# Patient Record
Sex: FEMALE | Race: WHITE | NOT HISPANIC OR LATINO | Employment: OTHER | URBAN - NONMETROPOLITAN AREA
[De-identification: names, ages, dates, MRNs, and addresses within clinical notes are randomized per-mention and may not be internally consistent; named-entity substitution may affect disease eponyms.]

---

## 2023-01-22 PROBLEM — M54.16 LUMBAR RADICULITIS: Status: ACTIVE | Noted: 2023-01-22

## 2023-01-22 PROBLEM — R73.9 BORDERLINE HYPERGLYCEMIA: Status: ACTIVE | Noted: 2023-01-22

## 2023-01-22 PROBLEM — E78.5 DYSLIPIDEMIA: Status: ACTIVE | Noted: 2023-01-22

## 2023-01-22 PROBLEM — M16.11 OSTEOARTHRITIS OF RIGHT HIP: Status: ACTIVE | Noted: 2023-01-22

## 2023-01-22 PROBLEM — M25.551 RIGHT HIP PAIN: Status: ACTIVE | Noted: 2023-01-22

## 2023-01-22 PROBLEM — E55.9 MILD VITAMIN D DEFICIENCY: Status: ACTIVE | Noted: 2023-01-22

## 2023-01-22 PROBLEM — S76.019A: Status: ACTIVE | Noted: 2023-01-22

## 2023-01-22 PROBLEM — G62.9 PERIPHERAL NEUROPATHY: Status: ACTIVE | Noted: 2023-01-22

## 2023-01-22 PROBLEM — R03.0 BLOOD PRESSURE ELEVATED WITHOUT HISTORY OF HTN: Status: ACTIVE | Noted: 2023-01-22

## 2023-01-22 PROBLEM — I73.9 PERIPHERAL ARTERIAL DISEASE (CMS-HCC): Status: ACTIVE | Noted: 2023-01-22

## 2023-01-22 PROBLEM — K57.30 DIVERTICULOSIS OF LARGE INTESTINE WITHOUT HEMORRHAGE: Status: ACTIVE | Noted: 2023-01-22

## 2023-01-22 PROBLEM — K63.5 HYPERPLASTIC COLON POLYP: Status: ACTIVE | Noted: 2023-01-22

## 2023-01-22 PROBLEM — M53.3 SACROILIAC JOINT DYSFUNCTION OF RIGHT SIDE: Status: ACTIVE | Noted: 2023-01-22

## 2023-01-22 PROBLEM — M79.2 INGUINAL NEURALGIA: Status: ACTIVE | Noted: 2023-01-22

## 2023-01-22 RX ORDER — OXYCODONE AND ACETAMINOPHEN 5; 325 MG/1; MG/1
1 TABLET ORAL DAILY PRN
COMMUNITY
Start: 2022-09-13 | End: 2023-03-07 | Stop reason: ALTCHOICE

## 2023-01-22 RX ORDER — KETOROLAC TROMETHAMINE 30 MG/ML
30 INJECTION, SOLUTION INTRAMUSCULAR; INTRAVENOUS
COMMUNITY
Start: 2022-03-25 | End: 2023-03-05 | Stop reason: ALTCHOICE

## 2023-01-22 RX ORDER — NAPROXEN 500 MG/1
500 TABLET ORAL EVERY 12 HOURS PRN
COMMUNITY
Start: 2022-05-04 | End: 2023-03-07 | Stop reason: ALTCHOICE

## 2023-01-22 RX ORDER — GABAPENTIN 300 MG/1
1 CAPSULE ORAL 3 TIMES DAILY
COMMUNITY
Start: 2022-03-25 | End: 2023-03-07 | Stop reason: ALTCHOICE

## 2023-01-22 RX ORDER — CYCLOBENZAPRINE HCL 5 MG
5 TABLET ORAL NIGHTLY
COMMUNITY
Start: 2022-08-30 | End: 2023-03-07 | Stop reason: ALTCHOICE

## 2023-01-22 RX ORDER — TIZANIDINE 2 MG/1
2 TABLET ORAL
COMMUNITY
End: 2023-03-05 | Stop reason: ALTCHOICE

## 2023-03-07 ENCOUNTER — OFFICE VISIT (OUTPATIENT)
Dept: PRIMARY CARE | Facility: CLINIC | Age: 68
End: 2023-03-07
Payer: MEDICARE

## 2023-03-07 VITALS
DIASTOLIC BLOOD PRESSURE: 64 MMHG | HEART RATE: 98 BPM | OXYGEN SATURATION: 96 % | WEIGHT: 144 LBS | BODY MASS INDEX: 27.19 KG/M2 | TEMPERATURE: 97.3 F | HEIGHT: 61 IN | SYSTOLIC BLOOD PRESSURE: 122 MMHG

## 2023-03-07 DIAGNOSIS — E78.5 DYSLIPIDEMIA: ICD-10-CM

## 2023-03-07 DIAGNOSIS — Z78.0 ASYMPTOMATIC POSTMENOPAUSAL STATE: ICD-10-CM

## 2023-03-07 DIAGNOSIS — Z12.31 ENCOUNTER FOR SCREENING MAMMOGRAM FOR BREAST CANCER: ICD-10-CM

## 2023-03-07 DIAGNOSIS — Z00.00 MEDICARE ANNUAL WELLNESS VISIT, SUBSEQUENT: Primary | ICD-10-CM

## 2023-03-07 PROCEDURE — G0439 PPPS, SUBSEQ VISIT: HCPCS | Performed by: FAMILY MEDICINE

## 2023-03-07 PROCEDURE — 1036F TOBACCO NON-USER: CPT | Performed by: FAMILY MEDICINE

## 2023-03-07 PROCEDURE — 1170F FXNL STATUS ASSESSED: CPT | Performed by: FAMILY MEDICINE

## 2023-03-07 PROCEDURE — 1159F MED LIST DOCD IN RCRD: CPT | Performed by: FAMILY MEDICINE

## 2023-03-07 ASSESSMENT — ACTIVITIES OF DAILY LIVING (ADL)
PILL BOX USED: NO
JUDGMENT_ADEQUATE_SAFELY_COMPLETE_DAILY_ACTIVITIES: NO
JUDGMENT_ADEQUATE_SAFELY_COMPLETE_DAILY_ACTIVITIES: YES
PREPARING MEALS: INDEPENDENT
USING TELEPHONE: INDEPENDENT
GROCERY SHOPPING: INDEPENDENT
TAKING MEDICATION: INDEPENDENT
PATIENT'S MEMORY ADEQUATE TO SAFELY COMPLETE DAILY ACTIVITIES?: YES
TAKING_MEDICATION: INDEPENDENT
GROCERY_SHOPPING: INDEPENDENT
FEEDING YOURSELF: INDEPENDENT
FEEDING: INDEPENDENT
BATHING: INDEPENDENT
EATING: INDEPENDENT
BATHING: INDEPENDENT
HEARING - LEFT EAR: FUNCTIONAL
BATHING: INDEPENDENT
ADEQUATE_TO_COMPLETE_ADL: NO
TOILETING: INDEPENDENT
WALKS IN HOME: INDEPENDENT
DRESSING: INDEPENDENT
MANAGING_FINANCES: INDEPENDENT
NEEDS ASSISTANCE WITH FOOD: INDEPENDENT
MANAGING FINANCES: INDEPENDENT
TOILETING: INDEPENDENT
GROOMING: INDEPENDENT
DOING_HOUSEWORK: INDEPENDENT
DRESSING YOURSELF: INDEPENDENT
HEARING - RIGHT EAR: FUNCTIONAL
USING TRANSPORTATION: INDEPENDENT
STIL DRIVING: YES
DOING HOUSEWORK: INDEPENDENT
DRESSING: INDEPENDENT
ADEQUATE_TO_COMPLETE_ADL: YES

## 2023-03-07 ASSESSMENT — PATIENT HEALTH QUESTIONNAIRE - PHQ9
2. FEELING DOWN, DEPRESSED OR HOPELESS: NOT AT ALL
2. FEELING DOWN, DEPRESSED OR HOPELESS: NOT AT ALL
1. LITTLE INTEREST OR PLEASURE IN DOING THINGS: NOT AT ALL
SUM OF ALL RESPONSES TO PHQ9 QUESTIONS 1 AND 2: 0
SUM OF ALL RESPONSES TO PHQ9 QUESTIONS 1 AND 2: 0
1. LITTLE INTEREST OR PLEASURE IN DOING THINGS: NOT AT ALL

## 2023-03-07 NOTE — PROGRESS NOTES
"Subjective   Reason for Visit: Janelle Pitt is an 67 y.o. female here for a Medicare Wellness visit.          Reviewed all medications by prescribing practitioner or clinical pharmacist (such as prescriptions, OTCs, herbal therapies and supplements) and documented in the medical record.    HPI    Here for medicare annual visit  Doing much better since she had her right hip replacement, no longer having pain in her hip or her back, no numbness/tingling.     Patient Self Assessment of Health Status  Patient Self Assessment: Excellent    Nutrition and Exercise  Current Diet: Well Balanced Diet  Adequate Fluid Intake: Yes  Caffeine: Yes  Exercise Frequency: Infrequently    Functional Ability/Level of Safety  Cognitive Impairment Observed: No cognitive impairment observed  Cognitive Impairment Reported: No cognitive impairment reported by patient or family    Home Safety Risk Factors: None    Patient Care Team:  Kar Medina MD as PCP - General Lainey Moss MD as PCP - [employer] PCP     Review of Systems  General: no fever  Eyes: no blurry vision  ENT: no sore throat, no ear pain  Resp: no cough, sob or wheezing  Cardio: no chest pain, no palpitations  Abd: no nausea/vomiting  : no dysuria, no increased urinary frequency      Objective   Vitals:  /64   Pulse 98   Temp 36.3 °C (97.3 °F)   Ht 1.549 m (5' 1\")   Wt 65.3 kg (144 lb)   SpO2 96%   BMI 27.21 kg/m²       Physical Exam  Gen: NAD, alert  Head: normocephalic/atraumatic  Eyes: conjunctivae normal  Ears: canals clear bilaterally, TM normal   Nose: Deferred due to covid precautions, wearing mask  Oropharynx: Deferred due to covid precautions, wearing mask  Resp: Clear to auscultation  CVS: Regular rate and rhythm  Abdomen: soft, NT, ND  Ext: no edema, NT of lower extremities  Neuro: gait normal       Assessment/Plan   Problem List Items Addressed This Visit       Dyslipidemia    Relevant Orders    CBC    Comprehensive " Metabolic Panel    Lipid Panel     Other Visit Diagnoses       Medicare annual wellness visit, subsequent    -  Primary    Encounter for screening mammogram for breast cancer        Relevant Orders    BI mammo bilateral screening tomosynthesis    Asymptomatic postmenopausal state        Relevant Orders    XR DEXA bone density

## 2023-03-12 PROBLEM — K57.30 DIVERTICULOSIS OF LARGE INTESTINE WITHOUT HEMORRHAGE: Status: RESOLVED | Noted: 2023-01-22 | Resolved: 2023-03-12

## 2023-03-12 PROBLEM — I73.9 PERIPHERAL ARTERIAL DISEASE (CMS-HCC): Status: RESOLVED | Noted: 2023-01-22 | Resolved: 2023-03-12

## 2023-03-12 PROBLEM — M79.2 INGUINAL NEURALGIA: Status: RESOLVED | Noted: 2023-01-22 | Resolved: 2023-03-12

## 2023-03-12 PROBLEM — M53.3 SACROILIAC JOINT DYSFUNCTION OF RIGHT SIDE: Status: RESOLVED | Noted: 2023-01-22 | Resolved: 2023-03-12

## 2023-03-12 PROBLEM — M54.16 LUMBAR RADICULITIS: Status: RESOLVED | Noted: 2023-01-22 | Resolved: 2023-03-12

## 2023-03-12 PROBLEM — K63.5 HYPERPLASTIC COLON POLYP: Status: RESOLVED | Noted: 2023-01-22 | Resolved: 2023-03-12

## 2023-03-12 PROBLEM — S76.019A: Status: RESOLVED | Noted: 2023-01-22 | Resolved: 2023-03-12

## 2023-03-12 PROBLEM — M25.551 RIGHT HIP PAIN: Status: RESOLVED | Noted: 2023-01-22 | Resolved: 2023-03-12

## 2023-03-12 PROBLEM — R03.0 BLOOD PRESSURE ELEVATED WITHOUT HISTORY OF HTN: Status: RESOLVED | Noted: 2023-01-22 | Resolved: 2023-03-12

## 2023-03-12 PROBLEM — M16.11 OSTEOARTHRITIS OF RIGHT HIP: Status: RESOLVED | Noted: 2023-01-22 | Resolved: 2023-03-12

## 2023-03-12 PROBLEM — G62.9 PERIPHERAL NEUROPATHY: Status: RESOLVED | Noted: 2023-01-22 | Resolved: 2023-03-12

## 2023-07-10 ENCOUNTER — LAB (OUTPATIENT)
Dept: LAB | Facility: LAB | Age: 68
End: 2023-07-10
Payer: MEDICARE

## 2023-07-10 ENCOUNTER — TELEPHONE (OUTPATIENT)
Dept: PRIMARY CARE | Facility: CLINIC | Age: 68
End: 2023-07-10

## 2023-07-10 DIAGNOSIS — E78.5 DYSLIPIDEMIA: ICD-10-CM

## 2023-07-10 DIAGNOSIS — E78.5 DYSLIPIDEMIA: Primary | ICD-10-CM

## 2023-07-10 LAB
ALANINE AMINOTRANSFERASE (SGPT) (U/L) IN SER/PLAS: 13 U/L (ref 7–45)
ALBUMIN (G/DL) IN SER/PLAS: 4.1 G/DL (ref 3.4–5)
ALKALINE PHOSPHATASE (U/L) IN SER/PLAS: 84 U/L (ref 33–136)
ANION GAP IN SER/PLAS: 13 MMOL/L (ref 10–20)
ASPARTATE AMINOTRANSFERASE (SGOT) (U/L) IN SER/PLAS: 23 U/L (ref 9–39)
BILIRUBIN TOTAL (MG/DL) IN SER/PLAS: 0.7 MG/DL (ref 0–1.2)
CALCIUM (MG/DL) IN SER/PLAS: 9.3 MG/DL (ref 8.6–10.3)
CARBON DIOXIDE, TOTAL (MMOL/L) IN SER/PLAS: 30 MMOL/L (ref 21–32)
CHLORIDE (MMOL/L) IN SER/PLAS: 99 MMOL/L (ref 98–107)
CHOLESTEROL (MG/DL) IN SER/PLAS: 215 MG/DL (ref 0–199)
CHOLESTEROL IN HDL (MG/DL) IN SER/PLAS: 92.1 MG/DL
CHOLESTEROL/HDL RATIO: 2.3
CREATININE (MG/DL) IN SER/PLAS: 0.57 MG/DL (ref 0.5–1.05)
ERYTHROCYTE DISTRIBUTION WIDTH (RATIO) BY AUTOMATED COUNT: 13.2 % (ref 11.5–14.5)
ERYTHROCYTE MEAN CORPUSCULAR HEMOGLOBIN CONCENTRATION (G/DL) BY AUTOMATED: 33.4 G/DL (ref 32–36)
ERYTHROCYTE MEAN CORPUSCULAR VOLUME (FL) BY AUTOMATED COUNT: 96 FL (ref 80–100)
ERYTHROCYTES (10*6/UL) IN BLOOD BY AUTOMATED COUNT: 4.48 X10E12/L (ref 4–5.2)
GFR FEMALE: >90 ML/MIN/1.73M2
GLUCOSE (MG/DL) IN SER/PLAS: 95 MG/DL (ref 74–99)
HEMATOCRIT (%) IN BLOOD BY AUTOMATED COUNT: 42.8 % (ref 36–46)
HEMOGLOBIN (G/DL) IN BLOOD: 14.3 G/DL (ref 12–16)
LDL: 102 MG/DL (ref 0–99)
LEUKOCYTES (10*3/UL) IN BLOOD BY AUTOMATED COUNT: 7.1 X10E9/L (ref 4.4–11.3)
PLATELETS (10*3/UL) IN BLOOD AUTOMATED COUNT: 314 X10E9/L (ref 150–450)
POTASSIUM (MMOL/L) IN SER/PLAS: 3.5 MMOL/L (ref 3.5–5.3)
PROTEIN TOTAL: 7 G/DL (ref 6.4–8.2)
SODIUM (MMOL/L) IN SER/PLAS: 138 MMOL/L (ref 136–145)
TRIGLYCERIDE (MG/DL) IN SER/PLAS: 107 MG/DL (ref 0–149)
UREA NITROGEN (MG/DL) IN SER/PLAS: 8 MG/DL (ref 6–23)
VLDL: 21 MG/DL (ref 0–40)

## 2023-07-10 PROCEDURE — 80053 COMPREHEN METABOLIC PANEL: CPT

## 2023-07-10 PROCEDURE — 80061 LIPID PANEL: CPT

## 2023-07-10 PROCEDURE — 85027 COMPLETE CBC AUTOMATED: CPT

## 2023-07-10 PROCEDURE — 36415 COLL VENOUS BLD VENIPUNCTURE: CPT

## 2023-07-10 NOTE — TELEPHONE ENCOUNTER
Are you able to reinput bloodwork in system, CBC, CMP, Lipid?  Lynne at the lab is unable to pull current orders since they were entered prior to [facility].  Bloodwork has been drawn already.

## 2023-09-05 DIAGNOSIS — M79.2 NEURALGIA AND NEURITIS, UNSPECIFIED: ICD-10-CM

## 2023-09-05 RX ORDER — CYCLOBENZAPRINE HCL 5 MG
TABLET ORAL
Qty: 90 TABLET | Refills: 1 | Status: SHIPPED | OUTPATIENT
Start: 2023-09-05 | End: 2024-03-05 | Stop reason: SDUPTHER

## 2024-03-05 ENCOUNTER — OFFICE VISIT (OUTPATIENT)
Dept: PRIMARY CARE | Facility: CLINIC | Age: 69
End: 2024-03-05
Payer: MEDICARE

## 2024-03-05 VITALS
TEMPERATURE: 97 F | DIASTOLIC BLOOD PRESSURE: 80 MMHG | SYSTOLIC BLOOD PRESSURE: 149 MMHG | WEIGHT: 151.6 LBS | BODY MASS INDEX: 28.62 KG/M2 | HEIGHT: 61 IN

## 2024-03-05 DIAGNOSIS — M25.552 LEFT HIP PAIN: ICD-10-CM

## 2024-03-05 DIAGNOSIS — R03.0 ELEVATED BLOOD PRESSURE READING: ICD-10-CM

## 2024-03-05 DIAGNOSIS — Z00.00 MEDICARE ANNUAL WELLNESS VISIT, SUBSEQUENT: Primary | ICD-10-CM

## 2024-03-05 DIAGNOSIS — M54.32 SCIATICA OF LEFT SIDE: ICD-10-CM

## 2024-03-05 PROCEDURE — 1123F ACP DISCUSS/DSCN MKR DOCD: CPT | Performed by: FAMILY MEDICINE

## 2024-03-05 PROCEDURE — 1157F ADVNC CARE PLAN IN RCRD: CPT | Performed by: FAMILY MEDICINE

## 2024-03-05 PROCEDURE — 1036F TOBACCO NON-USER: CPT | Performed by: FAMILY MEDICINE

## 2024-03-05 PROCEDURE — 1158F ADVNC CARE PLAN TLK DOCD: CPT | Performed by: FAMILY MEDICINE

## 2024-03-05 PROCEDURE — 1159F MED LIST DOCD IN RCRD: CPT | Performed by: FAMILY MEDICINE

## 2024-03-05 PROCEDURE — 1170F FXNL STATUS ASSESSED: CPT | Performed by: FAMILY MEDICINE

## 2024-03-05 PROCEDURE — 3008F BODY MASS INDEX DOCD: CPT | Performed by: FAMILY MEDICINE

## 2024-03-05 PROCEDURE — 96372 THER/PROPH/DIAG INJ SC/IM: CPT | Performed by: FAMILY MEDICINE

## 2024-03-05 PROCEDURE — G0439 PPPS, SUBSEQ VISIT: HCPCS | Performed by: FAMILY MEDICINE

## 2024-03-05 PROCEDURE — 1160F RVW MEDS BY RX/DR IN RCRD: CPT | Performed by: FAMILY MEDICINE

## 2024-03-05 RX ORDER — CYCLOBENZAPRINE HCL 5 MG
TABLET ORAL
Qty: 90 TABLET | Refills: 1 | Status: SHIPPED | OUTPATIENT
Start: 2024-03-05

## 2024-03-05 RX ORDER — KETOROLAC TROMETHAMINE 30 MG/ML
30 INJECTION, SOLUTION INTRAMUSCULAR; INTRAVENOUS ONCE
Status: COMPLETED | OUTPATIENT
Start: 2024-03-05 | End: 2024-03-05

## 2024-03-05 RX ORDER — PREDNISONE 20 MG/1
TABLET ORAL
Qty: 18 TABLET | Refills: 0 | Status: SHIPPED | OUTPATIENT
Start: 2024-03-05 | End: 2024-06-11 | Stop reason: ALTCHOICE

## 2024-03-05 RX ADMIN — KETOROLAC TROMETHAMINE 30 MG: 30 INJECTION, SOLUTION INTRAMUSCULAR; INTRAVENOUS at 13:51

## 2024-03-05 ASSESSMENT — ENCOUNTER SYMPTOMS
LOSS OF SENSATION IN FEET: 0
OCCASIONAL FEELINGS OF UNSTEADINESS: 0
DEPRESSION: 0

## 2024-03-05 ASSESSMENT — PATIENT HEALTH QUESTIONNAIRE - PHQ9
SUM OF ALL RESPONSES TO PHQ9 QUESTIONS 1 AND 2: 0
2. FEELING DOWN, DEPRESSED OR HOPELESS: NOT AT ALL
1. LITTLE INTEREST OR PLEASURE IN DOING THINGS: NOT AT ALL

## 2024-03-05 ASSESSMENT — ACTIVITIES OF DAILY LIVING (ADL)
TAKING_MEDICATION: INDEPENDENT
BATHING: INDEPENDENT
DRESSING: INDEPENDENT
GROCERY_SHOPPING: INDEPENDENT
DOING_HOUSEWORK: INDEPENDENT
MANAGING_FINANCES: INDEPENDENT
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
## 2024-03-07 ENCOUNTER — APPOINTMENT (OUTPATIENT)
Dept: PRIMARY CARE | Facility: CLINIC | Age: 69
End: 2024-03-07
Payer: MEDICARE

## 2024-03-20 ENCOUNTER — HOSPITAL ENCOUNTER (OUTPATIENT)
Dept: RADIOLOGY | Facility: HOSPITAL | Age: 69
Discharge: HOME | End: 2024-03-20
Payer: MEDICARE

## 2024-03-20 ENCOUNTER — OFFICE VISIT (OUTPATIENT)
Dept: PRIMARY CARE | Facility: CLINIC | Age: 69
End: 2024-03-20
Payer: MEDICARE

## 2024-03-20 ENCOUNTER — APPOINTMENT (OUTPATIENT)
Dept: PRIMARY CARE | Facility: CLINIC | Age: 69
End: 2024-03-20
Payer: MEDICARE

## 2024-03-20 VITALS
DIASTOLIC BLOOD PRESSURE: 84 MMHG | BODY MASS INDEX: 28.74 KG/M2 | WEIGHT: 152.2 LBS | SYSTOLIC BLOOD PRESSURE: 134 MMHG | HEIGHT: 61 IN | OXYGEN SATURATION: 98 % | HEART RATE: 103 BPM

## 2024-03-20 DIAGNOSIS — M54.32 SCIATICA OF LEFT SIDE: ICD-10-CM

## 2024-03-20 DIAGNOSIS — M25.552 LEFT HIP PAIN: ICD-10-CM

## 2024-03-20 PROCEDURE — 1160F RVW MEDS BY RX/DR IN RCRD: CPT | Performed by: FAMILY MEDICINE

## 2024-03-20 PROCEDURE — 73502 X-RAY EXAM HIP UNI 2-3 VIEWS: CPT | Mod: LEFT SIDE | Performed by: RADIOLOGY

## 2024-03-20 PROCEDURE — 1036F TOBACCO NON-USER: CPT | Performed by: FAMILY MEDICINE

## 2024-03-20 PROCEDURE — 1159F MED LIST DOCD IN RCRD: CPT | Performed by: FAMILY MEDICINE

## 2024-03-20 PROCEDURE — 1157F ADVNC CARE PLAN IN RCRD: CPT | Performed by: FAMILY MEDICINE

## 2024-03-20 PROCEDURE — 73502 X-RAY EXAM HIP UNI 2-3 VIEWS: CPT | Mod: LT

## 2024-03-20 PROCEDURE — 3008F BODY MASS INDEX DOCD: CPT | Performed by: FAMILY MEDICINE

## 2024-03-20 PROCEDURE — 99213 OFFICE O/P EST LOW 20 MIN: CPT | Performed by: FAMILY MEDICINE

## 2024-03-20 RX ORDER — TRAMADOL HYDROCHLORIDE 50 MG/1
50 TABLET ORAL EVERY 12 HOURS PRN
Qty: 14 TABLET | Refills: 0 | Status: SHIPPED | OUTPATIENT
Start: 2024-03-20 | End: 2024-03-27

## 2024-03-20 RX ORDER — GABAPENTIN 100 MG/1
100 CAPSULE ORAL 3 TIMES DAILY
Qty: 90 CAPSULE | Refills: 5 | Status: SHIPPED | OUTPATIENT
Start: 2024-03-20 | End: 2024-09-16

## 2024-03-20 RX ORDER — CYCLOBENZAPRINE HCL 5 MG
TABLET ORAL
Qty: 90 TABLET | Refills: 1 | Status: CANCELLED | OUTPATIENT
Start: 2024-03-20

## 2024-03-20 ASSESSMENT — PATIENT HEALTH QUESTIONNAIRE - PHQ9
2. FEELING DOWN, DEPRESSED OR HOPELESS: NOT AT ALL
1. LITTLE INTEREST OR PLEASURE IN DOING THINGS: NOT AT ALL
SUM OF ALL RESPONSES TO PHQ9 QUESTIONS 1 AND 2: 0

## 2024-03-20 ASSESSMENT — ENCOUNTER SYMPTOMS
DEPRESSION: 0
LOSS OF SENSATION IN FEET: 0
OCCASIONAL FEELINGS OF UNSTEADINESS: 0

## 2024-03-20 NOTE — PROGRESS NOTES
"Subjective   Patient ID: Janelle Pitt is a 68 y.o. female who presents for Follow-up (Left leg, losing balance fell twice last week).    HPI  Still having pain in her left hip radiating down her leg, now having numbness in her lower leg and feels balance. Finished prednisone did not help.     Review of Systems  General: no fever  Eyes: no blurry vision  ENT: no sore throat, no ear pain  Resp: no cough, sob or wheezing  Cardio: no chest pain, no palpitations  Abd: no nausea/vomiting  : no dysuria, no increased urinary frequency      /84   Pulse 103   Ht 1.549 m (5' 1\")   Wt 69 kg (152 lb 3.2 oz)   SpO2 98%   BMI 28.76 kg/m²       Objective   Physical Exam  Gen: NAD, alert  Head: normocephalic/atraumatic  Eyes: conjunctivae normal  Ears: canals clear bilaterally, TM normal   Nose: external nose normal   Oropharynx: clear   Resp: Clear to auscultation  CVS: Regular rate and rhythm  Abdomen: soft, NT, ND  Ext: no edema, NT of lower extremities  Neuro: using cane to ambulate, limping gait    Assessment/Plan   Problem List Items Addressed This Visit    None  Visit Diagnoses       Left hip pain        Relevant Medications    traMADol (Ultram) 50 mg tablet    Other Relevant Orders    XR hip left with pelvis when performed 2 or 3 views    Referral to Physical Therapy    Sciatica of left side        Relevant Medications    gabapentin (Neurontin) 100 mg capsule    Other Relevant Orders    Referral to Physical Therapy               "

## 2024-04-15 ENCOUNTER — EVALUATION (OUTPATIENT)
Dept: PHYSICAL THERAPY | Facility: HOSPITAL | Age: 69
End: 2024-04-15
Payer: MEDICARE

## 2024-04-15 DIAGNOSIS — M25.552 LEFT HIP PAIN: ICD-10-CM

## 2024-04-15 DIAGNOSIS — M54.32 SCIATICA OF LEFT SIDE: Primary | ICD-10-CM

## 2024-04-15 PROCEDURE — 97162 PT EVAL MOD COMPLEX 30 MIN: CPT | Mod: GP | Performed by: PHYSICAL THERAPIST

## 2024-04-15 ASSESSMENT — PAIN - FUNCTIONAL ASSESSMENT: PAIN_FUNCTIONAL_ASSESSMENT: 0-10

## 2024-04-15 ASSESSMENT — ENCOUNTER SYMPTOMS
LOSS OF SENSATION IN FEET: 1
DEPRESSION: 0
OCCASIONAL FEELINGS OF UNSTEADINESS: 1

## 2024-04-15 ASSESSMENT — PAIN SCALES - GENERAL: PAINLEVEL_OUTOF10: 7
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
## 2024-04-18 ENCOUNTER — OFFICE VISIT (OUTPATIENT)
Dept: PRIMARY CARE | Facility: CLINIC | Age: 69
End: 2024-04-18
Payer: MEDICARE

## 2024-04-18 ENCOUNTER — TREATMENT (OUTPATIENT)
Dept: PHYSICAL THERAPY | Facility: HOSPITAL | Age: 69
End: 2024-04-18
Payer: MEDICARE

## 2024-04-18 VITALS
WEIGHT: 149.2 LBS | TEMPERATURE: 97 F | HEART RATE: 98 BPM | SYSTOLIC BLOOD PRESSURE: 127 MMHG | DIASTOLIC BLOOD PRESSURE: 83 MMHG | HEIGHT: 61 IN | OXYGEN SATURATION: 99 % | BODY MASS INDEX: 28.17 KG/M2

## 2024-04-18 DIAGNOSIS — M21.372 LEFT FOOT DROP: ICD-10-CM

## 2024-04-18 DIAGNOSIS — M25.552 LEFT HIP PAIN: ICD-10-CM

## 2024-04-18 DIAGNOSIS — M54.42 LEFT-SIDED LOW BACK PAIN WITH LEFT-SIDED SCIATICA, UNSPECIFIED CHRONICITY: Primary | ICD-10-CM

## 2024-04-18 DIAGNOSIS — M54.32 SCIATICA OF LEFT SIDE: ICD-10-CM

## 2024-04-18 PROCEDURE — 1159F MED LIST DOCD IN RCRD: CPT | Performed by: FAMILY MEDICINE

## 2024-04-18 PROCEDURE — 1157F ADVNC CARE PLAN IN RCRD: CPT | Performed by: FAMILY MEDICINE

## 2024-04-18 PROCEDURE — 1160F RVW MEDS BY RX/DR IN RCRD: CPT | Performed by: FAMILY MEDICINE

## 2024-04-18 PROCEDURE — 1036F TOBACCO NON-USER: CPT | Performed by: FAMILY MEDICINE

## 2024-04-18 PROCEDURE — 99213 OFFICE O/P EST LOW 20 MIN: CPT | Performed by: FAMILY MEDICINE

## 2024-04-18 PROCEDURE — 3008F BODY MASS INDEX DOCD: CPT | Performed by: FAMILY MEDICINE

## 2024-04-18 PROCEDURE — 97110 THERAPEUTIC EXERCISES: CPT | Mod: GP,CQ

## 2024-04-18 RX ORDER — OXYCODONE AND ACETAMINOPHEN 5; 325 MG/1; MG/1
1 TABLET ORAL EVERY 12 HOURS PRN
Qty: 14 TABLET | Refills: 0 | Status: SHIPPED | OUTPATIENT
Start: 2024-04-18 | End: 2024-04-25

## 2024-04-18 ASSESSMENT — PAIN - FUNCTIONAL ASSESSMENT: PAIN_FUNCTIONAL_ASSESSMENT: 0-10

## 2024-04-18 ASSESSMENT — PAIN SCALES - GENERAL: PAINLEVEL_OUTOF10: 7

## 2024-04-18 NOTE — PROGRESS NOTES
Physical Therapy Treatment    Patient Name: Janelle Pitt  MRN: [id number]  Today's Date: 4/18/2024  Time Calculation  Start Time: 1300  Stop Time: 1340  Time Calculation (min): 40 min     Current Problem  1. Left hip pain  Follow Up In Physical Therapy      2. Sciatica of left side  Follow Up In Physical Therapy          General  Reason for Referral: L LE radicular symptoms  Referred By: Dr. Gurrola  Past Medical History Relevant to Rehab: h/o R JIE 2022  Preferred Learning Style: verbal, visual, written    Subjective   Current Condition:   Same  Patient reports she was compliant with HEP, and had experienced some minimal relief; However, her pain level is the same.    Performing HEP?: Yes    Precautions  Precautions  STEADI Fall Risk Score (The score of 4 or more indicates an increased risk of falling): 8  Medical Precautions: Fall precautions    Pain  Pain Assessment: 0-10  Pain Score: 7  Pain Type: Neuropathic pain  Pain Location: Back  Pain Orientation: Left  Pain Radiating Towards: L foot  Pain Descriptors: Numbness, Radiating, Tightness, Aching  Pain Onset: Awakened from sleep    Objective   Lumbar Spine  Observation   Pt. Continues shifted to the L, and reports pain and tightness in L post LE and top of foot.  Lumbar Palpation/Joint Mobility Assessment   Pain with palpation L post HS, gastroc radiating from LB  Hip AROM   L shift    Treatments:    Therapeutic Exercise  Therapeutic Exercise Performed: Yes  Therapeutic Exercise Activity 1: Lateral shift correction at wall x 10  Therapeutic Exercise Activity 2: Prone lay hips L x 10  Therapeutic Exercise Activity 3: Prone on elbows hips L x 10  Therapeutic Exercise Activity 4: Side L on R x 5 min.  Therapeutic Exercise Activity 5: gastroc stretch on wedge 30 sec. x 2 ea.  Therapeutic Exercise Activity 6: HS stretch on steps  30 sec. x 2 on ea.    EDUCATION:   Outpatient Education  Individual(s) Educated: Patient  Education Provided: Home Exercise  Program  Patient/Caregiver Demonstrated Understanding: yes  Patient Response to Education: Patient/Caregiver Verbalized Understanding of Information    Assessment:Pt. Able to tolerate all exercises and added side lying on R to correct weight shift, and manual shift correct. Pt. Also tolerated stretching of tight Les. We will continue to work on shift correction and strengthening, as well as stretching and gait activities.  PT Assessment  PT Assessment Results: Decreased strength, Decreased range of motion, Decreased endurance, Impaired balance, Decreased mobility  Rehab Prognosis: Good    Plan:  OP PT Plan  Treatment/Interventions: Cryotherapy, Education/ Instruction, Gait training, Manual therapy, Neuromuscular re-education, Therapeutic activities, Therapeutic exercises  PT Plan: Skilled PT  PT Frequency: 2 times per week  Duration: 5 weeks  Onset Date: 02/01/24  Certification Period Start Date: 04/15/24  Certification Period End Date: 05/27/24  Number of Treatments Authorized: 2  Rehab Potential: Good  Plan of Care Agreement: Patient    Goals:  Active       PT Problem       Patient will be independent with HEP.        Start:  04/15/24    Expected End:  04/29/24            Patient will demonstrate upright posture without a L lateral shift in order to improve her pain.       Start:  04/15/24    Expected End:  05/13/24            Patient will demonstrate >/=4/5 strength with L ankle DF to improve her drop foot.       Start:  04/15/24    Expected End:  05/20/24            Patient will score >/= 45/80 on LEFS to improve her ability to perform ADL's and FA's.       Start:  04/15/24    Expected End:  05/20/24            Patient will report no >/= 2/10 pain with ambulating up to 15 minutes in the community with LRAD.       Start:  04/15/24    Expected End:  05/20/24                 Argelia Borrego, PTA

## 2024-04-18 NOTE — PROGRESS NOTES
"Subjective   Patient ID: Janelle Pitt is a 68 y.o. female who presents for Follow-up (Needs better pain medication).  HPI  Here for follow up   Still having left hip pain, now also having lower back pain with numbness from her left ankle to foot with foot drop. Following with PT.   Xray hip showed OA.   On flexeril and gabapentin for pain control, gabapentin making her drowsy. Tramadol did not help    Review of Systems  General: no fever  Eyes: no blurry vision  ENT: no sore throat, no ear pain  Resp: no cough, sob or wheezing  Cardio: no chest pain, no palpitations  Abd: no nausea/vomiting  : no dysuria, no increased urinary frequency      /83   Pulse 98   Temp 36.1 °C (97 °F)   Ht 1.549 m (5' 1\")   Wt 67.7 kg (149 lb 3.2 oz)   SpO2 99%   BMI 28.19 kg/m²       Objective   Physical Exam  Gen: NAD, alert  Head: normocephalic/atraumatic  Eyes: conjunctivae normal  Ears: canals clear bilaterally, TM normal   Nose: external nose normal   Oropharynx: clear   Resp: Clear to auscultation  CVS: Regular rate and rhythm  Abdomen: soft, NT, ND  Ext: no edema, NT of lower extremities  Neuro: limping gait, foot drop present     Assessment/Plan   Problem List Items Addressed This Visit    None  Visit Diagnoses       Left-sided low back pain with left-sided sciatica, unspecified chronicity    -  Primary    Relevant Medications    oxyCODONE-acetaminophen (Percocet) 5-325 mg tablet    Other Relevant Orders    XR lumbar spine 2-3 views    Left foot drop                   "

## 2024-04-23 ENCOUNTER — TREATMENT (OUTPATIENT)
Dept: PHYSICAL THERAPY | Facility: HOSPITAL | Age: 69
End: 2024-04-23
Payer: MEDICARE

## 2024-04-23 DIAGNOSIS — M25.552 LEFT HIP PAIN: ICD-10-CM

## 2024-04-23 DIAGNOSIS — M54.32 SCIATICA OF LEFT SIDE: ICD-10-CM

## 2024-04-23 PROCEDURE — 97110 THERAPEUTIC EXERCISES: CPT | Mod: GP | Performed by: PHYSICAL THERAPIST

## 2024-04-23 PROCEDURE — 97140 MANUAL THERAPY 1/> REGIONS: CPT | Mod: GP | Performed by: PHYSICAL THERAPIST

## 2024-04-23 ASSESSMENT — PAIN - FUNCTIONAL ASSESSMENT: PAIN_FUNCTIONAL_ASSESSMENT: 0-10

## 2024-04-23 ASSESSMENT — PAIN SCALES - GENERAL: PAINLEVEL_OUTOF10: 7

## 2024-04-23 NOTE — PROGRESS NOTES
"  Physical Therapy Treatment    Patient Name: Janelle Pitt  MRN: [id number]  Today's Date: 4/23/2024  Time Calculation  Start Time: 1347  Stop Time: 1428  Time Calculation (min): 41 min     PT Therapeutic Procedures Time Entry  Manual Therapy Time Entry: 12  Therapeutic Exercise Time Entry: 29      Current Problem  Problem List Items Addressed This Visit             ICD-10-CM    Left hip pain M25.552    Sciatica of left side M54.32     1. Left hip pain  Follow Up In Physical Therapy      2. Sciatica of left side  Follow Up In Physical Therapy          General  Reason for Referral: L LE radicular symptoms  Referred By: Dr. Gurrola  Past Medical History Relevant to Rehab: h/o R JIE 2022  Preferred Learning Style: verbal, visual, written    Subjective   Current Condition:   Same  Patient reports no significant change in symptoms yet. Her symptoms are not getting worse with her shift correction exercises but no significant change in pain and L foot numbness yet.    Performing HEP?: Yes    Precautions  Precautions  STEADI Fall Risk Score (The score of 4 or more indicates an increased risk of falling): 8  Medical Precautions: Fall precautions  Pain  Pain Assessment: 0-10  Pain Score: 7  Pain Type: Neuropathic pain  Pain Location: Back  Pain Orientation: Left  Pain Radiating Towards: L foot  Pain Descriptors: Numbness, Radiating, Tightness, Aching  Pain Onset: Ongoing    Objective   Patient ambulating with std cane, (+) L drop foot    Treatments:    Therapeutic Exercise  Therapeutic Exercise Performed: Yes  Therapeutic Exercise Activity 1: Lateral shift correction at wall x 10  Therapeutic Exercise Activity 2: Prone lay hips L x 10  Therapeutic Exercise Activity 3: Prone on elbows hips L x 60\"  Therapeutic Exercise Activity 4: Prone press up with shift correction x 10  Therapeutic Exercise Activity 5: Prone press up with shift correction x 10 with OP  Therapeutic Exercise Activity 6: Standing heel/toe raises x 20      Manual " Therapy  Manual Therapy Performed: Yes  Manual Therapy Activity 1: TPR/DTM L lumbar paraspinals  Manual Therapy Activity 2: Grade III L/S PA henry    EDUCATION:   Individual(s) Educated: Patient  Education Provided:   Handout(s) Provided: Scanned into chart  Home Program: Prone press up with shift correction x 10, 5x per day  Risk and Benefits Discussed with Patient/Caregiver/Other: Yes   Patient/Caregiver Demonstrated Understanding: Yes   Patient Response to Education: Patient/Caregiver verbalized understanding of information, Patient/Caregiver performed return demonstration of exercises/activities, and Patient/Caregiver asked appropriate questions    Assessment:   The patient continues to ambulate with a L sided shift. She is able to correct it but no change in L foot numbness, even with press ups with OP. Patient to trial adding in shift correct with press up to home program. PT to continue to assess response and if no change in symptoms with continued L drop foot, patient to be referred on further evaluation. She has no change in bowel/bladder symptoms.  PT Assessment  PT Assessment Results: Decreased strength, Decreased range of motion, Decreased endurance, Impaired balance, Decreased mobility  Rehab Prognosis: Good    Plan: Continue with POC.     OP PT Plan  Treatment/Interventions: Cryotherapy, Education/ Instruction, Gait training, Manual therapy, Neuromuscular re-education, Therapeutic activities, Therapeutic exercises  PT Plan: Skilled PT  PT Frequency: 2 times per week  Duration: 5 weeks  Onset Date: 02/01/24  Certification Period Start Date: 04/15/24  Certification Period End Date: 06/14/24  Number of Treatments Authorized: 3 of 6  Rehab Potential: Good  Plan of Care Agreement: Patient  Payor: [employer]ICARE / Plan: [employer] MEDICARE ADVANTAGE / Product Type: *No Product type* /     Goals:  Active       PT Problem       Patient will be independent with HEP.        Start:  04/15/24    Expected End:   04/29/24            Patient will demonstrate upright posture without a L lateral shift in order to improve her pain.       Start:  04/15/24    Expected End:  05/13/24            Patient will demonstrate >/=4/5 strength with L ankle DF to improve her drop foot.       Start:  04/15/24    Expected End:  05/20/24            Patient will score >/= 45/80 on LEFS to improve her ability to perform ADL's and FA's.       Start:  04/15/24    Expected End:  05/20/24            Patient will report no >/= 2/10 pain with ambulating up to 15 minutes in the community with LRAD.       Start:  04/15/24    Expected End:  05/20/24                 Kae Desir, PT

## 2024-04-25 ENCOUNTER — APPOINTMENT (OUTPATIENT)
Dept: PHYSICAL THERAPY | Facility: HOSPITAL | Age: 69
End: 2024-04-25
Payer: MEDICARE

## 2024-04-29 ENCOUNTER — HOSPITAL ENCOUNTER (OUTPATIENT)
Dept: RADIOLOGY | Facility: HOSPITAL | Age: 69
Discharge: HOME | End: 2024-04-29
Payer: MEDICARE

## 2024-04-29 DIAGNOSIS — M54.42 LEFT-SIDED LOW BACK PAIN WITH LEFT-SIDED SCIATICA, UNSPECIFIED CHRONICITY: ICD-10-CM

## 2024-04-29 PROCEDURE — 72110 X-RAY EXAM L-2 SPINE 4/>VWS: CPT | Performed by: RADIOLOGY

## 2024-04-29 PROCEDURE — 72110 X-RAY EXAM L-2 SPINE 4/>VWS: CPT

## 2024-05-02 ENCOUNTER — TELEPHONE (OUTPATIENT)
Dept: PRIMARY CARE | Facility: CLINIC | Age: 69
End: 2024-05-02
Payer: MEDICARE

## 2024-05-02 DIAGNOSIS — M54.42 CHRONIC LOW BACK PAIN WITH LEFT-SIDED SCIATICA, UNSPECIFIED BACK PAIN LATERALITY: Primary | ICD-10-CM

## 2024-05-02 DIAGNOSIS — M21.372 LEFT FOOT DROP: ICD-10-CM

## 2024-05-02 DIAGNOSIS — G89.29 CHRONIC LOW BACK PAIN WITH LEFT-SIDED SCIATICA, UNSPECIFIED BACK PAIN LATERALITY: Primary | ICD-10-CM

## 2024-05-02 NOTE — TELEPHONE ENCOUNTER
Discussed today, please see annotation of xray for discussion  
She is able to see her xray results online, but doesn't really understand what they mean, will you interpret?  
spouse at bedside/unable to assess

## 2024-05-03 ENCOUNTER — TREATMENT (OUTPATIENT)
Dept: PHYSICAL THERAPY | Facility: HOSPITAL | Age: 69
End: 2024-05-03
Payer: MEDICARE

## 2024-05-03 DIAGNOSIS — M25.552 LEFT HIP PAIN: ICD-10-CM

## 2024-05-03 DIAGNOSIS — M54.32 SCIATICA OF LEFT SIDE: ICD-10-CM

## 2024-05-03 PROCEDURE — 97140 MANUAL THERAPY 1/> REGIONS: CPT | Mod: GP | Performed by: PHYSICAL THERAPIST

## 2024-05-03 PROCEDURE — 97110 THERAPEUTIC EXERCISES: CPT | Mod: GP | Performed by: PHYSICAL THERAPIST

## 2024-05-03 ASSESSMENT — PAIN - FUNCTIONAL ASSESSMENT: PAIN_FUNCTIONAL_ASSESSMENT: 0-10

## 2024-05-03 ASSESSMENT — PAIN SCALES - GENERAL: PAINLEVEL_OUTOF10: 7

## 2024-05-03 NOTE — PROGRESS NOTES
"  Physical Therapy Treatment    Patient Name: Janelle Pitt  MRN: [id number]  Today's Date: 5/3/2024  Time Calculation  Start Time: 1031  Stop Time: 1111  Time Calculation (min): 40 min    PT Therapeutic Procedures Time Entry  Manual Therapy Time Entry: 15  Therapeutic Exercise Time Entry: 25    Current Problem  Problem List Items Addressed This Visit             ICD-10-CM    Left hip pain M25.552    Sciatica of left side M54.32     1. Left hip pain  Follow Up In Physical Therapy      2. Sciatica of left side  Follow Up In Physical Therapy          General  Reason for Referral: L LE radicular symptoms  Referred By: Dr. Gurrola  Past Medical History Relevant to Rehab: h/o R JIE 2022  Preferred Learning Style: verbal, visual, written    Subjective   Current Condition:   Same  Patient reports that she is no better or worse. She has an MRI set up for her lumbar region next Friday.     Performing HEP?: Yes    Precautions  Precautions  STEADI Fall Risk Score (The score of 4 or more indicates an increased risk of falling): 8  Medical Precautions: Fall precautions  Pain  Pain Assessment: 0-10  Pain Score: 7  Pain Type: Neuropathic pain  Pain Location: Back  Pain Orientation: Left  Pain Radiating Towards: L foot  Pain Descriptors: Numbness, Radiating, Tightness, Aching  Pain Onset: Ongoing    Objective   Patient ambulating with std cane, unsteady gait, (+)L foot drop, L lateral shift    Treatments:    Therapeutic Exercise  Therapeutic Exercise Performed: Yes  Therapeutic Exercise Activity 1: Lateral shift correction with trunk extension x 10- PT providing end range OP  Therapeutic Exercise Activity 2: Prone lay hips L x 10  Therapeutic Exercise Activity 3: Prone on elbows hips L x 60\"  Therapeutic Exercise Activity 4: Prone press up with shift correction x 10  Therapeutic Exercise Activity 5: Prone press up with shift correction x 10 with OP      Manual Therapy  Manual Therapy Performed: Yes  Manual Therapy Activity 1: TPR/DTM L " lumbar paraspinals  Manual Therapy Activity 2: Grade III L/S DARIUS figueroa      EDUCATION:   Individual(s) Educated: Patient  Education Provided: Continue to focus on correcting lateral shift  Handout(s) Provided: Scanned into chart  Home Program: Continue shift correction exercises  Risk and Benefits Discussed with Patient/Caregiver/Other: Yes   Patient/Caregiver Demonstrated Understanding: Yes   Patient Response to Education: Patient/Caregiver verbalized understanding of information, Patient/Caregiver performed return demonstration of exercises/activities, and Patient/Caregiver asked appropriate questions    Assessment:   The patient continues to have L foot drop. The patient does feel looser and has decreased pain after TE's and manual. The patient is getting an MRI of the lumbar spine next week. The patient would like to continue with PT until she gets her MRI in order to help with management of her pain.  PT Assessment  PT Assessment Results: Decreased strength, Decreased range of motion, Decreased endurance, Impaired balance, Decreased mobility  Rehab Prognosis: Good    Plan: Continue with POC.   Continue to assess L lateral shift. Focus on shift correction.  OP PT Plan  Treatment/Interventions: Cryotherapy, Education/ Instruction, Gait training, Manual therapy, Neuromuscular re-education, Therapeutic activities, Therapeutic exercises  PT Plan: Skilled PT  PT Frequency: 2 times per week  Duration: 5 weeks  Onset Date: 02/01/24  Certification Period Start Date: 04/15/24  Certification Period End Date: 06/14/24  Number of Treatments Authorized: 4 of 6  Rehab Potential: Good  Plan of Care Agreement: Patient  Payor: [employer] / Plan: [employer] / Product Type: *No Product type* /     Goals:  Active       PT Problem       Patient will be independent with HEP.        Start:  04/15/24    Expected End:  04/29/24            Patient will demonstrate upright posture without a L lateral shift in order to  improve her pain.       Start:  04/15/24    Expected End:  05/13/24            Patient will demonstrate >/=4/5 strength with L ankle DF to improve her drop foot.       Start:  04/15/24    Expected End:  05/20/24            Patient will score >/= 45/80 on LEFS to improve her ability to perform ADL's and FA's.       Start:  04/15/24    Expected End:  05/20/24            Patient will report no >/= 2/10 pain with ambulating up to 15 minutes in the community with LRAD.       Start:  04/15/24    Expected End:  05/20/24                 Kae Desir, PT

## 2024-05-06 ENCOUNTER — APPOINTMENT (OUTPATIENT)
Dept: PHYSICAL THERAPY | Facility: HOSPITAL | Age: 69
End: 2024-05-06
Payer: MEDICARE

## 2024-05-09 ENCOUNTER — APPOINTMENT (OUTPATIENT)
Dept: PHYSICAL THERAPY | Facility: HOSPITAL | Age: 69
End: 2024-05-09
Payer: MEDICARE

## 2024-05-10 ENCOUNTER — HOSPITAL ENCOUNTER (OUTPATIENT)
Dept: RADIOLOGY | Facility: HOSPITAL | Age: 69
Discharge: HOME | End: 2024-05-10
Payer: MEDICARE

## 2024-05-10 DIAGNOSIS — M54.42 CHRONIC LOW BACK PAIN WITH LEFT-SIDED SCIATICA, UNSPECIFIED BACK PAIN LATERALITY: ICD-10-CM

## 2024-05-10 DIAGNOSIS — M21.372 LEFT FOOT DROP: ICD-10-CM

## 2024-05-10 DIAGNOSIS — G89.29 CHRONIC LOW BACK PAIN WITH LEFT-SIDED SCIATICA, UNSPECIFIED BACK PAIN LATERALITY: ICD-10-CM

## 2024-05-10 PROCEDURE — A9575 INJ GADOTERATE MEGLUMI 0.1ML: HCPCS | Performed by: FAMILY MEDICINE

## 2024-05-10 PROCEDURE — 2550000001 HC RX 255 CONTRASTS: Performed by: FAMILY MEDICINE

## 2024-05-10 PROCEDURE — 72158 MRI LUMBAR SPINE W/O & W/DYE: CPT

## 2024-05-10 RX ORDER — GADOTERATE MEGLUMINE 376.9 MG/ML
12 INJECTION INTRAVENOUS
Status: COMPLETED | OUTPATIENT
Start: 2024-05-10 | End: 2024-05-10

## 2024-05-10 RX ADMIN — GADOTERATE MEGLUMINE 12 ML: 376.9 INJECTION INTRAVENOUS at 15:38

## 2024-05-13 DIAGNOSIS — T14.8XXA NERVE COMPRESSION: Primary | ICD-10-CM

## 2024-05-13 DIAGNOSIS — M48.062 SPINAL STENOSIS OF LUMBAR REGION WITH NEUROGENIC CLAUDICATION: ICD-10-CM

## 2024-05-13 DIAGNOSIS — M21.379 FOOT-DROP, UNSPECIFIED LATERALITY: ICD-10-CM

## 2024-05-16 ENCOUNTER — TELEPHONE (OUTPATIENT)
Dept: PRIMARY CARE | Facility: CLINIC | Age: 69
End: 2024-05-16

## 2024-05-16 ENCOUNTER — APPOINTMENT (OUTPATIENT)
Dept: NEUROSURGERY | Facility: CLINIC | Age: 69
End: 2024-05-16
Payer: MEDICARE

## 2024-05-16 DIAGNOSIS — M54.32 SCIATICA OF LEFT SIDE: Primary | ICD-10-CM

## 2024-05-16 DIAGNOSIS — M25.552 LEFT HIP PAIN: ICD-10-CM

## 2024-05-16 RX ORDER — OXYCODONE AND ACETAMINOPHEN 5; 325 MG/1; MG/1
1 TABLET ORAL EVERY 12 HOURS PRN
Qty: 14 TABLET | Refills: 0 | Status: SHIPPED | OUTPATIENT
Start: 2024-05-16 | End: 2024-05-23

## 2024-05-16 NOTE — TELEPHONE ENCOUNTER
Will you call in oxyCODONE-acetaminophen (Percocet) 5-325 mg tablet, walking funny because of back pain and now hip is hurting.  She is going to see the neurosurgeon at [facility] on Tuesday, could she have something to help til then?

## 2024-05-20 NOTE — PROGRESS NOTES
It was a pleasure to see Ms. Pitt at the [facility] at [facility].     She is a really nice 68 y.o. female  who presents to us with complaints LOW BACK PAIN radiation to LEFT LEG  that started about  3 months ago, and have been gradually worsening since that time.  The symptoms started after no known injury    The pain is 7 /10. The pain is described as aching, burning, soreness, and stiffness and occurs all day.  Symptoms are exacerbated by  any body movement . Factors which relieve the pain include heat and ice      Numbness and/or tingling - YES left ankle, foot, and toes    Weakness : YES left leg balance is off.  She mentioned that she has weakness involving left leg and has difficulty bending her ankle upper with foot drop    Bladder/Bowel symptoms - NO    The patient has tried medications (eg: gabapentin, NSAIDS and narcotics ) : Yes Gabapentin  PT : No  Pain Management with ESIs/selective nerve blocks  - NO    she is a NON-SMOKER and NON-DIABETIC    History of Depression : NO    PRIOR SPINE SURGERY: NO    Denies use of Aspirin, Coumadin, or Plavix or any other anticoagulants     NARCOTICS for pain : NO    Part of this patient’s history is from personal review of the patient’s previous charts.      Past Medical History:   Diagnosis Date    Diverticulosis of large intestine without hemorrhage 01/22/2023    Hyperplastic colon polyp 01/22/2023    No pertinent past medical history     Osteoarthritis of right hip 01/22/2023    Sacroiliac joint dysfunction of right side 01/22/2023           Current Outpatient Medications:     cyclobenzaprine (Flexeril) 5 mg tablet, TAKE 1 TABLETS AT BEDTIME AS NEEDED FOR MUSCLE PAIN. DO NOT DRIVE OR OPERATE HEAVY MACHINERY WHILE TAKING, Disp: 90 tablet, Rfl: 1    gabapentin (Neurontin) 100 mg capsule, Take 1 capsule (100 mg) by mouth 3 times a day., Disp: 90 capsule, Rfl: 5    oxyCODONE-acetaminophen (Percocet) 5-325 mg tablet, Take 1 tablet by mouth every  12 hours if needed for severe pain (7 - 10) for up to 7 days., Disp: 14 tablet, Rfl: 0    predniSONE (Deltasone) 20 mg tablet, Take 2 tablets for 5 days, then take 1 tablet for 5 days, then take 1/2 tablet for 5 days (Patient not taking: Reported on 4/18/2024), Disp: 18 tablet, Rfl: 0      Review of Systems :   Constitutional: No fever or chills  Respiratory: No shortness of breath or wheezing  Musculoskeletal: see HPI above   Neurologic: See HPI above        EXAM:   There were no vitals filed for this visit.  NEURO: Neurologically patient is awake alert and oriented X 3    No obvious cranial nerve deficit.  Strength is almost 5/5 throughout all motor groups tested but she has significant and dense weakness involving left ankle dorsiflexion with motor strength of barely 1-2 out of 5 suggestive of almost complete foot drop.   Deep tendon reflexes are symmetric throughout but there is absence of knee jerk on the left side.  Gait : She has difficulty walking secondary to the foot drop and has been using a cane to walk.  Sensory examination is intact to touch and painful stimuli.    IMAGING:   Lateral long cassette scoliosis film done today shows presence of about 6 cm of left-sided coronal imbalance with no evidence of any sagittal plane deformity.  There is evidence of disc collapse and degeneration causing foraminal stenosis at multiple levels from L1 down to L5-S1.  MRI of the lumbar spine done on 5/10/2024 was reviewed personally and showed presence of severe degenerative changes involving the lumbar spine with complete disc disease and collapse multiple levels resulting in up-and-down stenosis of the foramina multiple levels from L1-2 down to L5-S1.  Foraminal stenosis is moderate at L1-L2 3 and L3-4 and severe at L4-5 and L5-S1 level.      ASSESSMENT AND PLAN:  Ms. Pitt is a really nice 68 y.o. female who presents to us with weakness involving the left lower extremity in the form of a foot drop along with severe  pain and bandlike sensation involving the ankle that has been going on for about 3 to 4 months or so.  She has been having difficulty walking secondary to the weakness and the severe pain that she has been experiencing in the left lower extremity.      I have reviewed imaging and diagnosis with the patient, discussed the natural history of the disease and both non-operative and operative treatments available and rationale vs risks for both.     The patient's clinical symptoms correlates well with the radiological findings.    She has been having significant functional impairment with decreased ability to perform her ADL secondary to pain and/or weakness    The pain has been debilitating on a daily basis with a score of more than 5 on scale of 0 - 10.    She has tried multiple  sessions of physical therapy but I have given her a prescription for another physical therapy focus on her weakness     The patient's mFI-5 score is Robust - 0     Considering the degree of pain and disability secondary to nerve root compression from multilevel foraminal stenosis and resultant foot drop surgery, surgery at this point is indicated and is medically necessary to improve the quality of life and day to day functioning and prevent deficit from becoming permanent.  Given the fact that her foot drop has been present for multiple weeks now indication for considering any urgent surgery and we will schedule her in the next few weeks or so and in the meantime she has agreed to work with physical therapy and undergo pain management to see if that would help her with her weakness and pain.    I offered her the option of surgery that would consist of L1-L2 3 L3-4 L4-5 laminectomy and facetectomy for decompression L5-S1 laminectomy and facetectomy for decompression with L5-S1 transforaminal lumbar interbody fusion with cage placement and T12-S1 instrumentation with fusion using autograft and BMP and pelvic fixation.      While there is no  evidence of instability or spondylolisthesis; because of the presence of FORAMINAL and extraforaminal severe stenosis as visualized on MRI and also per the radiology read confirming foraminal stenosis, extensive decompression with removal of almost the entire facet in the form of complete facetectomy unilaterally or more than 50 % of facet bilaterally will have to be performed at the operative levels that will result in destabilization of the spine/intraoperative spinal instability and hence would require concomitant stabilization and fusion via placement of instrumentation. A decompression alone here would result in significant post operative iatrogenic deformity that may mandate a major surgical and would be sub optimal treatment if performed.     I have explained the surgical procedure in detail with expected duration and extent of recovery along risks of surgery that include, but is not limited to bleeding, infection, blood vessel injury or damage,loss of sensation, loss of bladder, bowel or sexual function, nerve injury/damage resulting in weakness/paralysis, malunion, nonunion, CSF leak, brachial plexus injury, peripheral vision blindness, injury to the abdominal contents, failure of implants/fusion, failure to relieve symptoms, recurrent disease, adjacent segment disease, need to reoperate for any reason and general anesthesia reaction such as stroke, coma, heart attack, delirium, confusion, death as well as worsening of preexisted medical conditions and any other unforeseen complication related to unrelated to the spinal procedure per se.      A Shared decision was made to proceed with surgery after involving the patient and her family in the treatment decision-making process.  They clearly expressed understanding of possible risks and benefits of surgery and the realistic expectations of surgery along with the fact that the goal of the surgery is to improve the  overall functioning and quality of life by  improvement of the current level of function,  possible improvement and/or prevent progression of preexisting neurological deficits and not necessarily 100 % pain relief. There is no guarantee that the prexisiting deficits or symptoms will improve definitely after surgery. Also discussed with the patient a small but possible chances of worsening of her her symptoms or development of new symptoms despite a successful surgery that may be worse than what she has now. The option of continued non-operative management was very clearly discussed as well with its associated risks and benefits and the patient was clearly provided that option.     It was a pleasure to participate in Ms. Pitt clinical care. All questions were answered to her satisfaction and she explained understanding of the further treatment plan.       Jeff Barajas MD, [facility]   of Neurological Surgery  [employer] [facility] [employer]  Attending Surgeon  Director - Minimally Invasive Spine Surgery  [place], [place]      Some of this note was completed using Dragon voice recognition technology and sometimes the software misinterprets words. This may include unintended errors with respect to translation of words, typographical errors or grammar errors which may not have been identified prior to finalization of the chart note. Please take this into account when reading this note

## 2024-05-21 ENCOUNTER — HOSPITAL ENCOUNTER (OUTPATIENT)
Dept: RADIOLOGY | Facility: CLINIC | Age: 69
Discharge: HOME | End: 2024-05-21
Payer: MEDICARE

## 2024-05-21 ENCOUNTER — OFFICE VISIT (OUTPATIENT)
Dept: NEUROSURGERY | Facility: CLINIC | Age: 69
End: 2024-05-21
Payer: MEDICARE

## 2024-05-21 VITALS
DIASTOLIC BLOOD PRESSURE: 85 MMHG | TEMPERATURE: 97.9 F | HEART RATE: 90 BPM | SYSTOLIC BLOOD PRESSURE: 129 MMHG | WEIGHT: 148 LBS | HEIGHT: 61 IN | BODY MASS INDEX: 27.94 KG/M2

## 2024-05-21 DIAGNOSIS — M54.16 LUMBAR RADICULOPATHY, CHRONIC: ICD-10-CM

## 2024-05-21 DIAGNOSIS — M48.061 LUMBAR FORAMINAL STENOSIS: ICD-10-CM

## 2024-05-21 DIAGNOSIS — M48.061 SPINAL STENOSIS OF LUMBAR REGION, UNSPECIFIED WHETHER NEUROGENIC CLAUDICATION PRESENT: ICD-10-CM

## 2024-05-21 DIAGNOSIS — M41.55 SCOLIOSIS OF THORACOLUMBAR REGION DUE TO DEGENERATIVE DISEASE OF SPINE IN ADULT: ICD-10-CM

## 2024-05-21 DIAGNOSIS — M48.062 SPINAL STENOSIS OF LUMBAR REGION WITH NEUROGENIC CLAUDICATION: ICD-10-CM

## 2024-05-21 DIAGNOSIS — M21.372 LEFT FOOT DROP: ICD-10-CM

## 2024-05-21 DIAGNOSIS — M48.061 SPINAL STENOSIS OF LUMBAR REGION, UNSPECIFIED WHETHER NEUROGENIC CLAUDICATION PRESENT: Primary | ICD-10-CM

## 2024-05-21 PROCEDURE — 1159F MED LIST DOCD IN RCRD: CPT | Performed by: NEUROLOGICAL SURGERY

## 2024-05-21 PROCEDURE — 1125F AMNT PAIN NOTED PAIN PRSNT: CPT | Performed by: NEUROLOGICAL SURGERY

## 2024-05-21 PROCEDURE — 72082 X-RAY EXAM ENTIRE SPI 2/3 VW: CPT

## 2024-05-21 PROCEDURE — 3008F BODY MASS INDEX DOCD: CPT | Performed by: NEUROLOGICAL SURGERY

## 2024-05-21 PROCEDURE — 99215 OFFICE O/P EST HI 40 MIN: CPT | Performed by: NEUROLOGICAL SURGERY

## 2024-05-21 PROCEDURE — 1157F ADVNC CARE PLAN IN RCRD: CPT | Performed by: NEUROLOGICAL SURGERY

## 2024-05-21 PROCEDURE — 99205 OFFICE O/P NEW HI 60 MIN: CPT | Performed by: NEUROLOGICAL SURGERY

## 2024-05-21 PROCEDURE — 1160F RVW MEDS BY RX/DR IN RCRD: CPT | Performed by: NEUROLOGICAL SURGERY

## 2024-05-21 PROCEDURE — 72082 X-RAY EXAM ENTIRE SPI 2/3 VW: CPT | Performed by: RADIOLOGY

## 2024-05-21 PROCEDURE — 1036F TOBACCO NON-USER: CPT | Performed by: NEUROLOGICAL SURGERY

## 2024-05-21 ASSESSMENT — PAIN SCALES - GENERAL: PAINLEVEL: 6

## 2024-05-23 ENCOUNTER — TREATMENT (OUTPATIENT)
Dept: PHYSICAL THERAPY | Facility: HOSPITAL | Age: 69
End: 2024-05-23
Payer: MEDICARE

## 2024-05-23 DIAGNOSIS — M54.32 SCIATICA OF LEFT SIDE: ICD-10-CM

## 2024-05-23 DIAGNOSIS — M25.552 LEFT HIP PAIN: ICD-10-CM

## 2024-05-23 PROCEDURE — 97140 MANUAL THERAPY 1/> REGIONS: CPT | Mod: GP | Performed by: PHYSICAL THERAPIST

## 2024-05-23 PROCEDURE — 97110 THERAPEUTIC EXERCISES: CPT | Mod: GP | Performed by: PHYSICAL THERAPIST

## 2024-05-23 ASSESSMENT — PAIN SCALES - GENERAL: PAINLEVEL_OUTOF10: 6

## 2024-05-23 ASSESSMENT — PAIN - FUNCTIONAL ASSESSMENT: PAIN_FUNCTIONAL_ASSESSMENT: 0-10

## 2024-05-23 NOTE — PROGRESS NOTES
Physical Therapy Re-evaluation and Treatment    Patient Name: Janelle Pitt  MRN: [id number]  Today's Date: 5/23/2024  Time Calculation  Start Time: 1428  Stop Time: 1512  Time Calculation (min): 44 min    PT Therapeutic Procedures Time Entry  Manual Therapy Time Entry: 15  Therapeutic Exercise Time Entry: 29    Current Problem  Problem List Items Addressed This Visit             ICD-10-CM    Left hip pain M25.552    Sciatica of left side M54.32     1. Left hip pain  Follow Up In Physical Therapy      2. Sciatica of left side  Follow Up In Physical Therapy          General  Reason for Referral: L LE radicular symptoms  Referred By: Dr. Gurrola/Dr. Barajas  Past Medical History Relevant to Rehab: h/o R JIE 2022  Preferred Learning Style: verbal, visual, written    Subjective   Current Condition:   Same  Patient reports that she saw Dr. Barajas. The patient has scheduled surgery for later in June. She is to do a trial of pain management and continue with PT until her potential surgery. The patient is still having significant difficulty walking due to her drop foot and has mostly been staying at home due to severity of her impairments.    Performing HEP?: Yes    Precautions  Precautions  Medical Precautions: Fall precautions  Pain  Pain Assessment: 0-10  Pain Score: 6  Pain Type: Neuropathic pain  Pain Location: Back  Pain Orientation: Left  Pain Radiating Towards: L foot  Pain Descriptors: Numbness, Radiating, Tightness, Aching  Pain Onset: Ongoing    Objective       ROM     Lumbar AROM (%)     Flexion: 50%  Extension: 25%  (L) Side Bend: 50%  (R) Side Bend: 50%  (L) Rotation: 50%  (R) Rotation: 50%                            Strength Testing     Core/Abdominals: Fair     Hip                             (R)                    (L)  Flexion:            5/5                   4+/5                               Abduction:       4+/5                 4/5                                      Knee                          (R)     "                (L)  Flexion:            5/5                   4/5                                           Extension:        5/5                   4+/5          Ankle                          (R)                    (L)  Plantarflexion:  5/5                   4+/5                                           Dorsiflexion:     5/5                   3/5                                 Inversion:         5/5                   4-/5                                             Eversion:          5/5                   4-/5                                                          Functional Screening     Lower Extremity Functional Movements  Transfers: Modified Independent  Gait: antalgic (+) L foot drop, L out toe  Assistive Device: cane        Palpation: (+) TTP along lumbar paraspinals; L glutes/piriformis   a  Joint Mobility: L/S hypomobility     Observation: Patient has a L lateral trunk shift (shoulders to the L, hips to the R)     Posture: shoulder rounded forward and head forward    Outcome Measures:  Other Measures  Lower Extremity Funtional Score (LEFS): 29/80    Treatments:    Therapeutic Exercise  Therapeutic Exercise Performed: Yes  Therapeutic Exercise Activity 1: Lateral shift correction with trunk extension x 10- PT providing end range OP  Therapeutic Exercise Activity 2: Prone lay hips L x 10  Therapeutic Exercise Activity 3: Prone on elbows hips L x 60\"  Therapeutic Exercise Activity 4: Prone press up with shift correction x 10  Therapeutic Exercise Activity 5: Prone press up with shift correction x 10 with OP    Manual Therapy  Manual Therapy Performed: Yes  Manual Therapy Activity 1: TPR/DTM L lumbar paraspinals  Manual Therapy Activity 2: Grade III L/S PA mobs      EDUCATION:   Individual(s) Educated: Patient  Education Provided: anatomy, pain management  Handout(s) Provided: Scanned into chart  Home Program: Continue with core stabilizations and to focus on correcting lateral shift  Risk and Benefits " Discussed with Patient/Caregiver/Other: Yes   Patient/Caregiver Demonstrated Understanding: Yes   Patient Response to Education: Patient/Caregiver verbalized understanding of information, Patient/Caregiver performed return demonstration of exercises/activities, and Patient/Caregiver asked appropriate questions    Assessment:   The patient has made minimal progress in PT since starting. She continues to have L drop foot with a L lateral trunk shift. The patient may undergo a L1-L2 3 L3-4 L4-5 laminectomy and facetectomy for decompression L5-S1 laminectomy and facetectomy for decompression with L5-S1 transforaminal lumbar interbody fusion with cage placement and T12-S1 instrumentation with fusion using autograft and BMP and pelvic fixation. PT discussed obtaining an AFO OTC foot drop brace to help with her drop foot. If her drop foot doesn't resolve, patient would benefit from a custom AFO for the L foot. The patient demonstrates decreased core stability, flexibility, posture, strength, balance and gait. Additional skilled PT warranted to address the above stated impairments, so the patient can perform FA's without increased pain or difficulty.    PT Assessment  PT Assessment Results: Decreased strength, Decreased range of motion, Decreased endurance, Impaired balance, Decreased mobility  Rehab Prognosis: Good    Plan: Continue with POC.     OP PT Plan  Treatment/Interventions: Cryotherapy, Education/ Instruction, Gait training, Manual therapy, Neuromuscular re-education, Therapeutic activities, Therapeutic exercises  PT Plan: Skilled PT  PT Frequency: 1 time per week  Duration: 4 weeks  Onset Date: 02/01/24  Certification Period Start Date: 04/15/24  Certification Period End Date: 06/20/24  Number of Treatments Authorized: 5 of 9  Rehab Potential: Good  Plan of Care Agreement: Patient  Payor: [employer] / Plan: [employer]AGE / Product Type: *No Product type* /     Goals:  Active       PT Problem        Patient will be independent with HEP.  (Met)       Start:  04/15/24    Expected End:  04/29/24    Resolved:  05/23/24         Patient will demonstrate upright posture without a L lateral shift in order to improve her pain. (Progressing)       Start:  04/15/24    Expected End:  06/20/24            Patient will demonstrate >/=4/5 strength with L ankle DF to improve her drop foot. (Progressing)       Start:  04/15/24    Expected End:  06/20/24            Patient will score >/= 45/80 on LEFS to improve her ability to perform ADL's and FA's. (Progressing)       Start:  04/15/24    Expected End:  06/20/24            Patient will report no >/= 2/10 pain with ambulating up to 15 minutes in the community with LRAD. (Progressing)       Start:  04/15/24    Expected End:  06/20/24                 Kae Desir, PT

## 2024-05-24 DIAGNOSIS — M54.16 LUMBAR RADICULOPATHY, CHRONIC: ICD-10-CM

## 2024-05-24 DIAGNOSIS — M48.061 LUMBAR FORAMINAL STENOSIS: ICD-10-CM

## 2024-05-24 DIAGNOSIS — M48.061 SPINAL STENOSIS OF LUMBAR REGION, UNSPECIFIED WHETHER NEUROGENIC CLAUDICATION PRESENT: ICD-10-CM

## 2024-05-24 DIAGNOSIS — M41.55 SCOLIOSIS OF THORACOLUMBAR REGION DUE TO DEGENERATIVE DISEASE OF SPINE IN ADULT: ICD-10-CM

## 2024-05-24 DIAGNOSIS — M48.062 SPINAL STENOSIS OF LUMBAR REGION WITH NEUROGENIC CLAUDICATION: ICD-10-CM

## 2024-05-24 PROBLEM — M21.372 LEFT FOOT DROP: Status: ACTIVE | Noted: 2024-05-21

## 2024-05-30 ENCOUNTER — TREATMENT (OUTPATIENT)
Dept: PHYSICAL THERAPY | Facility: HOSPITAL | Age: 69
End: 2024-05-30
Payer: MEDICARE

## 2024-05-30 DIAGNOSIS — M25.552 LEFT HIP PAIN: ICD-10-CM

## 2024-05-30 DIAGNOSIS — M54.32 SCIATICA OF LEFT SIDE: ICD-10-CM

## 2024-05-30 PROCEDURE — 97140 MANUAL THERAPY 1/> REGIONS: CPT | Mod: GP | Performed by: PHYSICAL THERAPIST

## 2024-05-30 PROCEDURE — 97110 THERAPEUTIC EXERCISES: CPT | Mod: GP | Performed by: PHYSICAL THERAPIST

## 2024-05-30 ASSESSMENT — PAIN - FUNCTIONAL ASSESSMENT: PAIN_FUNCTIONAL_ASSESSMENT: 0-10

## 2024-05-30 ASSESSMENT — PAIN SCALES - GENERAL: PAINLEVEL_OUTOF10: 6

## 2024-05-30 NOTE — PROGRESS NOTES
"  Physical Therapy Treatment    Patient Name: Janelle Pitt  MRN: [id number]  Today's Date: 5/30/2024  Time Calculation  Start Time: 1446  Stop Time: 1515  Time Calculation (min): 29 min        PT Therapeutic Procedures Time Entry  Manual Therapy Time Entry: 10  Therapeutic Exercise Time Entry: 19    Current Problem  Problem List Items Addressed This Visit             ICD-10-CM    Left hip pain M25.552    Sciatica of left side M54.32     1. Left hip pain  Follow Up In Physical Therapy      2. Sciatica of left side  Follow Up In Physical Therapy          General  Reason for Referral: L LE radicular symptoms  Referred By: Dr. Gurrola/Dr. Barajas  Past Medical History Relevant to Rehab: h/o R JIE 2022  Preferred Learning Style: verbal, visual, written    Subjective   Current Condition:   Same  Patient reports that she has no change in symptoms yet. She is really hoping pain management will help her symptoms. Otherwise, she is going ahead with her surgery.    Performing HEP?: Yes    Precautions  Precautions  Medical Precautions: Fall precautions  Pain  Pain Assessment: 0-10  Pain Score: 6  Pain Type: Neuropathic pain  Pain Location: Back  Pain Orientation: Left  Pain Radiating Towards: L foot  Pain Descriptors: Numbness, Radiating, Tightness, Aching  Pain Onset: Ongoing    Objective   Patient ambulating with walking stick, L lateral shift, antalgic gait, L drop foot.    Treatments:    Therapeutic Exercise  Therapeutic Exercise Performed: Yes  Therapeutic Exercise Activity 1: Lateral shift correction with trunk extension x 10- PT providing end range OP  Therapeutic Exercise Activity 2: Prone lay hips L x 10  Therapeutic Exercise Activity 3: Prone on elbows hips L x 60\"  Therapeutic Exercise Activity 4: Prone press up with shift correction x 10  Therapeutic Exercise Activity 5: Prone press up with shift correction x 10 with OP  Therapeutic Exercise Activity 6: H/L TA hip add. small ball x 20  Therapeutic Exercise Activity 7: " H/L TA hip abd. blue band x 20    Manual Therapy  Manual Therapy Performed: Yes  Manual Therapy Activity 1: TPR/DTM L lumbar paraspinals  Manual Therapy Activity 2: Grade III L/S DARIUS figueroa      EDUCATION:   Individual(s) Educated: Patient  Education Provided: Reviewed lumbar anatomy  Handout(s) Provided: Scanned into chart  Home Program: Continue to focus on shift correction  Risk and Benefits Discussed with Patient/Caregiver/Other: Yes   Patient/Caregiver Demonstrated Understanding: Yes   Patient Response to Education: Patient/Caregiver verbalized understanding of information, Patient/Caregiver performed return demonstration of exercises/activities, and Patient/Caregiver asked appropriate questions    Assessment:   Patient is scheduled to undergo L1-L2 3 L3-4 L4-5 laminectomy and facetectomy for decompression L5-S1 laminectomy and facetectomy for decompression with L5-S1 transforaminal lumbar interbody fusion with cage placement and T12-S1 instrumentation with fusion using autograft and BMP and pelvic fixation on 07/19/2024. The patient continues to have L drop foot and difficulty ambulating. Patient has a L lateral shift that can be corrected but then quickly reverts back to shifted position.  PT Assessment  PT Assessment Results: Decreased strength, Decreased range of motion, Decreased endurance, Impaired balance, Decreased mobility  Rehab Prognosis: Good    Plan: Continue with POC.   F/U regarding insurance auth  OP PT Plan  Treatment/Interventions: Cryotherapy, Education/ Instruction, Gait training, Manual therapy, Neuromuscular re-education, Therapeutic activities, Therapeutic exercises  PT Plan: Skilled PT  PT Frequency: 1 time per week  Duration: 4 weeks  Onset Date: 02/01/24  Certification Period Start Date: 04/15/24  Certification Period End Date: 06/14/24  Number of Treatments Authorized: 6 of 6 Needs auth for additional visits  Rehab Potential: Good  Plan of Care Agreement: Patient  Payor: [employer]  / Plan: [employer]AGE / Product Type: *No Product type* /     Goals:  Active       PT Problem       Patient will be independent with HEP.  (Met)       Start:  04/15/24    Expected End:  04/29/24    Resolved:  05/23/24         Patient will demonstrate upright posture without a L lateral shift in order to improve her pain. (Progressing)       Start:  04/15/24    Expected End:  06/20/24            Patient will demonstrate >/=4/5 strength with L ankle DF to improve her drop foot. (Progressing)       Start:  04/15/24    Expected End:  06/20/24            Patient will score >/= 45/80 on LEFS to improve her ability to perform ADL's and FA's. (Progressing)       Start:  04/15/24    Expected End:  06/20/24            Patient will report no >/= 2/10 pain with ambulating up to 15 minutes in the community with LRAD. (Progressing)       Start:  04/15/24    Expected End:  06/20/24                 Kae Desir, PT

## 2024-06-04 ENCOUNTER — DOCUMENTATION (OUTPATIENT)
Dept: PHYSICAL THERAPY | Facility: HOSPITAL | Age: 69
End: 2024-06-04
Payer: MEDICARE

## 2024-06-04 ENCOUNTER — APPOINTMENT (OUTPATIENT)
Dept: PHYSICAL THERAPY | Facility: HOSPITAL | Age: 69
End: 2024-06-04
Payer: MEDICARE

## 2024-06-04 NOTE — PROGRESS NOTES
Physical Therapy                 Therapy Communication Note    Patient Name: Janelle Pitt  MRN: [id number]  Today's Date: 6/4/2024     Discipline: Physical Therapy    Missed Visit Reason:  Cancel    Missed Time: 4:45 PM 06/04/2024    Comment: No reason

## 2024-06-11 ENCOUNTER — OFFICE VISIT (OUTPATIENT)
Dept: PAIN MEDICINE | Facility: HOSPITAL | Age: 69
End: 2024-06-11
Payer: MEDICARE

## 2024-06-11 VITALS
RESPIRATION RATE: 14 BRPM | BODY MASS INDEX: 27.75 KG/M2 | HEART RATE: 95 BPM | SYSTOLIC BLOOD PRESSURE: 133 MMHG | HEIGHT: 61 IN | DIASTOLIC BLOOD PRESSURE: 88 MMHG | TEMPERATURE: 98.1 F | WEIGHT: 147 LBS

## 2024-06-11 DIAGNOSIS — M21.372 LEFT FOOT DROP: ICD-10-CM

## 2024-06-11 DIAGNOSIS — M48.062 LUMBAR STENOSIS WITH NEUROGENIC CLAUDICATION: ICD-10-CM

## 2024-06-11 DIAGNOSIS — M47.817 FACET ARTHROPATHY, LUMBOSACRAL: ICD-10-CM

## 2024-06-11 DIAGNOSIS — M41.9 SCOLIOSIS OF LUMBAR SPINE, UNSPECIFIED SCOLIOSIS TYPE: ICD-10-CM

## 2024-06-11 DIAGNOSIS — Z79.899 MEDICATION MANAGEMENT: ICD-10-CM

## 2024-06-11 DIAGNOSIS — M54.16 ACUTE LUMBAR RADICULOPATHY: Primary | ICD-10-CM

## 2024-06-11 LAB
AMPHETAMINES UR QL SCN: NORMAL
BARBITURATES UR QL SCN: NORMAL
BZE UR QL SCN: NORMAL
CANNABINOIDS UR QL SCN: NORMAL
CREAT UR-MCNC: 100.1 MG/DL (ref 20–320)
PCP UR QL SCN: NORMAL

## 2024-06-11 PROCEDURE — 82570 ASSAY OF URINE CREATININE: CPT | Performed by: NURSE PRACTITIONER

## 2024-06-11 PROCEDURE — 80365 DRUG SCREENING OXYCODONE: CPT | Performed by: NURSE PRACTITIONER

## 2024-06-11 PROCEDURE — 99214 OFFICE O/P EST MOD 30 MIN: CPT | Performed by: NURSE PRACTITIONER

## 2024-06-11 PROCEDURE — 80354 DRUG SCREENING FENTANYL: CPT | Performed by: NURSE PRACTITIONER

## 2024-06-11 RX ORDER — OXYCODONE AND ACETAMINOPHEN 5; 325 MG/1; MG/1
1 TABLET ORAL AS NEEDED
COMMUNITY
End: 2024-06-11 | Stop reason: ALTCHOICE

## 2024-06-11 ASSESSMENT — ENCOUNTER SYMPTOMS
PSYCHIATRIC NEGATIVE: 1
BACK PAIN: 1
JOINT SWELLING: 0
RESPIRATORY NEGATIVE: 1
ARTHRALGIAS: 1
ALLERGIC/IMMUNOLOGIC NEGATIVE: 1
NECK PAIN: 0
CARDIOVASCULAR NEGATIVE: 1
NECK STIFFNESS: 0
NEUROLOGICAL NEGATIVE: 1
ENDOCRINE NEGATIVE: 1
EYES NEGATIVE: 1
CONSTITUTIONAL NEGATIVE: 1
GASTROINTESTINAL NEGATIVE: 1
MYALGIAS: 1

## 2024-06-11 ASSESSMENT — PAIN SCALES - GENERAL: PAINLEVEL: 5

## 2024-06-11 NOTE — PATIENT INSTRUCTIONS
Continue taking gabapentin 100 mg 3 times a day.    Take ibuprofen or Tylenol for pain relief.    ---------------    Your next appointment is with Dr. Schrader in:    [facility]    For pain block/injection on: 6/25/2024 left L4-L5 and L5-S1 transforaminal epidural steroid injection,     SEDATION: You must NOT eat or drink 6 hours before the procedure and you must have a  with you to take you home if planning to have a sedation with your block.    No ibuprofen on this day     °You will receive a phone call the weekday before your appointment/procedure.   °Please KEEP your scheduled appointments.     °BRING your photo ID, insurance information, and a correct list of your home medications to EVERY visit.    °Please call our office at [phone] if you have any questions.     You will then be seen for a postprocedure follow-up in 4 to 6 weeks    Antibiotic: You will receive Cipro before the procedure due to your history of right total hip    ---------------

## 2024-06-11 NOTE — PROGRESS NOTES
Last urine drug screening date/ordered today: 06/11/24    Opioid Risk Screening:   THE OPIOID RISK TOOL (ORT)                                                                      Female                     Male     1. Family History of Substance Abuse:                              Alcohol                                                   [1]= 0                          [3]  =     Illicit Drugs                                             [2] =0                          [3]   =    Prescription Drugs                                [4]=  0                         [4]   =    2. Personal History of Substance Abuse:     Alcohol                                                    [3] = 0                         [3] =     Illegal Drugs                                           [4] = 0                          [4]  =     Prescription Drugs                                [5]= 0                           [5]   =    3. Age (If between 16 to 45):               [1]=  0                         [1]   =    4. History of Preadolescent Sexual Abuse                                                                  [3]= 0                           [0]   =    5. Psychological Disease:    ADD, OCD, Bipolar, Schizophrenia    [2]=  0                          [2]   =    Depression                                          [1]=0                             [1]   =      TOTAL Score =  0     Last opioid risk screening date/ordered today: 6/11/2024  Patient's total score is 0    Reference :  Low Score = 0 to 3  Moderate Score = 4 to 7  High Score = =8       Pain Scale Screening:   Pain Assessment and Documentation Tool (PADT)   Date of Assessment: 6/11/2024  Analgesia:   Patient reports her pain level on average during the past week is 5on a 0 - 10 scale.   Patient reports that her pain level at its worst during the past week was 7 on a 0 -10 scale.   -----------------------

## 2024-06-11 NOTE — PROGRESS NOTES
Subjective   Patient ID: Janelle Pitt is a 68 y.o. female who presents for Pain (Left foot drop ).    Janelle is a pleasant 68-year-old  female who is here to establish care with pain management.  Patient is ambulatory with the use of wooden staff.  Gait is steady but is dragging her left foot.  She arrives with her spouse.      Patient reports that Dr. Barajas informed her to have physical therapy and to see pain management.  She reports that she was told to see pain management to see if it would help.  She has a scheduled surgical procedure with him on 7/19/2024.  Patient is undecided if she is going to proceed with the surgery.    Patient has pain to her left ankle and foot.  She reports the whole left foot is numb.  Pain and numbness is constant.  She rates her pain as 5-6 out of 10.  She describes her pain as sharp and tightness.  Left foot numbness started in February of this year and is worsening.  Left foot is dragging as the day goes.  She denies increasing leg weakness or change in balance.  She denies bowel or bladder incontinence.  She denies recent falls, injuries, or inciting events.  She denies back injury or surgery.    Patient had seen pain management in the past.  She saw Dr. John where she had the hip injection.  She reports it did not help.  She was advised to have surgery and she did have the right total hip on December 2022 with Dr. Grider.    Patient was prescribed Percocet and was done with this medication.  Based on [facility] report she received 14 tabs on 5/16/2024.  She is taking Tylenol or ibuprofen.  She was prescribed gabapentin 100 mg 3 times a day however she is only taking this as needed.  I encouraged her to take this routinely.  She takes Flexeril for muscle pain relief.  She denies side effects to her medications.    I have reviewed previous notes.  Patient has MRI of her lumbar spine that was on 5/10/2024 and I reviewed the results with them using the spine model.  I  discussed the plan of care including pharmacologic and joint interventional procedure.  I discussed transforaminal VICKEY and she is in agreement to proceed to this procedure.  This is done under fluoroscopy.  She would like it with sedation due to the pain.  If procedure is unsuccessful then she will proceed with the surgery.  Questions were answered during this encounter.    CAPRICE was done today where she scored 36.  This form was scanned and included in this note.    During exam, patient noted to have scoliosis.  I spent a lot of time discussing Schroth method of exercise and also demonstrating with proper posture.          [facility]:  Stephanie Leyva, APRN-CNP, DNP on 6/11/2024 10:39 AM  I have personally reviewed the [facility] report for Janelle Pitt. 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    Surgical History  Past Surgical History:   Procedure Laterality Date    TOTAL HIP ARTHROPLASTY Right 12/12/2022        Social History  She reports that she has never smoked. She has never used smokeless tobacco. She reports current alcohol use. She reports that she does not use drugs.    Family History  Family History   Problem Relation Name Age of Onset    Alzheimer's disease Mother          Allergies  Patient has no known allergies.    Medications    Current Outpatient Medications:     cyclobenzaprine (Flexeril) 5 mg tablet, TAKE 1 TABLETS AT BEDTIME AS NEEDED FOR MUSCLE PAIN. DO NOT DRIVE OR OPERATE HEAVY MACHINERY WHILE TAKING, Disp: 90 tablet, Rfl: 1    gabapentin (Neurontin) 100 mg capsule, Take 1 capsule (100 mg) by mouth 3 times a day., Disp: 90 capsule, Rfl: 5     Review of Systems    Constitutional: Negative.    HENT: Negative.     Eyes: Negative.    Respiratory: Negative.     Cardiovascular: Negative.    Gastrointestinal: Negative.    Endocrine: Negative.    Genitourinary: Negative.    Musculoskeletal:  Positive for arthralgias, back pain and myalgias. Negative for gait problem, joint swelling, neck pain and neck stiffness.   Skin: Negative.    Allergic/Immunologic: Negative.    Neurological: Negative.    Psychiatric/Behavioral: Negative.          Physical Exam  Vitals and nursing note reviewed.   HENT:      Head: Normocephalic.      Nose: Nose normal.   Eyes:      Extraocular Movements: Extraocular movements intact.      Conjunctiva/sclera: Conjunctivae normal.      Pupils: Pupils are equal, round, and reactive to light.   Cardiovascular:      Rate and Rhythm: Normal rate and regular rhythm.   Pulmonary:      Effort: Pulmonary effort is normal.      Breath sounds: Normal breath sounds.   Musculoskeletal:         General: Tenderness present. No swelling, deformity or signs of injury.      Cervical back: No rigidity or tenderness.      Lumbar back: Tenderness present.      Right lower leg: No edema.      Left lower leg: No edema.      Comments: No neck rigidity.  Full range of motion.  Negative for Spurling test bilaterally.  Negative for paraspinal tenderness at the cervical region.  Positive for minimal paraspinal tenderness at the lumbar region bilaterally at L4-L5, L5-S1 with rotation.  With left-sided radicular symptoms that follows L4, L5 and S1 distribution.  Positive left leg raise eliciting back pain.  Unable to raise left leg more than 30 degree  BUE 4-5/5, RLE 4-5/5, LLE 3/5.  Scoliosis noted   Skin:     General: Skin is warm and dry.   Neurological:      General: No focal deficit present.      Mental Status: She is alert and oriented to person, place, and time.   Psychiatric:         Mood and Affect: Mood normal.         Behavior: Behavior normal.          Last Recorded Vitals  BP  133/88   Pulse 95   Temp 36.7 °C (98.1 °F) (Temporal)   Resp 14   Wt 66.7 kg (147 lb)     Relevant Results      Pain Management Panel          Latest Ref Rng & Units 8/10/2022   Pain Management Panel   Amphetamine Screen, Urine NEGATIVE PRESUMPTIVE NEGATIVE    Barbiturate Screen, Urine NEGATIVE PRESUMPTIVE NEGATIVE    Codeine IgE Cutoff <50 ng/mL <50    Hydromorphone Urine Cutoff <25 ng/mL <25    Morphine  Cutoff <50 ng/mL <50         -------------  Narrative & Impression   Interpreted By:  Eliecer Cui,  and Daly Fine   STUDY:  MR LUMBAR SPINE W AND WO IV CONTRAST;  5/10/2024 3:23 pm      INDICATION:  Signs/Symptoms:pain in low back pain, getting worse, and having  significant difficulty walking and having foot drop which is  worsening.      COMPARISON:  None.      ACCESSION NUMBER(S):  [id number]      ORDERING CLINICIAN:  AZALEA WARD      TECHNIQUE:  Sagittal T1, T2, STIR, axial T1 and T2 weighted images of the lumbar  spine were acquired.      FINDINGS:  Alignment:  images demonstrate mild dextroscoliosis. There is  straightening of the normal lumbar lordosis which may be secondary to  patient's positioning versus muscle spasm.      Vertebrae/Intervertebral Discs: The vertebral bodies demonstrate  expected height. Heterogeneous marrow signal is within normal limits.  There is multilevel endplate degenerative changes with disc  desiccation and loss disc height with superimposed areas of endplate  marrow reactive changes most pronounced at T11-T12, L2-L3, L4-L5 and  L5-S1. Mild increased STIR hyperintense signal along the anterior  aspect of the L5 vertebral body, likely represents reactive changes.      Conus medullaris: The lower thoracic cord appears unremarkable. The  conus medullaris terminates at L1-L2.      T10-T11: Mild posterior disc bulge resulting in effacement of the  ventral thecal sac. There is mild central canal stenosis. Moderate  bilateral neural foraminal stenosis.       T11-T12: Mild posterior disc bulge effacing the ventral thecal sac  with mild central canal stenosis. Moderate right and mild left neural  foraminal stenosis.      T12-L1: Diffuse posterior disc osteophytic complex and mild  ligamentum flavum hypertrophy resulting in mild central canal  stenosis. Mild bilateral neural foraminal stenosis.      L1-2: Diffuse posterior disc osteophytic complex and mild facet  osteoarthrosis resulting in mild central canal stenosis. There is  narrowing of the left subarticular recess. Moderate left and mild  right neural foraminal stenosis.      L2-3: Diffuse posterior disc osteophytic complex, epidural fatty  hypertrophy and mild facet osteoarthrosis results in moderate central  canal stenosis. There is narrowing of the bilateral subarticular  recess. There is moderate bilateral neural foraminal stenosis.      L3-4: Diffuse posterior disc osteophytic complex, epidural fatty  hypertrophy and facet osteoarthrosis results in severe central canal  stenosis. Moderate bilateral neural foraminal stenosis.      L4-5: Diffuse posterior disc osteophytic complex, facet  osteoarthrosis and epidural fatty hypertrophy results in severe  central canal stenosis. There is moderate to severe bilateral neural  foraminal stenosis.      L5-S1: Diffuse posterior disc osteophytic complex, facet  osteoarthrosis and ligamentum flavum hypertrophy without significant  central canal stenosis. Moderate to severe bilateral neural foraminal  stenosis.      The prevertebral and posterior paraspinous soft tissues are  unremarkable.      IMPRESSION:  1. Multilevel degenerative changes of the lumbar spine with  high-grade central canal and neural foraminal stenosis most  pronounced at L3-L4 and L4-L5 with likely compression of traversing  nerve roots in these regions. Please correlate clinically.   -------------     Media Information            Assessment/Plan   Problem List Items Addressed This Visit              ICD-10-CM    Left foot drop M21.372    Relevant Orders    Referral to Podiatry    Acute lumbar radiculopathy - Primary M54.16    Relevant Orders    Transforaminal    Lumbar stenosis with neurogenic claudication M48.062    Relevant Orders    Transforaminal    Facet arthropathy, lumbosacral M47.817    Scoliosis of lumbar spine M41.9     Other Visit Diagnoses         Codes    Medication management     Z79.899    Relevant Orders    Opiate/Opioid/Benzo Prescription Compliance    OOB Internal Tracking                   1. Acute lumbar radiculopathy  - Transforaminal; Future    2. Lumbar stenosis with neurogenic claudication  - Transforaminal; Future    3. Facet arthropathy, lumbosacral  - Continue with gabapentin and Tylenol    4. Left foot drop  - Referral to Podiatry; Future    5. Scoliosis of lumbar spine, unspecified scoliosis type  -Schroth method of exercise    6. Medication management  - Opiate/Opioid/Benzo Prescription Compliance; Future  - Opiate/Opioid/Benzo Prescription Compliance  - OOB Internal Tracking       Plan/Follow-up Instructions:   Continue taking gabapentin 100 mg 3 times a day.    Take ibuprofen or Tylenol for pain relief.    ---------------    Your next appointment is with Dr. Schrader in:    [facility]    For pain block/injection on: 6/25/2024 left L4-L5 and L5-S1 transforaminal epidural steroid injection,     SEDATION: You must NOT eat or drink 6 hours before the procedure and you must have a  with you to take you home if planning to have a sedation with your block.    No ibuprofen on this day     °You will receive a phone call the weekday before your appointment/procedure.   °Please KEEP your scheduled appointments.     °BRING your photo ID, insurance information, and a correct list of your home medications to EVERY visit.    °Please call our office at [phone] if you have any questions.     You will then be seen for a postprocedure follow-up in 4 to 6  weeks    Antibiotic: You will receive Cipro before the procedure due to your history of right total hip    ---------------        ---------------------  Disclaimer: This note was created using voice recognition software. It was not corrected for typographical or grammatical errors, inadvertent word insertion, or any unintended errors. Please feel free to contact me for clarification.  -----------------    Time     Prep time on date of the patient encounter: 5  Time spent directly with patient/family/caregiver: 45   Documentation time: 5  Total time on date of patient encounter: 55        Stephanie Leyva DNP, APRN, FNP-C    [facility]  Office #: [phone]  Fax # [phone]

## 2024-06-11 NOTE — H&P (VIEW-ONLY)
Subjective   Patient ID: Janelle Pitt is a 68 y.o. female who presents for Pain (Left foot drop ).    Janelle is a pleasant 68-year-old  female who is here to establish care with pain management.  Patient is ambulatory with the use of wooden staff.  Gait is steady but is dragging her left foot.  She arrives with her spouse.      Patient reports that Dr. Barajas informed her to have physical therapy and to see pain management.  She reports that she was told to see pain management to see if it would help.  She has a scheduled surgical procedure with him on 7/19/2024.  Patient is undecided if she is going to proceed with the surgery.    Patient has pain to her left ankle and foot.  She reports the whole left foot is numb.  Pain and numbness is constant.  She rates her pain as 5-6 out of 10.  She describes her pain as sharp and tightness.  Left foot numbness started in February of this year and is worsening.  Left foot is dragging as the day goes.  She denies increasing leg weakness or change in balance.  She denies bowel or bladder incontinence.  She denies recent falls, injuries, or inciting events.  She denies back injury or surgery.    Patient had seen pain management in the past.  She saw Dr. John where she had the hip injection.  She reports it did not help.  She was advised to have surgery and she did have the right total hip on December 2022 with Dr. Grider.    Patient was prescribed Percocet and was done with this medication.  Based on [facility] report she received 14 tabs on 5/16/2024.  She is taking Tylenol or ibuprofen.  She was prescribed gabapentin 100 mg 3 times a day however she is only taking this as needed.  I encouraged her to take this routinely.  She takes Flexeril for muscle pain relief.  She denies side effects to her medications.    I have reviewed previous notes.  Patient has MRI of her lumbar spine that was on 5/10/2024 and I reviewed the results with them using the spine model.  I  discussed the plan of care including pharmacologic and joint interventional procedure.  I discussed transforaminal VICKEY and she is in agreement to proceed to this procedure.  This is done under fluoroscopy.  She would like it with sedation due to the pain.  If procedure is unsuccessful then she will proceed with the surgery.  Questions were answered during this encounter.    CAPRICE was done today where she scored 36.  This form was scanned and included in this note.    During exam, patient noted to have scoliosis.  I spent a lot of time discussing Schroth method of exercise and also demonstrating with proper posture.          [facility]:  Stepahnie Leyva, APRN-CNP, DNP on 6/11/2024 10:39 AM  I have personally reviewed the [facility] report for Janelle Pitt. 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    Surgical History  Past Surgical History:   Procedure Laterality Date    TOTAL HIP ARTHROPLASTY Right 12/12/2022        Social History  She reports that she has never smoked. She has never used smokeless tobacco. She reports current alcohol use. She reports that she does not use drugs.    Family History  Family History   Problem Relation Name Age of Onset    Alzheimer's disease Mother          Allergies  Patient has no known allergies.    Medications    Current Outpatient Medications:     cyclobenzaprine (Flexeril) 5 mg tablet, TAKE 1 TABLETS AT BEDTIME AS NEEDED FOR MUSCLE PAIN. DO NOT DRIVE OR OPERATE HEAVY MACHINERY WHILE TAKING, Disp: 90 tablet, Rfl: 1    gabapentin (Neurontin) 100 mg capsule, Take 1 capsule (100 mg) by mouth 3 times a day., Disp: 90 capsule, Rfl: 5     Review of Systems    Constitutional: Negative.    HENT: Negative.     Eyes: Negative.    Respiratory: Negative.     Cardiovascular: Negative.    Gastrointestinal: Negative.    Endocrine: Negative.    Genitourinary: Negative.    Musculoskeletal:  Positive for arthralgias, back pain and myalgias. Negative for gait problem, joint swelling, neck pain and neck stiffness.   Skin: Negative.    Allergic/Immunologic: Negative.    Neurological: Negative.    Psychiatric/Behavioral: Negative.          Physical Exam  Vitals and nursing note reviewed.   HENT:      Head: Normocephalic.      Nose: Nose normal.   Eyes:      Extraocular Movements: Extraocular movements intact.      Conjunctiva/sclera: Conjunctivae normal.      Pupils: Pupils are equal, round, and reactive to light.   Cardiovascular:      Rate and Rhythm: Normal rate and regular rhythm.   Pulmonary:      Effort: Pulmonary effort is normal.      Breath sounds: Normal breath sounds.   Musculoskeletal:         General: Tenderness present. No swelling, deformity or signs of injury.      Cervical back: No rigidity or tenderness.      Lumbar back: Tenderness present.      Right lower leg: No edema.      Left lower leg: No edema.      Comments: No neck rigidity.  Full range of motion.  Negative for Spurling test bilaterally.  Negative for paraspinal tenderness at the cervical region.  Positive for minimal paraspinal tenderness at the lumbar region bilaterally at L4-L5, L5-S1 with rotation.  With left-sided radicular symptoms that follows L4, L5 and S1 distribution.  Positive left leg raise eliciting back pain.  Unable to raise left leg more than 30 degree  BUE 4-5/5, RLE 4-5/5, LLE 3/5.  Scoliosis noted   Skin:     General: Skin is warm and dry.   Neurological:      General: No focal deficit present.      Mental Status: She is alert and oriented to person, place, and time.   Psychiatric:         Mood and Affect: Mood normal.         Behavior: Behavior normal.          Last Recorded Vitals  BP  133/88   Pulse 95   Temp 36.7 °C (98.1 °F) (Temporal)   Resp 14   Wt 66.7 kg (147 lb)     Relevant Results      Pain Management Panel          Latest Ref Rng & Units 8/10/2022   Pain Management Panel   Amphetamine Screen, Urine NEGATIVE PRESUMPTIVE NEGATIVE    Barbiturate Screen, Urine NEGATIVE PRESUMPTIVE NEGATIVE    Codeine IgE Cutoff <50 ng/mL <50    Hydromorphone Urine Cutoff <25 ng/mL <25    Morphine  Cutoff <50 ng/mL <50         -------------  Narrative & Impression   Interpreted By:  Eliecer Cui,  and Daly Fine   STUDY:  MR LUMBAR SPINE W AND WO IV CONTRAST;  5/10/2024 3:23 pm      INDICATION:  Signs/Symptoms:pain in low back pain, getting worse, and having  significant difficulty walking and having foot drop which is  worsening.      COMPARISON:  None.      ACCESSION NUMBER(S):  [id number]      ORDERING CLINICIAN:  AZALEA WARD      TECHNIQUE:  Sagittal T1, T2, STIR, axial T1 and T2 weighted images of the lumbar  spine were acquired.      FINDINGS:  Alignment:  images demonstrate mild dextroscoliosis. There is  straightening of the normal lumbar lordosis which may be secondary to  patient's positioning versus muscle spasm.      Vertebrae/Intervertebral Discs: The vertebral bodies demonstrate  expected height. Heterogeneous marrow signal is within normal limits.  There is multilevel endplate degenerative changes with disc  desiccation and loss disc height with superimposed areas of endplate  marrow reactive changes most pronounced at T11-T12, L2-L3, L4-L5 and  L5-S1. Mild increased STIR hyperintense signal along the anterior  aspect of the L5 vertebral body, likely represents reactive changes.      Conus medullaris: The lower thoracic cord appears unremarkable. The  conus medullaris terminates at L1-L2.      T10-T11: Mild posterior disc bulge resulting in effacement of the  ventral thecal sac. There is mild central canal stenosis. Moderate  bilateral neural foraminal stenosis.       T11-T12: Mild posterior disc bulge effacing the ventral thecal sac  with mild central canal stenosis. Moderate right and mild left neural  foraminal stenosis.      T12-L1: Diffuse posterior disc osteophytic complex and mild  ligamentum flavum hypertrophy resulting in mild central canal  stenosis. Mild bilateral neural foraminal stenosis.      L1-2: Diffuse posterior disc osteophytic complex and mild facet  osteoarthrosis resulting in mild central canal stenosis. There is  narrowing of the left subarticular recess. Moderate left and mild  right neural foraminal stenosis.      L2-3: Diffuse posterior disc osteophytic complex, epidural fatty  hypertrophy and mild facet osteoarthrosis results in moderate central  canal stenosis. There is narrowing of the bilateral subarticular  recess. There is moderate bilateral neural foraminal stenosis.      L3-4: Diffuse posterior disc osteophytic complex, epidural fatty  hypertrophy and facet osteoarthrosis results in severe central canal  stenosis. Moderate bilateral neural foraminal stenosis.      L4-5: Diffuse posterior disc osteophytic complex, facet  osteoarthrosis and epidural fatty hypertrophy results in severe  central canal stenosis. There is moderate to severe bilateral neural  foraminal stenosis.      L5-S1: Diffuse posterior disc osteophytic complex, facet  osteoarthrosis and ligamentum flavum hypertrophy without significant  central canal stenosis. Moderate to severe bilateral neural foraminal  stenosis.      The prevertebral and posterior paraspinous soft tissues are  unremarkable.      IMPRESSION:  1. Multilevel degenerative changes of the lumbar spine with  high-grade central canal and neural foraminal stenosis most  pronounced at L3-L4 and L4-L5 with likely compression of traversing  nerve roots in these regions. Please correlate clinically.   -------------     Media Information            Assessment/Plan   Problem List Items Addressed This Visit              ICD-10-CM    Left foot drop M21.372    Relevant Orders    Referral to Podiatry    Acute lumbar radiculopathy - Primary M54.16    Relevant Orders    Transforaminal    Lumbar stenosis with neurogenic claudication M48.062    Relevant Orders    Transforaminal    Facet arthropathy, lumbosacral M47.817    Scoliosis of lumbar spine M41.9     Other Visit Diagnoses         Codes    Medication management     Z79.899    Relevant Orders    Opiate/Opioid/Benzo Prescription Compliance    OOB Internal Tracking                   1. Acute lumbar radiculopathy  - Transforaminal; Future    2. Lumbar stenosis with neurogenic claudication  - Transforaminal; Future    3. Facet arthropathy, lumbosacral  - Continue with gabapentin and Tylenol    4. Left foot drop  - Referral to Podiatry; Future    5. Scoliosis of lumbar spine, unspecified scoliosis type  -Schroth method of exercise    6. Medication management  - Opiate/Opioid/Benzo Prescription Compliance; Future  - Opiate/Opioid/Benzo Prescription Compliance  - OOB Internal Tracking       Plan/Follow-up Instructions:   Continue taking gabapentin 100 mg 3 times a day.    Take ibuprofen or Tylenol for pain relief.    ---------------    Your next appointment is with Dr. Schrader in:    [facility]    For pain block/injection on: 6/25/2024 left L4-L5 and L5-S1 transforaminal epidural steroid injection,     SEDATION: You must NOT eat or drink 6 hours before the procedure and you must have a  with you to take you home if planning to have a sedation with your block.    No ibuprofen on this day     °You will receive a phone call the weekday before your appointment/procedure.   °Please KEEP your scheduled appointments.     °BRING your photo ID, insurance information, and a correct list of your home medications to EVERY visit.    °Please call our office at [phone] if you have any questions.     You will then be seen for a postprocedure follow-up in 4 to 6  weeks    Antibiotic: You will receive Cipro before the procedure due to your history of right total hip    ---------------        ---------------------  Disclaimer: This note was created using voice recognition software. It was not corrected for typographical or grammatical errors, inadvertent word insertion, or any unintended errors. Please feel free to contact me for clarification.  -----------------    Time     Prep time on date of the patient encounter: 5  Time spent directly with patient/family/caregiver: 45   Documentation time: 5  Total time on date of patient encounter: 55        Stephanie Leyva DNP, APRN, FNP-C    [facility]  Office #: [phone]  Fax # [phone]

## 2024-06-13 ENCOUNTER — APPOINTMENT (OUTPATIENT)
Dept: PHYSICAL THERAPY | Facility: HOSPITAL | Age: 69
End: 2024-06-13
Payer: MEDICARE

## 2024-06-13 LAB
1OH-MIDAZOLAM UR CFM-MCNC: <25 NG/ML
6MAM UR CFM-MCNC: <25 NG/ML
7AMINOCLONAZEPAM UR CFM-MCNC: <25 NG/ML
A-OH ALPRAZ UR CFM-MCNC: <25 NG/ML
ALPRAZ UR CFM-MCNC: <25 NG/ML
CHLORDIAZEP UR CFM-MCNC: <25 NG/ML
CLONAZEPAM UR CFM-MCNC: <25 NG/ML
CODEINE UR CFM-MCNC: <50 NG/ML
DIAZEPAM UR CFM-MCNC: <25 NG/ML
EDDP UR CFM-MCNC: <25 NG/ML
FENTANYL UR CFM-MCNC: <2.5 NG/ML
HYDROCODONE CTO UR CFM-MCNC: <25 NG/ML
HYDROMORPHONE UR CFM-MCNC: <25 NG/ML
LORAZEPAM UR CFM-MCNC: <25 NG/ML
METHADONE UR CFM-MCNC: <25 NG/ML
MIDAZOLAM UR CFM-MCNC: <25 NG/ML
MORPHINE UR CFM-MCNC: <50 NG/ML
NORDIAZEPAM UR CFM-MCNC: <25 NG/ML
NORFENTANYL UR CFM-MCNC: <2.5 NG/ML
NORHYDROCODONE UR CFM-MCNC: <25 NG/ML
NOROXYCODONE UR CFM-MCNC: 132 NG/ML
NORTRAMADOL UR-MCNC: <50 NG/ML
OXAZEPAM UR CFM-MCNC: <25 NG/ML
OXYCODONE UR CFM-MCNC: <25 NG/ML
OXYMORPHONE UR CFM-MCNC: <25 NG/ML
TEMAZEPAM UR CFM-MCNC: <25 NG/ML
TRAMADOL UR CFM-MCNC: <50 NG/ML
ZOLPIDEM UR CFM-MCNC: <25 NG/ML
ZOLPIDEM UR-MCNC: <25 NG/ML

## 2024-06-20 DIAGNOSIS — M54.16 CHRONIC LUMBAR RADICULOPATHY: Primary | ICD-10-CM

## 2024-06-25 ENCOUNTER — HOSPITAL ENCOUNTER (OUTPATIENT)
Dept: RADIOLOGY | Facility: HOSPITAL | Age: 69
Discharge: HOME | End: 2024-06-25
Payer: MEDICARE

## 2024-06-25 ENCOUNTER — HOSPITAL ENCOUNTER (OUTPATIENT)
Dept: PAIN MEDICINE | Facility: HOSPITAL | Age: 69
Discharge: HOME | End: 2024-06-25
Payer: MEDICARE

## 2024-06-25 VITALS
BODY MASS INDEX: 27.75 KG/M2 | DIASTOLIC BLOOD PRESSURE: 78 MMHG | OXYGEN SATURATION: 94 % | SYSTOLIC BLOOD PRESSURE: 133 MMHG | WEIGHT: 147 LBS | RESPIRATION RATE: 16 BRPM | HEART RATE: 87 BPM | HEIGHT: 61 IN | TEMPERATURE: 97.7 F

## 2024-06-25 DIAGNOSIS — M48.062 LUMBAR STENOSIS WITH NEUROGENIC CLAUDICATION: ICD-10-CM

## 2024-06-25 DIAGNOSIS — M54.16 CHRONIC LUMBAR RADICULOPATHY: ICD-10-CM

## 2024-06-25 DIAGNOSIS — R52 PAIN: ICD-10-CM

## 2024-06-25 DIAGNOSIS — M54.16 ACUTE LUMBAR RADICULOPATHY: ICD-10-CM

## 2024-06-25 PROCEDURE — 2500000004 HC RX 250 GENERAL PHARMACY W/ HCPCS (ALT 636 FOR OP/ED): Performed by: ANESTHESIOLOGY

## 2024-06-25 PROCEDURE — 64484 NJX AA&/STRD TFRM EPI L/S EA: CPT | Performed by: ANESTHESIOLOGY

## 2024-06-25 PROCEDURE — 2500000001 HC RX 250 WO HCPCS SELF ADMINISTERED DRUGS (ALT 637 FOR MEDICARE OP)

## 2024-06-25 PROCEDURE — 2550000001 HC RX 255 CONTRASTS: Performed by: ANESTHESIOLOGY

## 2024-06-25 PROCEDURE — 64483 NJX AA&/STRD TFRM EPI L/S 1: CPT | Performed by: ANESTHESIOLOGY

## 2024-06-25 PROCEDURE — 2500000004 HC RX 250 GENERAL PHARMACY W/ HCPCS (ALT 636 FOR OP/ED): Performed by: NURSE PRACTITIONER

## 2024-06-25 PROCEDURE — 99152 MOD SED SAME PHYS/QHP 5/>YRS: CPT | Performed by: ANESTHESIOLOGY

## 2024-06-25 RX ORDER — MIDAZOLAM HYDROCHLORIDE 1 MG/ML
INJECTION, SOLUTION INTRAMUSCULAR; INTRAVENOUS AS NEEDED
Status: COMPLETED | OUTPATIENT
Start: 2024-06-25 | End: 2024-06-25

## 2024-06-25 RX ORDER — CIPROFLOXACIN 500 MG/1
500 TABLET ORAL ONCE
Status: DISCONTINUED | OUTPATIENT
Start: 2024-06-25 | End: 2024-06-26 | Stop reason: HOSPADM

## 2024-06-25 RX ORDER — BUPIVACAINE HYDROCHLORIDE 2.5 MG/ML
INJECTION, SOLUTION EPIDURAL; INFILTRATION; INTRACAUDAL AS NEEDED
Status: COMPLETED | OUTPATIENT
Start: 2024-06-25 | End: 2024-06-25

## 2024-06-25 RX ORDER — SODIUM CHLORIDE, SODIUM LACTATE, POTASSIUM CHLORIDE, CALCIUM CHLORIDE 600; 310; 30; 20 MG/100ML; MG/100ML; MG/100ML; MG/100ML
100 INJECTION, SOLUTION INTRAVENOUS CONTINUOUS
Status: DISCONTINUED | OUTPATIENT
Start: 2024-06-25 | End: 2024-06-26 | Stop reason: HOSPADM

## 2024-06-25 RX ORDER — FENTANYL CITRATE 50 UG/ML
INJECTION, SOLUTION INTRAMUSCULAR; INTRAVENOUS AS NEEDED
Status: COMPLETED | OUTPATIENT
Start: 2024-06-25 | End: 2024-06-25

## 2024-06-25 RX ORDER — BETAMETHASONE SODIUM PHOSPHATE AND BETAMETHASONE ACETATE 3; 3 MG/ML; MG/ML
INJECTION, SUSPENSION INTRA-ARTICULAR; INTRALESIONAL; INTRAMUSCULAR; SOFT TISSUE AS NEEDED
Status: COMPLETED | OUTPATIENT
Start: 2024-06-25 | End: 2024-06-25

## 2024-06-25 RX ORDER — CIPROFLOXACIN 500 MG/1
TABLET ORAL
Status: COMPLETED
Start: 2024-06-25 | End: 2024-06-25

## 2024-06-25 ASSESSMENT — PAIN - FUNCTIONAL ASSESSMENT
PAIN_FUNCTIONAL_ASSESSMENT: 0-10

## 2024-06-25 ASSESSMENT — PAIN SCALES - GENERAL
PAINLEVEL_OUTOF10: 0 - NO PAIN

## 2024-06-25 ASSESSMENT — COLUMBIA-SUICIDE SEVERITY RATING SCALE - C-SSRS
6. HAVE YOU EVER DONE ANYTHING, STARTED TO DO ANYTHING, OR PREPARED TO DO ANYTHING TO END YOUR LIFE?: NO
2. HAVE YOU ACTUALLY HAD ANY THOUGHTS OF KILLING YOURSELF?: NO
1. IN THE PAST MONTH, HAVE YOU WISHED YOU WERE DEAD OR WISHED YOU COULD GO TO SLEEP AND NOT WAKE UP?: NO

## 2024-06-25 NOTE — DISCHARGE INSTRUCTIONS
No heavy lifting or strenuous activity today.  Do not sign important documents.  Do not drive for 24 hours.  Keep bandage on for 24 hours.  Keep injection site clean and dry, it may be sore for up to 48 hours.  Do not smoke or drink alcohol for 24 hours.   For relief of pain and swelling, you may apply ice to the injection site area.  If pain persist you may apply moist heat.  Common side effects of steroids include insomnia, facial flushing, increased appetite, headaches, sweating, fluid retention. If you have diabetes your blood sugar may increase. Please monitor your diet and your blood sugar should return to normal in a few days. If your sugar becomes dangerously high, please contact your physician that manages your diabetes for further guidance.  Rare side effects include infection, bleeding, nerve damage. If you have fever, redness or swelling near site, severe pain, please go to the nearest emergency room. For other questions please call office at [phone]. Local anesthetics wear off in several hours and duration of relief vary from person to person.      Follow up with Stephanie Leyva in [place] on 8/9 @ 9:00 am

## 2024-06-25 NOTE — NURSING NOTE
Patient able to stand and take a few steps with no issues, no complaints of numbness or tingling to lower extremities at this time.

## 2024-06-28 ENCOUNTER — APPOINTMENT (OUTPATIENT)
Dept: PREADMISSION TESTING | Facility: HOSPITAL | Age: 69
End: 2024-06-28
Payer: MEDICARE

## 2024-07-05 ENCOUNTER — APPOINTMENT (OUTPATIENT)
Dept: PREADMISSION TESTING | Facility: HOSPITAL | Age: 69
End: 2024-07-05
Payer: MEDICARE

## 2024-07-23 ENCOUNTER — APPOINTMENT (OUTPATIENT)
Dept: PRIMARY CARE | Facility: CLINIC | Age: 69
End: 2024-07-23
Payer: MEDICARE

## 2024-07-23 VITALS
TEMPERATURE: 97 F | WEIGHT: 146.8 LBS | HEIGHT: 61 IN | OXYGEN SATURATION: 98 % | BODY MASS INDEX: 27.72 KG/M2 | HEART RATE: 95 BPM | DIASTOLIC BLOOD PRESSURE: 84 MMHG | SYSTOLIC BLOOD PRESSURE: 124 MMHG

## 2024-07-23 DIAGNOSIS — Z12.31 ENCOUNTER FOR SCREENING MAMMOGRAM FOR MALIGNANT NEOPLASM OF BREAST: ICD-10-CM

## 2024-07-23 DIAGNOSIS — M48.061 LUMBAR FORAMINAL STENOSIS: ICD-10-CM

## 2024-07-23 DIAGNOSIS — E78.5 DYSLIPIDEMIA: Primary | ICD-10-CM

## 2024-07-23 PROCEDURE — 3008F BODY MASS INDEX DOCD: CPT | Performed by: FAMILY MEDICINE

## 2024-07-23 PROCEDURE — 1159F MED LIST DOCD IN RCRD: CPT | Performed by: FAMILY MEDICINE

## 2024-07-23 PROCEDURE — 1157F ADVNC CARE PLAN IN RCRD: CPT | Performed by: FAMILY MEDICINE

## 2024-07-23 PROCEDURE — 99214 OFFICE O/P EST MOD 30 MIN: CPT | Performed by: FAMILY MEDICINE

## 2024-07-23 RX ORDER — GABAPENTIN 300 MG/1
300 CAPSULE ORAL NIGHTLY
Qty: 90 CAPSULE | Refills: 1 | Status: SHIPPED | OUTPATIENT
Start: 2024-07-23

## 2024-07-23 ASSESSMENT — PATIENT HEALTH QUESTIONNAIRE - PHQ9
1. LITTLE INTEREST OR PLEASURE IN DOING THINGS: NOT AT ALL
SUM OF ALL RESPONSES TO PHQ9 QUESTIONS 1 AND 2: 0
2. FEELING DOWN, DEPRESSED OR HOPELESS: NOT AT ALL

## 2024-07-23 ASSESSMENT — ENCOUNTER SYMPTOMS
OCCASIONAL FEELINGS OF UNSTEADINESS: 0
LOSS OF SENSATION IN FEET: 0
DEPRESSION: 0

## 2024-07-23 NOTE — PROGRESS NOTES
"Subjective   Patient ID: Janelle Pitt is a 68 y.o. female who presents for Follow-up (Pt states no concerns today, still having trouble with drop foot).    HPI  Here for follow up   Still having drop foot, following with neurosurgeon, going to go for lumbar laminectomy and decompression surgery September. Is also following with pain management. On gabapentin 100mg TID, but can't tolerate during the day so has been taking all 3 capsules at night, will change to 300mg capsule to take at bedtime.     Review of Systems  General: no fever  Eyes: no blurry vision  ENT: no sore throat, no ear pain  Resp: no cough, sob or wheezing  Cardio: no chest pain, no palpitations  Abd: no nausea/vomiting  : no dysuria, no increased urinary frequency      /84   Pulse 95   Temp 36.1 °C (97 °F)   Ht 1.549 m (5' 1\")   Wt 66.6 kg (146 lb 12.8 oz)   SpO2 98%   BMI 27.74 kg/m²       Objective   Physical Exam  Gen: NAD, alert  Head: normocephalic/atraumatic  Eyes: conjunctivae normal  Ears: canals clear bilaterally, TM normal   Nose: external nose normal   Oropharynx: clear   Resp: Clear to auscultation  CVS: Regular rate and rhythm  Abdomen: soft, NT, ND  Ext: no edema, NT of lower extremities       Assessment/Plan   Problem List Items Addressed This Visit       Dyslipidemia - Primary    Relevant Orders    Lipid Panel    CBC    Comprehensive Metabolic Panel    TSH with reflex to Free T4 if abnormal    Lumbar foraminal stenosis    Relevant Medications    gabapentin (Neurontin) 300 mg capsule     Other Visit Diagnoses       Encounter for screening mammogram for malignant neoplasm of breast        Relevant Orders    BI mammo bilateral screening tomosynthesis               "

## 2024-07-31 ENCOUNTER — APPOINTMENT (OUTPATIENT)
Dept: NEUROSURGERY | Facility: HOSPITAL | Age: 69
End: 2024-07-31
Payer: MEDICARE

## 2024-08-09 ENCOUNTER — LAB (OUTPATIENT)
Dept: LAB | Facility: LAB | Age: 69
End: 2024-08-09
Payer: MEDICARE

## 2024-08-09 ENCOUNTER — OFFICE VISIT (OUTPATIENT)
Dept: PAIN MEDICINE | Facility: HOSPITAL | Age: 69
End: 2024-08-09
Payer: MEDICARE

## 2024-08-09 VITALS
RESPIRATION RATE: 16 BRPM | DIASTOLIC BLOOD PRESSURE: 90 MMHG | WEIGHT: 146 LBS | BODY MASS INDEX: 27.56 KG/M2 | HEART RATE: 89 BPM | SYSTOLIC BLOOD PRESSURE: 134 MMHG | OXYGEN SATURATION: 96 % | HEIGHT: 61 IN | TEMPERATURE: 97.7 F

## 2024-08-09 DIAGNOSIS — M54.16 CHRONIC LUMBAR RADICULOPATHY: Primary | ICD-10-CM

## 2024-08-09 DIAGNOSIS — E78.5 DYSLIPIDEMIA: ICD-10-CM

## 2024-08-09 DIAGNOSIS — M48.062 LUMBAR STENOSIS WITH NEUROGENIC CLAUDICATION: ICD-10-CM

## 2024-08-09 LAB
ALBUMIN SERPL BCP-MCNC: 3.9 G/DL (ref 3.4–5)
ALP SERPL-CCNC: 70 U/L (ref 33–136)
ALT SERPL W P-5'-P-CCNC: 12 U/L (ref 7–45)
ANION GAP SERPL CALC-SCNC: 15 MMOL/L (ref 10–20)
AST SERPL W P-5'-P-CCNC: 19 U/L (ref 9–39)
BILIRUB SERPL-MCNC: 0.7 MG/DL (ref 0–1.2)
BUN SERPL-MCNC: 10 MG/DL (ref 6–23)
CALCIUM SERPL-MCNC: 9.5 MG/DL (ref 8.6–10.3)
CHLORIDE SERPL-SCNC: 104 MMOL/L (ref 98–107)
CHOLEST SERPL-MCNC: 209 MG/DL (ref 0–199)
CHOLESTEROL/HDL RATIO: 2.6
CO2 SERPL-SCNC: 26 MMOL/L (ref 21–32)
CREAT SERPL-MCNC: 0.77 MG/DL (ref 0.5–1.05)
EGFRCR SERPLBLD CKD-EPI 2021: 84 ML/MIN/1.73M*2
ERYTHROCYTE [DISTWIDTH] IN BLOOD BY AUTOMATED COUNT: 14.6 % (ref 11.5–14.5)
GLUCOSE SERPL-MCNC: 104 MG/DL (ref 74–99)
HCT VFR BLD AUTO: 43.5 % (ref 36–46)
HDLC SERPL-MCNC: 80.4 MG/DL
HGB BLD-MCNC: 14.4 G/DL (ref 12–16)
LDLC SERPL CALC-MCNC: 106 MG/DL
MCH RBC QN AUTO: 31.2 PG (ref 26–34)
MCHC RBC AUTO-ENTMCNC: 33.1 G/DL (ref 32–36)
MCV RBC AUTO: 94 FL (ref 80–100)
NON HDL CHOLESTEROL: 129 MG/DL (ref 0–149)
NRBC BLD-RTO: 0 /100 WBCS (ref 0–0)
PLATELET # BLD AUTO: 305 X10*3/UL (ref 150–450)
POTASSIUM SERPL-SCNC: 3.6 MMOL/L (ref 3.5–5.3)
PROT SERPL-MCNC: 6.8 G/DL (ref 6.4–8.2)
RBC # BLD AUTO: 4.61 X10*6/UL (ref 4–5.2)
SODIUM SERPL-SCNC: 141 MMOL/L (ref 136–145)
TRIGL SERPL-MCNC: 113 MG/DL (ref 0–149)
TSH SERPL-ACNC: 1.65 MIU/L (ref 0.44–3.98)
VLDL: 23 MG/DL (ref 0–40)
WBC # BLD AUTO: 5.1 X10*3/UL (ref 4.4–11.3)

## 2024-08-09 PROCEDURE — 99213 OFFICE O/P EST LOW 20 MIN: CPT | Performed by: NURSE PRACTITIONER

## 2024-08-09 PROCEDURE — 36415 COLL VENOUS BLD VENIPUNCTURE: CPT

## 2024-08-09 PROCEDURE — 84443 ASSAY THYROID STIM HORMONE: CPT

## 2024-08-09 PROCEDURE — 80053 COMPREHEN METABOLIC PANEL: CPT

## 2024-08-09 PROCEDURE — 85027 COMPLETE CBC AUTOMATED: CPT

## 2024-08-09 PROCEDURE — 80061 LIPID PANEL: CPT

## 2024-08-09 RX ORDER — GABAPENTIN 300 MG/1
600 CAPSULE ORAL NIGHTLY
Qty: 60 CAPSULE | Refills: 2 | Status: SHIPPED | OUTPATIENT
Start: 2024-08-09

## 2024-08-09 ASSESSMENT — ENCOUNTER SYMPTOMS
RESPIRATORY NEGATIVE: 1
ENDOCRINE NEGATIVE: 1
BACK PAIN: 1
GASTROINTESTINAL NEGATIVE: 1
NECK PAIN: 0
CARDIOVASCULAR NEGATIVE: 1
MYALGIAS: 1
ARTHRALGIAS: 1
NEUROLOGICAL NEGATIVE: 1
CONSTITUTIONAL NEGATIVE: 1
NECK STIFFNESS: 0
PSYCHIATRIC NEGATIVE: 1
ALLERGIC/IMMUNOLOGIC NEGATIVE: 1
EYES NEGATIVE: 1
JOINT SWELLING: 0

## 2024-08-09 ASSESSMENT — PAIN SCALES - GENERAL: PAINLEVEL: 3

## 2024-08-09 NOTE — PROGRESS NOTES
History Of Present Illness  Janelle Pitt is a pleasant 68 y.o. female who is here for for postprocedure follow-up.  Patient is ambulatory with the use of a wooden staff.  Gait is steady.  She arrives with her spouse.    Patient had left L4-L5 and L5-S1 TFESI on 6/25/2024.  The injection has improved her leg pain.  It provided between 70 to 80% relief that lasted for more than 3 weeks.  She reports it is slowly wearing off and is now down to 20 to 30%.  The injection has improved her pain, ability to participate in her daily activities and ability to enjoy social activities.  Patient also reports that she has more mobility.    Patient continues to have chronic pain and is now concentrated at the left foot.  She rates her pain as 2-3 out of 10 with rest and 6-7 out of 10 with prolonged standing and walking.  Pain is constant.  She describes it as sharp.  She has pain, numbness and tingling sensation more so at the left foot.  She reports it is the whole foot.  There has been a significant improvement since the last injection.  She mentioned that she is able to move her big toe but the rest of her toes are the same.  Patient denies pain, numbness or tingling sensation to the right leg or her foot.  From last exam, patient was not able to raise her left leg more than 30 degrees without aggravating pain.  Patient denies leg weakness or change in balance.  She denies bowel or bladder incontinence.  She denies recent falls, injuries, or ER visits.  There has been no changes since she was last seen.    Patient continues to take medications prescribed by her PCP.  She takes Flexeril at night.  She takes gabapentin 300 mg at night.  She denies side effects to her medications.  I discussed increasing her gabapentin and she is in agreement.  Patient also has an appointment with spine surgeon in September.  She is hoping not to have surgery if the injections worked.    I reviewed previous notes and imaging.  MRI of the lumbar  spine that was done 5/10/2024 shows moderate to severe bilateral neuroforaminal stenosis at L4-L5 and L5-S1.  I discussed the plan of care including pharmacologic and joint interventional procedure.  Patient would like the same procedure repeated to get relief to her foot.  This is done under fluoroscopy.  She would like it with sedation due to the pain.  Questions were answered during this encounter.    -------------  PROCEDURES:    6/25/2024: Left L4-L5 and L5-S1 TFESI  ----------         OARRS:  Stephanie Leyva, APRN-CNP, DNP on 8/9/2024  9:47 AM  I have personally reviewed the OARRS report for Janelle Pitt. 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    Surgical History  Past Surgical History:   Procedure Laterality Date    TOTAL HIP ARTHROPLASTY Right 12/12/2022        Social History  She reports that she has never smoked. She has never used smokeless tobacco. She reports current alcohol use. She reports that she does not use drugs.    Family History  Family History   Problem Relation Name Age of Onset    Alzheimer's disease Mother          Allergies  Patient has no known allergies.    Medications    Current Outpatient Medications:     cyclobenzaprine (Flexeril) 5 mg tablet, TAKE 1 TABLETS AT BEDTIME AS NEEDED FOR MUSCLE PAIN. DO NOT DRIVE OR OPERATE HEAVY MACHINERY WHILE TAKING, Disp: 90 tablet, Rfl: 1    gabapentin (Neurontin) 300 mg capsule, Take 2 capsules (600 mg) by mouth once daily at bedtime., Disp: 60 capsule, Rfl: 2     Review of Systems   Constitutional: Negative.    HENT: Negative.     Eyes: Negative.    Respiratory: Negative.     Cardiovascular: Negative.     Gastrointestinal: Negative.    Endocrine: Negative.    Genitourinary: Negative.    Musculoskeletal:  Positive for arthralgias, back pain and myalgias. Negative for gait problem, joint swelling, neck pain and neck stiffness.   Skin: Negative.    Allergic/Immunologic: Negative.    Neurological: Negative.    Psychiatric/Behavioral: Negative.          Physical Exam  Vitals and nursing note reviewed.   HENT:      Head: Normocephalic.      Nose: Nose normal.   Eyes:      Extraocular Movements: Extraocular movements intact.      Conjunctiva/sclera: Conjunctivae normal.      Pupils: Pupils are equal, round, and reactive to light.   Cardiovascular:      Rate and Rhythm: Normal rate and regular rhythm.   Pulmonary:      Effort: Pulmonary effort is normal.      Breath sounds: Normal breath sounds.   Musculoskeletal:         General: Tenderness present. No swelling, deformity or signs of injury.      Cervical back: No rigidity or tenderness.      Lumbar back: Tenderness present.      Right lower leg: No edema.      Left lower leg: No edema.      Comments: Negative leg raise.  Able to raise both legs.  Previously, she was unable to raise her left leg more than 30 degree.  Negative for paraspinal tenderness at the lumbar region.  With nonspecific left-sided radicular symptoms affecting her whole left foot.  BUE 4-5/5, RLE 4-5/5, LLE 4/5.  There is also improvement with her left leg strength.   Skin:     General: Skin is warm and dry.   Neurological:      General: No focal deficit present.      Mental Status: She is alert and oriented to person, place, and time.   Psychiatric:         Mood and Affect: Mood normal.         Behavior: Behavior normal.          Last Recorded Vitals  /90 (BP Location: Right arm, Patient Position: Sitting, BP Cuff Size: Adult)   Pulse 89   Temp 36.5 °C (97.7 °F) (Temporal)   Resp 16   Wt 66.2 kg (146 lb)   SpO2 96%       Pain Management Panel  More data may exist         Latest Ref Rng &  Units 6/11/2024 8/10/2022   Pain Management Panel   Amphetamine Screen, Urine Presumptive Negative Presumptive Negative  PRESUMPTIVE NEGATIVE    Barbiturate Screen, Urine Presumptive Negative Presumptive Negative  PRESUMPTIVE NEGATIVE    Codeine IgE <50 ng/mL <50  <50    Hydromorphone Urine <25 ng/mL <25  <25    Morphine  <50 ng/mL <50  <50       Details                      Relevant Results    Narrative & Impression   Interpreted By:  Eliecer Cui,  and Daly Fine   STUDY:  MR LUMBAR SPINE W AND WO IV CONTRAST;  5/10/2024 3:23 pm      INDICATION:  Signs/Symptoms:pain in low back pain, getting worse, and having  significant difficulty walking and having foot drop which is  worsening.      COMPARISON:  None.      ACCESSION NUMBER(S):  [id number]      ORDERING CLINICIAN:  AZALEA WARD      TECHNIQUE:  Sagittal T1, T2, STIR, axial T1 and T2 weighted images of the lumbar  spine were acquired.      FINDINGS:  Alignment:  images demonstrate mild dextroscoliosis. There is  straightening of the normal lumbar lordosis which may be secondary to  patient's positioning versus muscle spasm.      Vertebrae/Intervertebral Discs: The vertebral bodies demonstrate  expected height. Heterogeneous marrow signal is within normal limits.  There is multilevel endplate degenerative changes with disc  desiccation and loss disc height with superimposed areas of endplate  marrow reactive changes most pronounced at T11-T12, L2-L3, L4-L5 and  L5-S1. Mild increased STIR hyperintense signal along the anterior  aspect of the L5 vertebral body, likely represents reactive changes.      Conus medullaris: The lower thoracic cord appears unremarkable. The  conus medullaris terminates at L1-L2.      T10-T11: Mild posterior disc bulge resulting in effacement of the  ventral thecal sac. There is mild central canal stenosis. Moderate  bilateral neural foraminal stenosis.      T11-T12: Mild posterior disc bulge effacing the ventral thecal  sac  with mild central canal stenosis. Moderate right and mild left neural  foraminal stenosis.      T12-L1: Diffuse posterior disc osteophytic complex and mild  ligamentum flavum hypertrophy resulting in mild central canal  stenosis. Mild bilateral neural foraminal stenosis.      L1-2: Diffuse posterior disc osteophytic complex and mild facet  osteoarthrosis resulting in mild central canal stenosis. There is  narrowing of the left subarticular recess. Moderate left and mild  right neural foraminal stenosis.      L2-3: Diffuse posterior disc osteophytic complex, epidural fatty  hypertrophy and mild facet osteoarthrosis results in moderate central  canal stenosis. There is narrowing of the bilateral subarticular  recess. There is moderate bilateral neural foraminal stenosis.      L3-4: Diffuse posterior disc osteophytic complex, epidural fatty  hypertrophy and facet osteoarthrosis results in severe central canal  stenosis. Moderate bilateral neural foraminal stenosis.      L4-5: Diffuse posterior disc osteophytic complex, facet  osteoarthrosis and epidural fatty hypertrophy results in severe  central canal stenosis. There is moderate to severe bilateral neural  foraminal stenosis.      L5-S1: Diffuse posterior disc osteophytic complex, facet  osteoarthrosis and ligamentum flavum hypertrophy without significant  central canal stenosis. Moderate to severe bilateral neural foraminal  stenosis.      The prevertebral and posterior paraspinous soft tissues are  unremarkable.      IMPRESSION:  1. Multilevel degenerative changes of the lumbar spine with  high-grade central canal and neural foraminal stenosis most  pronounced at L3-L4 and L4-L5 with likely compression of traversing  nerve roots in these regions. Please correlate clinically.               Media Information          Assessment/Plan   Problem List Items Addressed This Visit             ICD-10-CM    Chronic lumbar radiculopathy - Primary M54.16    Relevant  Medications    gabapentin (Neurontin) 300 mg capsule    Other Relevant Orders    FL pain management    Transforaminal    Lumbar stenosis with neurogenic claudication M48.062    Relevant Medications    gabapentin (Neurontin) 300 mg capsule    Other Relevant Orders    FL pain management    Transforaminal              1. Chronic lumbar radiculopathy  - gabapentin (Neurontin) 300 mg capsule; Take 2 capsules (600 mg) by mouth once daily at bedtime.  Dispense: 60 capsule; Refill: 2  - [place] pain management; Future  - Transforaminal; Future    2. Lumbar stenosis with neurogenic claudication  - gabapentin (Neurontin) 300 mg capsule; Take 2 capsules (600 mg) by mouth once daily at bedtime.  Dispense: 60 capsule; Refill: 2  - [place] pain management; Future  - Transforaminal; Future       Plan/Follow-up Instructions:   Continue taking Flexeril prescribed by your PCP as needed for muscle pain relief.  Do not take sedating medications together.    I increase your gabapentin and you may take 600 mg at night.  Do not take sedating medications together.    ---------------    Your next appointment is with Dr. Schrader in:    [facility]    For pain block/injection on: 8/26/2024, left L4-L5 and L5-S1 transforaminal epidural steroid injection #2    SEDATION: You must NOT eat or drink 6 hours before the procedure and you must have a  with you to take you home if planning to have a sedation with your block.    °You will receive a phone call the weekday before your appointment/procedure.   °Please KEEP your scheduled appointments.     °BRING your photo ID, insurance information, and a correct list of your home medications to EVERY visit.    °Please call our office at [phone] if you have any questions.     You will then be seen for a postprocedure follow-up in 6 to 8 weeks.    Antibiotic: You will receive Cipro before the procedure due to your history of right total hip replacement    ---------------        Time      Prep time on date of the patient encounter: 2  Time spent directly with patient/family/caregiver: 25  Documentation time: 2  Total time on date of patient encounter: 29      JEREMY Styles-CNP, LANNY Leyva DNP, JEREMY, LOGAN    [facility]  Office #: [phone]  Fax # [phone]    ---------------------  Disclaimer: This note was created using voice recognition software. It was not corrected for typographical or grammatical errors, inadvertent word insertion, or any unintended errors. Please feel free to contact me for clarification.  -----------------    Addendum 8/22/2024:  Patient's procedure for left L4-L5 and L5-S1 TFESI on 8/26/2024 was denied.  Peer to peer was scheduled for today.  I spoke to Dr. Marin from [facility].  I was informed that patient would need 50% relief for 3 months duration for her procedure to be approved.  Her last procedure of left L4-L5 and L5-S1 TFESI was on 6/25/2024.  Patient's procedure was denied.  I canceled her procedure for 8/26/2024.    Patient will be made aware.    Stephanie Leyva DNP, JEREMY, LOGAN    [facility]  Office #: [phone]  Fax # [phone]

## 2024-08-09 NOTE — PATIENT INSTRUCTIONS
Continue taking Flexeril prescribed by your PCP as needed for muscle pain relief.  Do not take sedating medications together.    I increase your gabapentin and you may take 600 mg at night.  Do not take sedating medications together.    ---------------    Your next appointment is with Dr. Schrader in:    [facility]    For pain block/injection on: 8/26/2024, left L4-L5 and L5-S1 transforaminal epidural steroid injection #2    SEDATION: You must NOT eat or drink 6 hours before the procedure and you must have a  with you to take you home if planning to have a sedation with your block.    °You will receive a phone call the weekday before your appointment/procedure.   °Please KEEP your scheduled appointments.     °BRING your photo ID, insurance information, and a correct list of your home medications to EVERY visit.    °Please call our office at [phone] if you have any questions.     You will then be seen for a postprocedure follow-up in 6 to 8 weeks.    Antibiotic: You will receive Cipro before the procedure due to your history of right total hip replacement    ---------------

## 2024-08-09 NOTE — PROGRESS NOTES
Last urine drug screening date/ordered today: 06/11/2024     Results of last screen: as expected      Last opioid risk screening date/ordered today: 06/11/2024      Pain Scale Screening:   Pain Assessment and Documentation Tool (PADT)   Date of Assessment: 08/09/2024  Analgesia:   Patient reports her pain level on average during the past week is 1on a 0 - 10 scale.   Patient reports that her pain level at its worst during the past week was 3 on a 0 -10 scale.   25% of pain has been relieved during the past week per patient   Patient states that the amount of pain relief she is now obtaining from her current pain reliever(s) is enough to make a real difference in her life.   Query to clinician: Is the patient's pain relief clinically significant? yes  Activities of Daily Living:   Physical functioning: unchanged  Family relationships: unchanged  Social relationships: unchanged  Mood: unchanged  Sleep patterns: unchanged  Overall functioning: unchanged  Adverse Events: No, Janelle Pitt is not experiencing side effects from current pain reliever.  Patients overall severity of side effect:none

## 2024-08-12 ENCOUNTER — DOCUMENTATION (OUTPATIENT)
Dept: PHYSICAL THERAPY | Facility: HOSPITAL | Age: 69
End: 2024-08-12
Payer: MEDICARE
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
## 2024-08-19 ENCOUNTER — TELEPHONE (OUTPATIENT)
Dept: PAIN MEDICINE | Facility: HOSPITAL | Age: 69
End: 2024-08-19
Payer: MEDICARE

## 2024-08-20 ENCOUNTER — APPOINTMENT (OUTPATIENT)
Dept: NEUROSURGERY | Facility: CLINIC | Age: 69
End: 2024-08-20
Payer: MEDICARE

## 2024-08-22 RX ORDER — SODIUM CHLORIDE, SODIUM LACTATE, POTASSIUM CHLORIDE, CALCIUM CHLORIDE 600; 310; 30; 20 MG/100ML; MG/100ML; MG/100ML; MG/100ML
100 INJECTION, SOLUTION INTRAVENOUS CONTINUOUS
OUTPATIENT
Start: 2024-08-26

## 2024-08-22 RX ORDER — CIPROFLOXACIN 500 MG/1
500 TABLET ORAL ONCE
OUTPATIENT
Start: 2024-08-26

## 2024-08-26 ENCOUNTER — TELEPHONE (OUTPATIENT)
Dept: PAIN MEDICINE | Facility: HOSPITAL | Age: 69
End: 2024-08-26
Payer: MEDICARE

## 2024-08-26 ENCOUNTER — APPOINTMENT (OUTPATIENT)
Dept: PAIN MEDICINE | Facility: HOSPITAL | Age: 69
End: 2024-08-26
Payer: MEDICARE

## 2024-08-26 NOTE — TELEPHONE ENCOUNTER
----- Message from Stephanie Leyva sent at 8/22/2024  4:18 PM EDT -----  Please create an encounter.    Please call patient let her know that her insurance denied her procedure.  They require 50% relief for 3 months from her last injection which was on 6/25/2024.    I canceled her procedure on 8/26/2024.  She will need to be seen for an office visit if she wants.

## 2024-08-26 NOTE — TELEPHONE ENCOUNTER
A message was left for the pt letting her know info about procedure and guidelines per Stephanie.     Chana Jha MA

## 2024-08-29 ENCOUNTER — HOSPITAL ENCOUNTER (OUTPATIENT)
Dept: RADIOLOGY | Facility: HOSPITAL | Age: 69
Discharge: HOME | End: 2024-08-29
Payer: MEDICARE

## 2024-08-29 VITALS — WEIGHT: 145 LBS | HEIGHT: 61 IN | BODY MASS INDEX: 27.38 KG/M2

## 2024-08-29 DIAGNOSIS — Z12.31 ENCOUNTER FOR SCREENING MAMMOGRAM FOR MALIGNANT NEOPLASM OF BREAST: ICD-10-CM

## 2024-08-29 PROCEDURE — 77067 SCR MAMMO BI INCL CAD: CPT | Performed by: RADIOLOGY

## 2024-08-29 PROCEDURE — 77063 BREAST TOMOSYNTHESIS BI: CPT | Performed by: RADIOLOGY

## 2024-08-29 PROCEDURE — 77067 SCR MAMMO BI INCL CAD: CPT

## 2024-08-30 ENCOUNTER — CLINICAL SUPPORT (OUTPATIENT)
Dept: PREADMISSION TESTING | Facility: HOSPITAL | Age: 69
End: 2024-08-30
Payer: MEDICARE

## 2024-08-30 NOTE — CPM/PAT NURSE NOTE
CPM/PAT Nurse Note      Name: Janelle Pitt (Janelle Pitt)  /Age: 1955/68 y.o.       Past Medical History:   Diagnosis Date    Arthritis     Diverticulosis of large intestine without hemorrhage 2023    Dyslipidemia     Hyperplastic colon polyp 2023    Left foot drop     Low back pain     Osteoarthritis of right hip 2023    Osteoporosis     Peripheral arterial disease (CMS-HCC)     Peripheral neuropathy     Sacroiliac joint dysfunction of right side 2023    Spinal stenosis        Past Surgical History:   Procedure Laterality Date    COLONOSCOPY      TOTAL HIP ARTHROPLASTY Right 2022       Patient  has no history on file for sexual activity.    Family History   Problem Relation Name Age of Onset    Alzheimer's disease Mother         No Known Allergies    Prior to Admission medications    Medication Sig Start Date End Date Taking? Authorizing Provider   cyclobenzaprine (Flexeril) 5 mg tablet TAKE 1 TABLETS AT BEDTIME AS NEEDED FOR MUSCLE PAIN. DO NOT DRIVE OR OPERATE HEAVY MACHINERY WHILE TAKING 3/5/24   Kar Medina MD   gabapentin (Neurontin) 300 mg capsule Take 2 capsules (600 mg) by mouth once daily at bedtime. 24   Stephanie Leyva, APRN-CNP, DNP        PAT ROS     DASI Risk Score    No data to display       Caprini DVT Assessment    No data to display       Modified Frailty Index    No data to display       CHADS2 Stroke Risk  Current as of 11 minutes ago        N/A 3 to 100%: High Risk   2 to < 3%: Medium Risk   0 to < 2%: Low Risk     Last Change: N/A          This score determines the patient's risk of having a stroke if the patient has atrial fibrillation.        This score is not applicable to this patient. Components are not calculated.          Revised Cardiac Risk Index    No data to display       Apfel Simplified Score    No data to display       Risk Analysis Index Results This Encounter    No data found in the last 1 encounters.     Scheduled for L1-L2  3 L3-4 L4-5 laminectomy and facetectomy for decompression L5-S1 laminectomy and facetectomy for decompression with L5-S1 transforaminal lumbar interbody fusion with cage placement and T12-S1 instrumentation with fusion using autograft and BMP and pelvic fixation. - Bilateral   With Dr. Barajas on 9/20/24.     PCP: Kar Medina MD [facility] 7/23/24  Pain Management: JEREMY Saleh- CNP [facility] 8/9/24  Neurosurgery: Jeff Barajas MD [facility] 5/21/24 seen for low back pain radiating to left leg.   Cardiology: Sachi Edouard MD [facility] 11/28/22 pre-op visit for hip replacement. No cardiac workup was indicated.  -ECG; 2/28/23  Sinus rhythm with occasional Premature ventricular complexes  Low voltage QRS  Borderline ECG    - XR SCOLIOSIS 2 VIEw; 5/21/24  IMPRESSION:  S shaped thoracolumbar scoliosis as described above.  Straightening of normal thoracic and normal lumbar curvature.  Advanced lumbar spine degenerative changes from L2-3 to L5-S1.    - MR LUMBAR SPINE W AND WO IV CONTRAST; 5/10/2024   IMPRESSION:  1. Multilevel degenerative changes of the lumbar spine with  high-grade central canal and neural foraminal stenosis most  pronounced at L3-L4 and L4-L5 with likely compression of traversing  nerve roots in these regions. Please correlate clinically.    Nurse Plan of Action:    ONLY    Constance Rogers RN  Pre-Admission Testing

## 2024-09-06 ENCOUNTER — PRE-ADMISSION TESTING (OUTPATIENT)
Dept: PREADMISSION TESTING | Facility: HOSPITAL | Age: 69
End: 2024-09-06
Payer: MEDICARE

## 2024-09-06 VITALS
BODY MASS INDEX: 28.14 KG/M2 | TEMPERATURE: 98.1 F | OXYGEN SATURATION: 96 % | SYSTOLIC BLOOD PRESSURE: 134 MMHG | HEIGHT: 61 IN | DIASTOLIC BLOOD PRESSURE: 86 MMHG | HEART RATE: 98 BPM | WEIGHT: 149.03 LBS

## 2024-09-06 DIAGNOSIS — R79.1 ABNORMAL COAGULATION PROFILE: ICD-10-CM

## 2024-09-06 DIAGNOSIS — M48.061 LUMBAR FORAMINAL STENOSIS: ICD-10-CM

## 2024-09-06 DIAGNOSIS — M41.55 SCOLIOSIS OF THORACOLUMBAR REGION DUE TO DEGENERATIVE DISEASE OF SPINE IN ADULT: ICD-10-CM

## 2024-09-06 DIAGNOSIS — M48.062 SPINAL STENOSIS OF LUMBAR REGION WITH NEUROGENIC CLAUDICATION: ICD-10-CM

## 2024-09-06 DIAGNOSIS — M48.061 SPINAL STENOSIS OF LUMBAR REGION, UNSPECIFIED WHETHER NEUROGENIC CLAUDICATION PRESENT: ICD-10-CM

## 2024-09-06 DIAGNOSIS — M54.16 LUMBAR RADICULOPATHY, CHRONIC: ICD-10-CM

## 2024-09-06 LAB
ABO GROUP (TYPE) IN BLOOD: NORMAL
ANTIBODY SCREEN: NORMAL
APTT PPP: 28 SECONDS (ref 27–38)
INR PPP: 0.9 (ref 0.9–1.1)
PROTHROMBIN TIME: 10.3 SECONDS (ref 9.8–12.8)
RH FACTOR (ANTIGEN D): NORMAL

## 2024-09-06 PROCEDURE — 85610 PROTHROMBIN TIME: CPT

## 2024-09-06 PROCEDURE — 86901 BLOOD TYPING SEROLOGIC RH(D): CPT

## 2024-09-06 PROCEDURE — 87081 CULTURE SCREEN ONLY: CPT

## 2024-09-06 PROCEDURE — 86920 COMPATIBILITY TEST SPIN: CPT

## 2024-09-06 PROCEDURE — 36415 COLL VENOUS BLD VENIPUNCTURE: CPT

## 2024-09-06 PROCEDURE — 99204 OFFICE O/P NEW MOD 45 MIN: CPT | Performed by: NURSE PRACTITIONER

## 2024-09-06 RX ORDER — CHLORHEXIDINE GLUCONATE ORAL RINSE 1.2 MG/ML
SOLUTION DENTAL
Qty: 15 ML | Refills: 0 | Status: SHIPPED | OUTPATIENT
Start: 2024-09-06

## 2024-09-06 RX ORDER — CHLORHEXIDINE GLUCONATE 40 MG/ML
SOLUTION TOPICAL
Qty: 473 ML | Refills: 0 | Status: SHIPPED | OUTPATIENT
Start: 2024-09-06

## 2024-09-06 ASSESSMENT — DUKE ACTIVITY SCORE INDEX (DASI)
CAN YOU PARTICIPATE IN MODERATE RECREATIONAL ACTIVITIES LIKE GOLF, BOWLING, DANCING, DOUBLES TENNIS OR THROWING A BASEBALL OR FOOTBALL: NO
CAN YOU PARTICIPATE IN STRENOUS SPORTS LIKE SWIMMING, SINGLES TENNIS, FOOTBALL, BASKETBALL, OR SKIING: NO
CAN YOU CLIMB A FLIGHT OF STAIRS OR WALK UP A HILL: YES
CAN YOU RUN A SHORT DISTANCE: NO
CAN YOU DO HEAVY WORK AROUND THE HOUSE LIKE SCRUBBING FLOORS OR LIFTING AND MOVING HEAVY FURNITURE: YES
CAN YOU TAKE CARE OF YOURSELF (EAT, DRESS, BATHE, OR USE TOILET): YES
CAN YOU DO LIGHT WORK AROUND THE HOUSE LIKE DUSTING OR WASHING DISHES: YES
CAN YOU WALK INDOORS, SUCH AS AROUND YOUR HOUSE: YES
CAN YOU DO MODERATE WORK AROUND THE HOUSE LIKE VACUUMING, SWEEPING FLOORS OR CARRYING GROCERIES: YES
CAN YOU WALK A BLOCK OR TWO ON LEVEL GROUND: YES
CAN YOU DO YARD WORK LIKE RAKING LEAVES, WEEDING OR PUSHING A MOWER: YES

## 2024-09-06 ASSESSMENT — ENCOUNTER SYMPTOMS
MYALGIAS: 1
EYES NEGATIVE: 1
RESPIRATORY NEGATIVE: 1
ARTHRALGIAS: 1
NECK NEGATIVE: 1
CONSTITUTIONAL NEGATIVE: 1
NUMBNESS: 1
ENDOCRINE NEGATIVE: 1
GASTROINTESTINAL NEGATIVE: 1
CARDIOVASCULAR NEGATIVE: 1

## 2024-09-06 ASSESSMENT — LIFESTYLE VARIABLES: SMOKING_STATUS: NONSMOKER

## 2024-09-06 NOTE — PREPROCEDURE INSTRUCTIONS
Fasting Guidelines    NPO Instructions:    Do not eat any food after midnight the night before your surgery/procedure.  You may have up to 13.5 ounces of clear liquids until TWO hours before your instructed arrival time to the hospital. This includes water, black tea/coffee, (no milk or cream), apple juice, and/or electrolyte drinks (Gatorade).  You may chew gum up to TWO hours before your surgery/procedure.    Additional Instructions:     We have sent a prescription for Hibiclens soap and Peridex mouth wash to your preferred pharmacy.  If you have not already, Please  your prescription and start using as directed before surgery.  Follow the instruction sheet provided to you at your CPM/PAT appointment.    Avoid herbal supplements, multivitamins and NSAIDS (non-steroidal anti-inflammatory drugs) such as Advil, Aleve, Ibuprofen, Naproxen, Excedrin, Meloxicam or Celebrex for at least 7 days prior to surgery. May take Tylenol as needed.    Avoid tobacco and alcohol products for 24 hours prior to surgery.    CONTACT SURGEON'S OFFICE IF YOU DEVELOP:  * Fever = 100.4 F   * New respiratory symptoms (e.g. cough, shortness of breath, respiratory distress, sore throat)  * Recent loss of taste or smell  *Flu like symptoms such as headache, fatigue or gastrointestinal symptoms  * You develop any open sores, shingles, burning or painful urination   AND/OR:  * You no longer wish to have the surgery.  * Any other personal circumstances change that may lead to the need to cancel or defer this surgery.  *You were admitted to any hospital within one week of your planned procedure.    Seven/Six Days before Surgery:  Review your medication instructions, stop indicated medications    Day of Surgery:  Review your medication instructions, take indicated medications  Wear comfortable loose fitting clothing  Do not use moisturizers, creams, lotions or perfume  All jewelry and valuables should be left at home    Karlos Vidal  [facility]  Xzobk-[phone]  [phone]  Email-[email or web address]      Patient Information: Pre-Operative Infection Prevention Measures     Why did I have my nose, under my arms, and groin swabbed?  The purpose of the swab is to identify Staphylococcus aureus inside your nose or on your skin.  The swab was sent to the laboratory for culture.  A positive swab/culture for Staphylococcus aureus is called colonization or carriage.      What is Staphylococcus aureus?  Staphylococcus aureus, also known as “staph”, is a germ found on the skin or in the nose of healthy people.  Sometimes Staphylococcus aureus can get into the body and cause an infection.  This can be minor (such as pimples, boils, or other skin problems).  It might also be serious (such as a blood infection, pneumonia, or a surgical site infection).    What is Staphylococcus aureus colonization or carriage?  Colonization or carriage means that a person has the germ but is not sick from it.  These bacteria can be spread on the hands or when breathing or sneezing.    How is Staphylococcus aureus spread?  It is most often spread by close contact with a person or item that carries it.    What happens if my culture is positive for Staphylococcus aureus?  Your doctor/medical team will use this information to guide any antibiotic treatment which may be necessary.  Regardless of the culture results, we will clean the inside of your nose with a betadine swab just before you have your surgery.      Will I get an infection if I have Staphylococcus aureus in my nose or on my skin?  Anyone can get an infection with Staphylococcus aureus.  However, the best way to reduce your risk of infection is to follow the instructions provided to you for the use of your CHG soap and dental rinse.        Patient Information: Oral/Dental Rinse    What is oral/dental rinse?   It is a mouthwash. It is a way of cleaning the mouth with a  germ-killing solution before your surgery.  The solution contains chlorhexidine, commonly known as CHG.   It is used inside the mouth to kill a bacteria known as Staphylococcus aureus.  Let your doctor know if you are allergic to Chlorhexidine.    Why do I need to use CHG oral/dental rinse?  The CHG oral/dental rinse helps to kill a bacteria in your mouth known as Staphylococcus aureus.     This reduces the risk of infection at the surgical site.      Using your CHG oral/dental rinse  STEPS:  Use your CHG oral/dental rinse after you brush your teeth the night before (at bedtime) and the morning of your surgery.  Follow all directions on your prescription label.    Use the cap on the container to measure 15ml   Swish (gargle if you can) the mouthwash in your mouth for at least 30 seconds, (do not swallow) and spit out  After you use your CHG rinse, do not rinse your mouth with water, drink or eat.  Please refer to the prescription label for the appropriate time to resume oral intake      What side effects might I have using the CHG oral/dental rinse?  CHG rinse will stick to plaque on the teeth.  Brush and floss just before use.  Teeth brushing will help avoid staining of plaque during use.      Patient Information: Home Preoperative Antibacterial Shower      What is a home preoperative antibacterial shower?  This shower is a way of cleaning the skin with a germ-killing solution before surgery.  The solution contains chlorhexidine, commonly known as CHG.  CHG is a skin cleanser with germ-killing ability.  Let your doctor know if you are allergic to chlorhexidine.    Why do I need to take a preoperative antibacterial shower?  Skin is not sterile.  It is best to try to make your skin as free of germs as possible before surgery.  Proper cleansing with a germ-killing soap before surgery can lower the number of germs on your skin.  This helps to reduce the risk of infection at the surgical site.  Following the  instructions listed below will help you prepare your skin for surgery.      How do I use the solution?  Steps:  Begin using your CHG soap 5 days before your scheduled surgery on ________________________.    First, wash and rinse your hair using the CHG soap. Keep CHG soap away from ear canals and eyes.  Rinse completely, do not condition.  Hair extensions should be removed.  Wash your face with your normal soap and rinse.    Apply the CHG solution to a clean wet washcloth.  Turn the water off or move away from the water spray to avoid premature rinsing of the CHG soap as you are applying.   Firmly lather your entire body from the neck down.  Do not use on your face.  Pay special attention to the area(s) where your incision(s) will be located unless they are on your face.  Avoid scrubbing your skin too hard.  The important point is to have the CHG soap sit on your skin for 3 minutes.    When the 3 minutes are up, turn on the water and rinse the CHG solution off your body completely.   DO NOT wash with regular soap after you have used the CHG soap solution  Pat yourself dry with a clean, freshly-laundered towel.  DO NOT apply powders, deodorants, or lotions.  Dress in clean, freshly laundered nightclothes.    Be sure to sleep with clean, freshly laundered sheets.  Be aware that CHG will cause stains on fabrics; if you wash them with bleach after use.  Rinse your washcloth and other linens that have contact with CHG completely.  Use only non-chlorine detergents to launder the items used.   The morning of surgery is the fifth day.  Repeat the above steps and dress in clean comfortable clothing     Whom should I contact if I have any questions regarding the use of CHG soap?  Call the [facility] at [phone] if you have any questions.               Preoperative Brain Exercises    What are brain exercises?  A brain exercise is any activity that  engages your thinking (cognitive) skills.    What types of activities are considered brain exercises?  Jigsaw puzzles, crossword puzzles, word jumble, memory games, word search, and many more.  Many can be found free online or on your phone via a mobile [email or web address].    Why should I do brain exercises before my surgery?  More recent research has shown brain exercise before surgery can lower the risk of postoperative delirium (confusion) which can be especially important for older adults.  Patients who did brain exercises for 5 to 10 hours the days before surgery, cut their risk of postoperative delirium in half up to 1 week after surgery.         [facility]    Preoperative Deep Breathing Exercises    Why it is important to do deep breathing exercises before my surgery?  Deep breathing exercises strengthen your breathing muscles.  This helps you to recover after your surgery and decreases the chance of breathing complications.      How are the deep breathing exercises done?  Sit straight with your back supported.  Breathe in deeply and slowly through your nose. Your lower rib cage should expand and your abdomen may move forward.  Hold that breath for 3 to 5 seconds.  Breathe out through pursed lips, slowly and completely.  Rest and repeat 10 times every hour while awake.  Rest longer if you become dizzy or lightheaded.         Patient and Family Education             Ways You Can Help Prevent Blood Clots             This handout explains some simple things you can do to help prevent blood clots.      Blood clots are blockages that can form in the body's veins. When a blood clot forms in your deep veins, it may be called a deep vein thrombosis, or DVT for short. Blood clots can happen in any part of the body where blood flows, but they are most common in the arms and legs. If a piece of a blood clot breaks free and travels to the lungs, it is called a pulmonary embolus (PE). A PE can be a very  serious problem.         Being in the hospital or having surgery can raise your chances of getting a blood clot because you may not be well enough to move around as much as you normally do.         Ways you can help prevent blood clots in the hospital         Wearing SCDs. SCDs stands for Sequential Compression Devices.   SCDs are special sleeves that wrap around your legs  They attach to a pump that fills them with air to gently squeeze your legs every few minutes.   This helps return the blood in your legs to your heart.   SCDs should only be taken off when walking or bathing.   SCDs may not be comfortable, but they can help save your life.               Wearing compression stockings - if your doctor orders them. These special snug fitting stockings gently squeeze your legs to help blood flow.       Walking. Walking helps move the blood in your legs.   If your doctor says it is ok, try walking the halls at least   5 times a day. Ask us to help you get up, so you don't fall.      Taking any blood thinning medicines your doctor orders.        Page 1 of 2     [facility]; 3/23   Ways you can help prevent blood clots at home       Wearing compression stockings - if your doctor orders them. ? Walking - to help move the blood in your legs.       Taking any blood thinning medicines your doctor orders.      Signs of a blood clot or PE      Tell your doctor or nurse know right away if you have of the problems listed below.    If you are at home, seek medical care right away. Call 911 for chest pain or problems breathing.               Signs of a blood clot (DVT) - such as pain,  swelling, redness or warmth in your arm or leg      Signs of a pulmonary embolism (PE) - such as chest     pain or feeling short of breath

## 2024-09-06 NOTE — H&P (VIEW-ONLY)
CPM/PAT Evaluation       Name: Janelle Pitt (Janelle Pitt)  /Age: 1955/68 y.o.     Visit Type:   In-Person       Chief Complaint: low back pain, preop eval    HPI  The patient is a 68 year old female with complaints of ongoing low back pain and left lower extremity radiculopathy. Most recent imaging with severe degenerative changes involving the lumbar spine with complete disc disease and collapse multiple levels resulting in up-and-down stenosis of the foramina multiple levels from L1-2 down to L5-S1. Foraminal stenosis is moderate at L1-L2 3 and L3-4 and severe at L4-5 and L5-S1 level. She has failed medical management and wishes to proceed with surgical intervention. She presents today for perioperative evaluation in anticipation of L1-L2 3 L3-4 L4-5 laminectomy and facetectomy for decompression L5-S1 laminectomy and facetectomy for decompression with L5-S1 transforaminal lumbar interbody fusion with cage placement and T12-S1 instrumentation with fusion using autograft and BMP and pelvic fixation. - Bilateral on 24 with Dr. Barajas.    Past Medical History:   Diagnosis Date    Arthritis     Chronic pain disorder     Diverticulosis of large intestine without hemorrhage 2023    Dyslipidemia     Hyperlipidemia     Hyperplastic colon polyp 2023    Left foot drop     Low back pain     Osteoarthritis of right hip 2023    Osteoporosis     Peripheral arterial disease (CMS-HCC)     Peripheral neuropathy     Sacroiliac joint dysfunction of right side 2023    Spinal stenosis        Past Surgical History:   Procedure Laterality Date    COLONOSCOPY      TOTAL HIP ARTHROPLASTY Right 2022       Patient Sexual activity questions deferred to the physician.    Family History   Problem Relation Name Age of Onset    Alzheimer's disease Mother      Diabetes Brother         No Known Allergies    Prior to Admission medications    Medication Sig Start Date End Date Taking? Authorizing Provider    cyclobenzaprine (Flexeril) 5 mg tablet TAKE 1 TABLETS AT BEDTIME AS NEEDED FOR MUSCLE PAIN. DO NOT DRIVE OR OPERATE HEAVY MACHINERY WHILE TAKING 3/5/24   Kar Medina MD   gabapentin (Neurontin) 300 mg capsule Take 2 capsules (600 mg) by mouth once daily at bedtime. 8/9/24   Stephanie Leyva, APRN-CNP, DNP        PAT ROS:   Constitutional:   neg    Neuro/Psych:    Left drop foot   numbness (left lower extremity)  Eyes:   neg    Ears:   neg    Nose:   neg    Mouth:   neg    Throat:   neg    Neck:   neg    Cardio:   neg    Respiratory:   neg    Endocrine:   neg    GI:   neg    :   neg    Musculoskeletal:    arthralgias   myalgias  Hematologic:   neg    Skin:  neg        Physical Exam  Vitals reviewed.   Constitutional:       Appearance: Normal appearance.   HENT:      Head: Normocephalic and atraumatic.      Nose: Nose normal.      Mouth/Throat:      Mouth: Mucous membranes are moist.      Pharynx: Oropharynx is clear.   Eyes:      Extraocular Movements: Extraocular movements intact.      Conjunctiva/sclera: Conjunctivae normal.      Pupils: Pupils are equal, round, and reactive to light.   Cardiovascular:      Rate and Rhythm: Normal rate and regular rhythm.      Pulses: Normal pulses.      Heart sounds: Normal heart sounds.   Pulmonary:      Effort: Pulmonary effort is normal.      Breath sounds: Normal breath sounds.   Musculoskeletal:         General: Normal range of motion.      Cervical back: Normal range of motion.   Skin:     General: Skin is warm and dry.   Neurological:      General: No focal deficit present.      Mental Status: She is alert and oriented to person, place, and time.      Gait: Gait abnormal.      Comments: Uses cane   Psychiatric:         Mood and Affect: Mood normal.         Behavior: Behavior normal.        PAT AIRWAY:   Airway:     Mallampati::  II    TM distance::  >3 FB    Neck ROM::  Full  normal        Visit Vitals  /86   Pulse 98   Temp 36.7 °C (98.1 °F)   Ht 1.549  "m (5' 1\")   Wt 67.6 kg (149 lb 0.5 oz)   SpO2 96%   BMI 28.16 kg/m²   OB Status Postmenopausal   Smoking Status Never   BSA 1.71 m²          DASI Risk Score    No data to display       Caprini DVT Assessment      Flowsheet Row Most Recent Value   DVT Score 9   Current Status Major surgery planned, lasting over 3 hours   History Prior major surgery   Age 60-75 years   BMI 30 or less          Modified Frailty Index      Flowsheet Row Most Recent Value   Modified Frailty Index Calculator 0          CHADS2 Stroke Risk  Current as of 26 minutes ago        N/A 3 to 100%: High Risk   2 to < 3%: Medium Risk   0 to < 2%: Low Risk     Last Change: N/A          This score determines the patient's risk of having a stroke if the patient has atrial fibrillation.        This score is not applicable to this patient. Components are not calculated.          Revised Cardiac Risk Index      Flowsheet Row Most Recent Value   Revised Cardiac Risk Calculator 0          Apfel Simplified Score      Flowsheet Row Most Recent Value   Apfel Simplified Score Calculator 3          Risk Analysis Index Results This Encounter    No data found in the last 1 encounters.       Stop Bang Score      Flowsheet Row Most Recent Value   Do you snore loudly? 0   Do you often feel tired or fatigued after your sleep? 0   Has anyone ever observed you stop breathing in your sleep? 0   Do you have or are you being treated for high blood pressure? 0   Recent BMI (Calculated) 27.4   Is BMI greater than 35 kg/m2? 0=No   Age older than 50 years old? 1=Yes   Is your neck circumference greater than 17 inches (Male) or 16 inches (Female)? 0   Gender - Male 0=No   STOP-BANG Total Score 1          Recent Results (from the past 840 hour(s))   Lipid Panel    Collection Time: 08/09/24  9:45 AM   Result Value Ref Range    Cholesterol 209 (H) 0 - 199 mg/dL    HDL-Cholesterol 80.4 mg/dL    Cholesterol/HDL Ratio 2.6     LDL Calculated 106 (H) <=99 mg/dL    VLDL 23 0 - 40 mg/dL "    Triglycerides 113 0 - 149 mg/dL    Non HDL Cholesterol 129 0 - 149 mg/dL   CBC    Collection Time: 08/09/24  9:45 AM   Result Value Ref Range    WBC 5.1 4.4 - 11.3 x10*3/uL    nRBC 0.0 0.0 - 0.0 /100 WBCs    RBC 4.61 4.00 - 5.20 x10*6/uL    Hemoglobin 14.4 12.0 - 16.0 g/dL    Hematocrit 43.5 36.0 - 46.0 %    MCV 94 80 - 100 fL    MCH 31.2 26.0 - 34.0 pg    MCHC 33.1 32.0 - 36.0 g/dL    RDW 14.6 (H) 11.5 - 14.5 %    Platelets 305 150 - 450 x10*3/uL   Comprehensive Metabolic Panel    Collection Time: 08/09/24  9:45 AM   Result Value Ref Range    Glucose 104 (H) 74 - 99 mg/dL    Sodium 141 136 - 145 mmol/L    Potassium 3.6 3.5 - 5.3 mmol/L    Chloride 104 98 - 107 mmol/L    Bicarbonate 26 21 - 32 mmol/L    Anion Gap 15 10 - 20 mmol/L    Urea Nitrogen 10 6 - 23 mg/dL    Creatinine 0.77 0.50 - 1.05 mg/dL    eGFR 84 >60 mL/min/1.73m*2    Calcium 9.5 8.6 - 10.3 mg/dL    Albumin 3.9 3.4 - 5.0 g/dL    Alkaline Phosphatase 70 33 - 136 U/L    Total Protein 6.8 6.4 - 8.2 g/dL    AST 19 9 - 39 U/L    Bilirubin, Total 0.7 0.0 - 1.2 mg/dL    ALT 12 7 - 45 U/L   TSH with reflex to Free T4 if abnormal    Collection Time: 08/09/24  9:45 AM   Result Value Ref Range    Thyroid Stimulating Hormone 1.65 0.44 - 3.98 mIU/L        Diagnostic Results    -ECG; 2/28/23  Sinus rhythm with occasional Premature ventricular complexes  Low voltage QRS  Borderline ECG     - XR SCOLIOSIS 2 VIEw; 5/21/24  IMPRESSION:  S shaped thoracolumbar scoliosis as described above.  Straightening of normal thoracic and normal lumbar curvature.  Advanced lumbar spine degenerative changes from L2-3 to L5-S1.     - MR LUMBAR SPINE W AND WO IV CONTRAST; 5/10/2024   IMPRESSION:  1. Multilevel degenerative changes of the lumbar spine with  high-grade central canal and neural foraminal stenosis most  pronounced at L3-L4 and L4-L5 with likely compression of traversing  nerve roots in these regions. Please correlate clinically.     Assessment and Plan:      Anesthesia:  The patient denies problems with anesthesia in the past such as PONV, prolonged sedation, awareness, dental damage, aspiration, cardiac arrest, difficult intubation, or unexpected hospital admissions.     Neuro:   The patient has no neurological diagnoses or significant findings on chart review, clinical presentation, and evaluation. No grossly apparent neurological perioperative risk. The patient is at increased risk for perioperative stroke secondary to female gender, general anesthesia, operative time >2.5 hours. Handouts for preoperative brain exercises given to patient.    HEENT/Airway  No diagnoses, significant findings on chart review, clinical presentation, or evaluation. No documented or reported history of airway difficulty.     Cardiovascular  The patient has diagnoses or significant findings on chart review or clinical presentation and evaluation significant for hyperlipidemia not currently on medication. Follows with PCP.  He is scheduled for non-cardiac surgery associated with elevated risk. The patient has no major cardiac contraindications to non- cardiac surgery.  RCRI  The patient meets 0-1 RCRI criteria and therefore has a less than 1% risk of major adverse cardiac complications.  METS  The patient's functional capacity capacity is greater than 4 METS.  The patient has a 30-day risk for MACE of 0 predictors, 3.9% risk for cardiac death, nonfatal myocardial infarction, and nonfatal cardiac arrest.  LINSEY score which indicates a 0.1% risk of intraoperative or 30-day postoperative MACE (major adverse cardiac event).  Cardiology Evaluation  The patient is not followed by cardiology.    Pulmonary   No significant findings on chart review or clinical presentation and evaluation. The patient is at increased risk of perioperative pulmonary complications secondary to advanced age greater than 60.    The patient has a stop bang score of 1, which places patient at low risk for having  RAMY.    ARISCAT 26, Intermediate, 13.3% risk of in-hospital postoperative pulmonary complications  PRODIGY 8, intermediate risk of respiratory depression episode. Patient given PI sheet for preoperative deep breathing exercises.    Hematology  No diagnoses or significant findings on chart review or clinical presentation and evaluation.    Caprini score 9, high risk of perioperative VTE.     Patient instructed to ambulate as soon as possible postoperatively to decrease thromboembolic risk. Initiate mechanical DVT prophylaxis as soon as possible and initiate chemical prophylaxis when deemed safe from a bleeding standpoint post surgery.     Transfusion Evaluation  A type and screen was obtained given the likelihood for perioperative transfusion of blood or blood products.    Gastrointestinal  No diagnoses or significant findings on chart review or clinical presentation and evaluation.    Eat 10- 0,  self-perceived oropharyngeal dysphagia scale (0-40)     Genitourinary  No diagnoses or significant findings on chart review or clinical presentation and evaluation.    Renal  The patient has no known history of chronic kidney disease. No renal diagnoses or significant findings on chart review or clinical presentation and evaluation. The patient has specific risk factors associated with increased risk of perioperative renal complications related to age greater than 55. Preventative measures include preoperative hydration.    Musculoskeletal  The patient has diagnoses or significant findings on chart review or clinical presentation and evaluation significant for low back pain, lumbar stenosis scheduled for surgery with Dr. Barajas on 9/20/24.  Pain Management: JEREMY Saleh- CNP LOV 8/9/24     Endocrine  No diagnoses or significant findings on chart review or clinical presentation and evaluation.    ID  No diagnoses or significant findings on chart review or clinical presentation and evaluation. MRSA screening obtained.  Prescriptions and instructions given for Hibiclens and Peridex.    -Preoperative medication instructions were provided and reviewed with the patient.  Any additional testing or evaluation was explained to the patient.  NPO Instructions were discussed, and the patient's questions were answered prior to conclusion of this encounter. Patient verbalized understanding of preoperative instructions. After Visit Summary given.

## 2024-09-06 NOTE — CPM/PAT H&P
CPM/PAT Evaluation       Name: Janelle Pitt (Janelle Pitt)  /Age: 1955/68 y.o.     Visit Type:   In-Person       Chief Complaint: low back pain, preop eval    HPI  The patient is a 68 year old female with complaints of ongoing low back pain and left lower extremity radiculopathy. Most recent imaging with severe degenerative changes involving the lumbar spine with complete disc disease and collapse multiple levels resulting in up-and-down stenosis of the foramina multiple levels from L1-2 down to L5-S1. Foraminal stenosis is moderate at L1-L2 3 and L3-4 and severe at L4-5 and L5-S1 level. She has failed medical management and wishes to proceed with surgical intervention. She presents today for perioperative evaluation in anticipation of L1-L2 3 L3-4 L4-5 laminectomy and facetectomy for decompression L5-S1 laminectomy and facetectomy for decompression with L5-S1 transforaminal lumbar interbody fusion with cage placement and T12-S1 instrumentation with fusion using autograft and BMP and pelvic fixation. - Bilateral on 24 with Dr. Barajas.    Past Medical History:   Diagnosis Date    Arthritis     Chronic pain disorder     Diverticulosis of large intestine without hemorrhage 2023    Dyslipidemia     Hyperlipidemia     Hyperplastic colon polyp 2023    Left foot drop     Low back pain     Osteoarthritis of right hip 2023    Osteoporosis     Peripheral arterial disease (CMS-HCC)     Peripheral neuropathy     Sacroiliac joint dysfunction of right side 2023    Spinal stenosis        Past Surgical History:   Procedure Laterality Date    COLONOSCOPY      TOTAL HIP ARTHROPLASTY Right 2022       Patient Sexual activity questions deferred to the physician.    Family History   Problem Relation Name Age of Onset    Alzheimer's disease Mother      Diabetes Brother         No Known Allergies    Prior to Admission medications    Medication Sig Start Date End Date Taking? Authorizing Provider    cyclobenzaprine (Flexeril) 5 mg tablet TAKE 1 TABLETS AT BEDTIME AS NEEDED FOR MUSCLE PAIN. DO NOT DRIVE OR OPERATE HEAVY MACHINERY WHILE TAKING 3/5/24   Kar Medina MD   gabapentin (Neurontin) 300 mg capsule Take 2 capsules (600 mg) by mouth once daily at bedtime. 8/9/24   Stephanie Leyva, APRN-CNP, DNP        PAT ROS:   Constitutional:   neg    Neuro/Psych:    Left drop foot   numbness (left lower extremity)  Eyes:   neg    Ears:   neg    Nose:   neg    Mouth:   neg    Throat:   neg    Neck:   neg    Cardio:   neg    Respiratory:   neg    Endocrine:   neg    GI:   neg    :   neg    Musculoskeletal:    arthralgias   myalgias  Hematologic:   neg    Skin:  neg        Physical Exam  Vitals reviewed.   Constitutional:       Appearance: Normal appearance.   HENT:      Head: Normocephalic and atraumatic.      Nose: Nose normal.      Mouth/Throat:      Mouth: Mucous membranes are moist.      Pharynx: Oropharynx is clear.   Eyes:      Extraocular Movements: Extraocular movements intact.      Conjunctiva/sclera: Conjunctivae normal.      Pupils: Pupils are equal, round, and reactive to light.   Cardiovascular:      Rate and Rhythm: Normal rate and regular rhythm.      Pulses: Normal pulses.      Heart sounds: Normal heart sounds.   Pulmonary:      Effort: Pulmonary effort is normal.      Breath sounds: Normal breath sounds.   Musculoskeletal:         General: Normal range of motion.      Cervical back: Normal range of motion.   Skin:     General: Skin is warm and dry.   Neurological:      General: No focal deficit present.      Mental Status: She is alert and oriented to person, place, and time.      Gait: Gait abnormal.      Comments: Uses cane   Psychiatric:         Mood and Affect: Mood normal.         Behavior: Behavior normal.        PAT AIRWAY:   Airway:     Mallampati::  II    TM distance::  >3 FB    Neck ROM::  Full  normal        Visit Vitals  /86   Pulse 98   Temp 36.7 °C (98.1 °F)   Ht 1.549  "m (5' 1\")   Wt 67.6 kg (149 lb 0.5 oz)   SpO2 96%   BMI 28.16 kg/m²   OB Status Postmenopausal   Smoking Status Never   BSA 1.71 m²          DASI Risk Score    No data to display       Caprini DVT Assessment      Flowsheet Row Most Recent Value   DVT Score 9   Current Status Major surgery planned, lasting over 3 hours   History Prior major surgery   Age 60-75 years   BMI 30 or less          Modified Frailty Index      Flowsheet Row Most Recent Value   Modified Frailty Index Calculator 0          CHADS2 Stroke Risk  Current as of 26 minutes ago        N/A 3 to 100%: High Risk   2 to < 3%: Medium Risk   0 to < 2%: Low Risk     Last Change: N/A          This score determines the patient's risk of having a stroke if the patient has atrial fibrillation.        This score is not applicable to this patient. Components are not calculated.          Revised Cardiac Risk Index      Flowsheet Row Most Recent Value   Revised Cardiac Risk Calculator 0          Apfel Simplified Score      Flowsheet Row Most Recent Value   Apfel Simplified Score Calculator 3          Risk Analysis Index Results This Encounter    No data found in the last 1 encounters.       Stop Bang Score      Flowsheet Row Most Recent Value   Do you snore loudly? 0   Do you often feel tired or fatigued after your sleep? 0   Has anyone ever observed you stop breathing in your sleep? 0   Do you have or are you being treated for high blood pressure? 0   Recent BMI (Calculated) 27.4   Is BMI greater than 35 kg/m2? 0=No   Age older than 50 years old? 1=Yes   Is your neck circumference greater than 17 inches (Male) or 16 inches (Female)? 0   Gender - Male 0=No   STOP-BANG Total Score 1          Recent Results (from the past 840 hour(s))   Lipid Panel    Collection Time: 08/09/24  9:45 AM   Result Value Ref Range    Cholesterol 209 (H) 0 - 199 mg/dL    HDL-Cholesterol 80.4 mg/dL    Cholesterol/HDL Ratio 2.6     LDL Calculated 106 (H) <=99 mg/dL    VLDL 23 0 - 40 mg/dL "    Triglycerides 113 0 - 149 mg/dL    Non HDL Cholesterol 129 0 - 149 mg/dL   CBC    Collection Time: 08/09/24  9:45 AM   Result Value Ref Range    WBC 5.1 4.4 - 11.3 x10*3/uL    nRBC 0.0 0.0 - 0.0 /100 WBCs    RBC 4.61 4.00 - 5.20 x10*6/uL    Hemoglobin 14.4 12.0 - 16.0 g/dL    Hematocrit 43.5 36.0 - 46.0 %    MCV 94 80 - 100 fL    MCH 31.2 26.0 - 34.0 pg    MCHC 33.1 32.0 - 36.0 g/dL    RDW 14.6 (H) 11.5 - 14.5 %    Platelets 305 150 - 450 x10*3/uL   Comprehensive Metabolic Panel    Collection Time: 08/09/24  9:45 AM   Result Value Ref Range    Glucose 104 (H) 74 - 99 mg/dL    Sodium 141 136 - 145 mmol/L    Potassium 3.6 3.5 - 5.3 mmol/L    Chloride 104 98 - 107 mmol/L    Bicarbonate 26 21 - 32 mmol/L    Anion Gap 15 10 - 20 mmol/L    Urea Nitrogen 10 6 - 23 mg/dL    Creatinine 0.77 0.50 - 1.05 mg/dL    eGFR 84 >60 mL/min/1.73m*2    Calcium 9.5 8.6 - 10.3 mg/dL    Albumin 3.9 3.4 - 5.0 g/dL    Alkaline Phosphatase 70 33 - 136 U/L    Total Protein 6.8 6.4 - 8.2 g/dL    AST 19 9 - 39 U/L    Bilirubin, Total 0.7 0.0 - 1.2 mg/dL    ALT 12 7 - 45 U/L   TSH with reflex to Free T4 if abnormal    Collection Time: 08/09/24  9:45 AM   Result Value Ref Range    Thyroid Stimulating Hormone 1.65 0.44 - 3.98 mIU/L        Diagnostic Results    -ECG; 2/28/23  Sinus rhythm with occasional Premature ventricular complexes  Low voltage QRS  Borderline ECG     - XR SCOLIOSIS 2 VIEw; 5/21/24  IMPRESSION:  S shaped thoracolumbar scoliosis as described above.  Straightening of normal thoracic and normal lumbar curvature.  Advanced lumbar spine degenerative changes from L2-3 to L5-S1.     - MR LUMBAR SPINE W AND WO IV CONTRAST; 5/10/2024   IMPRESSION:  1. Multilevel degenerative changes of the lumbar spine with  high-grade central canal and neural foraminal stenosis most  pronounced at L3-L4 and L4-L5 with likely compression of traversing  nerve roots in these regions. Please correlate clinically.     Assessment and Plan:      Anesthesia:  The patient denies problems with anesthesia in the past such as PONV, prolonged sedation, awareness, dental damage, aspiration, cardiac arrest, difficult intubation, or unexpected hospital admissions.     Neuro:   The patient has no neurological diagnoses or significant findings on chart review, clinical presentation, and evaluation. No grossly apparent neurological perioperative risk. The patient is at increased risk for perioperative stroke secondary to female gender, general anesthesia, operative time >2.5 hours. Handouts for preoperative brain exercises given to patient.    HEENT/Airway  No diagnoses, significant findings on chart review, clinical presentation, or evaluation. No documented or reported history of airway difficulty.     Cardiovascular  The patient has diagnoses or significant findings on chart review or clinical presentation and evaluation significant for hyperlipidemia not currently on medication. Follows with PCP.  He is scheduled for non-cardiac surgery associated with elevated risk. The patient has no major cardiac contraindications to non- cardiac surgery.  RCRI  The patient meets 0-1 RCRI criteria and therefore has a less than 1% risk of major adverse cardiac complications.  METS  The patient's functional capacity capacity is greater than 4 METS.  The patient has a 30-day risk for MACE of 0 predictors, 3.9% risk for cardiac death, nonfatal myocardial infarction, and nonfatal cardiac arrest.  LINSEY score which indicates a 0.1% risk of intraoperative or 30-day postoperative MACE (major adverse cardiac event).  Cardiology Evaluation  The patient is not followed by cardiology.    Pulmonary   No significant findings on chart review or clinical presentation and evaluation. The patient is at increased risk of perioperative pulmonary complications secondary to advanced age greater than 60.    The patient has a stop bang score of 1, which places patient at low risk for having  RAMY.    ARISCAT 26, Intermediate, 13.3% risk of in-hospital postoperative pulmonary complications  PRODIGY 8, intermediate risk of respiratory depression episode. Patient given PI sheet for preoperative deep breathing exercises.    Hematology  No diagnoses or significant findings on chart review or clinical presentation and evaluation.    Caprini score 9, high risk of perioperative VTE.     Patient instructed to ambulate as soon as possible postoperatively to decrease thromboembolic risk. Initiate mechanical DVT prophylaxis as soon as possible and initiate chemical prophylaxis when deemed safe from a bleeding standpoint post surgery.     Transfusion Evaluation  A type and screen was obtained given the likelihood for perioperative transfusion of blood or blood products.    Gastrointestinal  No diagnoses or significant findings on chart review or clinical presentation and evaluation.    Eat 10- 0,  self-perceived oropharyngeal dysphagia scale (0-40)     Genitourinary  No diagnoses or significant findings on chart review or clinical presentation and evaluation.    Renal  The patient has no known history of chronic kidney disease. No renal diagnoses or significant findings on chart review or clinical presentation and evaluation. The patient has specific risk factors associated with increased risk of perioperative renal complications related to age greater than 55. Preventative measures include preoperative hydration.    Musculoskeletal  The patient has diagnoses or significant findings on chart review or clinical presentation and evaluation significant for low back pain, lumbar stenosis scheduled for surgery with Dr. Barajas on 9/20/24.  Pain Management: JEREMY Salhe- CNP LOV 8/9/24     Endocrine  No diagnoses or significant findings on chart review or clinical presentation and evaluation.    ID  No diagnoses or significant findings on chart review or clinical presentation and evaluation. MRSA screening obtained.  Prescriptions and instructions given for Hibiclens and Peridex.    -Preoperative medication instructions were provided and reviewed with the patient.  Any additional testing or evaluation was explained to the patient.  NPO Instructions were discussed, and the patient's questions were answered prior to conclusion of this encounter. Patient verbalized understanding of preoperative instructions. After Visit Summary given.

## 2024-09-08 LAB — STAPHYLOCOCCUS SPEC CULT: NORMAL

## 2024-09-11 ENCOUNTER — OFFICE VISIT (OUTPATIENT)
Dept: PRIMARY CARE | Facility: CLINIC | Age: 69
End: 2024-09-11
Payer: MEDICARE

## 2024-09-11 ENCOUNTER — APPOINTMENT (OUTPATIENT)
Dept: PRIMARY CARE | Facility: CLINIC | Age: 69
End: 2024-09-11
Payer: MEDICARE

## 2024-09-11 VITALS
HEART RATE: 79 BPM | TEMPERATURE: 97.8 F | WEIGHT: 149.2 LBS | DIASTOLIC BLOOD PRESSURE: 68 MMHG | SYSTOLIC BLOOD PRESSURE: 120 MMHG | OXYGEN SATURATION: 98 % | BODY MASS INDEX: 28.17 KG/M2 | HEIGHT: 61 IN

## 2024-09-11 DIAGNOSIS — Z23 NEED FOR INFLUENZA VACCINATION: ICD-10-CM

## 2024-09-11 DIAGNOSIS — M48.061 LUMBAR FORAMINAL STENOSIS: Primary | ICD-10-CM

## 2024-09-11 DIAGNOSIS — Z01.818 PREOPERATIVE CLEARANCE: ICD-10-CM

## 2024-09-11 PROCEDURE — 3008F BODY MASS INDEX DOCD: CPT | Performed by: FAMILY MEDICINE

## 2024-09-11 PROCEDURE — 1159F MED LIST DOCD IN RCRD: CPT | Performed by: FAMILY MEDICINE

## 2024-09-11 PROCEDURE — 99213 OFFICE O/P EST LOW 20 MIN: CPT | Performed by: FAMILY MEDICINE

## 2024-09-11 PROCEDURE — G0008 ADMIN INFLUENZA VIRUS VAC: HCPCS | Performed by: FAMILY MEDICINE

## 2024-09-11 PROCEDURE — 1036F TOBACCO NON-USER: CPT | Performed by: FAMILY MEDICINE

## 2024-09-11 PROCEDURE — 1157F ADVNC CARE PLAN IN RCRD: CPT | Performed by: FAMILY MEDICINE

## 2024-09-11 PROCEDURE — 90662 IIV NO PRSV INCREASED AG IM: CPT | Performed by: FAMILY MEDICINE

## 2024-09-11 NOTE — PROGRESS NOTES
"Subjective   Patient ID: Janelle Pitt is a 68 y.o. female who presents for Procedure (Surgery clearance. Lower spine ) and Results (Discuss bw. Had per admission testing and it said Rh positive.).  HPI  Here for follow up   Going for lumbar laminectomy and decompression next week for foot drop and lumbar stenosis.   Had pre-op testing already.   Denies any chest pain, no dysuria or increased urine frequency. No issues with anesthesia, or snoring or gasping for breath.   Patient cleared to proceed with surgery.    Review of Systems  General: no fever  Eyes: no blurry vision  ENT: no sore throat, no ear pain  Resp: no cough, sob or wheezing  Cardio: no chest pain, no palpitations  Abd: no nausea/vomiting  : no dysuria, no increased urinary frequency      /68   Pulse 79   Temp 36.6 °C (97.8 °F)   Ht 1.549 m (5' 1\")   Wt 67.7 kg (149 lb 3.2 oz)   SpO2 98%   BMI 28.19 kg/m²       Objective   Physical Exam  Gen: NAD, alert  Head: normocephalic/atraumatic  Eyes: conjunctivae normal  Ears: canals clear bilaterally, TM normal   Nose: external nose normal   Oropharynx: clear   Resp: Clear to auscultation  CVS: Regular rate and rhythm  Abdomen: soft, NT, ND  Ext: no edema, NT of lower extremities  Neuro: ambulating with cane    Lab Results   Component Value Date    WBC 5.1 08/09/2024    HGB 14.4 08/09/2024    HCT 43.5 08/09/2024    MCV 94 08/09/2024     08/09/2024     Lab Results   Component Value Date    GLUCOSE 104 (H) 08/09/2024    CALCIUM 9.5 08/09/2024     08/09/2024    K 3.6 08/09/2024    CO2 26 08/09/2024     08/09/2024    BUN 10 08/09/2024    CREATININE 0.77 08/09/2024       Assessment/Plan   Problem List Items Addressed This Visit       Lumbar foraminal stenosis - Primary  Going for surgery next week, patient cleared for surgeyr     Other Visit Diagnoses       Need for influenza vaccination        Relevant Orders    Flu vaccine, trivalent, preservative free, HIGH-DOSE, age 65y+ " (Fluzone) (Completed)    Preoperative clearance      Patient cleared for surgery

## 2024-09-19 ENCOUNTER — ANESTHESIA EVENT (OUTPATIENT)
Dept: OPERATING ROOM | Facility: HOSPITAL | Age: 69
End: 2024-09-19
Payer: MEDICARE

## 2024-09-19 NOTE — PROGRESS NOTES
Pharmacy Medication History Review    Janelle Pitt is a 69 y.o. female who is planned to be admitted for No Principal Problem: There is no principal problem currently on the Problem List. Please update the Problem List and refresh.. Pharmacy called the patient prior to their scheduled procedure and reviewed the patient's [id number] medications for accuracy.    Medications ADDED:  none  Medications CHANGED:  none  Medications REMOVED:   none    Please review updated prior to admission medication list and comments regarding how patient may be taking medications differently by going to Admission tab --> Admission Orders --> Admit Orders / Review prior to admission medications.     Preferred pharmacy and allergies to be confirmed with patient by nursing the day of procedure.     Sources used to complete the med history include spoke with patient, surescripts, oarrs, care everywhere    Below are additional concerns with the patient's PTA list.  none    Yoli Sexton    [facility]  Please reach out via Secure Chat for questions

## 2024-09-20 ENCOUNTER — APPOINTMENT (OUTPATIENT)
Dept: RADIOLOGY | Facility: HOSPITAL | Age: 69
DRG: 454 | End: 2024-09-20
Payer: MEDICARE

## 2024-09-20 ENCOUNTER — HOSPITAL ENCOUNTER (INPATIENT)
Facility: HOSPITAL | Age: 69
DRG: 454 | End: 2024-09-20
Attending: NEUROLOGICAL SURGERY | Admitting: NEUROLOGICAL SURGERY
Payer: MEDICARE

## 2024-09-20 ENCOUNTER — ANESTHESIA (OUTPATIENT)
Dept: OPERATING ROOM | Facility: HOSPITAL | Age: 69
End: 2024-09-20
Payer: MEDICARE

## 2024-09-20 DIAGNOSIS — K59.03 CONSTIPATION DUE TO PAIN MEDICATION: ICD-10-CM

## 2024-09-20 DIAGNOSIS — G89.18 POSTOPERATIVE PAIN: ICD-10-CM

## 2024-09-20 DIAGNOSIS — M21.372 LEFT FOOT DROP: Primary | ICD-10-CM

## 2024-09-20 DIAGNOSIS — M48.061 LUMBAR FORAMINAL STENOSIS: ICD-10-CM

## 2024-09-20 DIAGNOSIS — M41.55 SCOLIOSIS OF THORACOLUMBAR REGION DUE TO DEGENERATIVE DISEASE OF SPINE IN ADULT: ICD-10-CM

## 2024-09-20 DIAGNOSIS — M54.16 LUMBAR RADICULOPATHY, CHRONIC: ICD-10-CM

## 2024-09-20 PROBLEM — F41.9 ANXIETY: Status: ACTIVE | Noted: 2024-09-20

## 2024-09-20 PROBLEM — I10 HTN (HYPERTENSION): Status: ACTIVE | Noted: 2024-09-20

## 2024-09-20 PROBLEM — M54.50 CHRONIC LOW BACK PAIN: Status: ACTIVE | Noted: 2024-09-20

## 2024-09-20 PROBLEM — E11.9 TYPE 2 DIABETES MELLITUS, WITHOUT LONG-TERM CURRENT USE OF INSULIN (MULTI): Status: ACTIVE | Noted: 2024-09-20

## 2024-09-20 PROBLEM — M51.9 LUMBAR DISC DISEASE: Status: ACTIVE | Noted: 2024-09-20

## 2024-09-20 PROBLEM — M16.12 PRIMARY OSTEOARTHRITIS OF LEFT HIP: Status: ACTIVE | Noted: 2024-09-20

## 2024-09-20 PROBLEM — F32.A DEPRESSION: Status: ACTIVE | Noted: 2024-09-20

## 2024-09-20 PROBLEM — G89.4 CHRONIC PAIN SYNDROME: Status: ACTIVE | Noted: 2024-09-20

## 2024-09-20 PROBLEM — G89.29 CHRONIC LOW BACK PAIN: Status: ACTIVE | Noted: 2024-09-20

## 2024-09-20 LAB
ABO GROUP (TYPE) IN BLOOD: NORMAL
ANION GAP BLDA CALCULATED.4IONS-SCNC: 10 MMO/L (ref 10–25)
ANION GAP BLDA CALCULATED.4IONS-SCNC: 7 MMO/L (ref 10–25)
BASE EXCESS BLDA CALC-SCNC: -1.3 MMOL/L (ref -2–3)
BASE EXCESS BLDA CALC-SCNC: -1.8 MMOL/L (ref -2–3)
BODY TEMPERATURE: 37 DEGREES CELSIUS
BODY TEMPERATURE: 37 DEGREES CELSIUS
CA-I BLDA-SCNC: 1.21 MMOL/L (ref 1.1–1.33)
CA-I BLDA-SCNC: 1.23 MMOL/L (ref 1.1–1.33)
CHLORIDE BLDA-SCNC: 108 MMOL/L (ref 98–107)
CHLORIDE BLDA-SCNC: 108 MMOL/L (ref 98–107)
GLUCOSE BLDA-MCNC: 120 MG/DL (ref 74–99)
GLUCOSE BLDA-MCNC: 138 MG/DL (ref 74–99)
HCO3 BLDA-SCNC: 22.1 MMOL/L (ref 22–26)
HCO3 BLDA-SCNC: 24.8 MMOL/L (ref 22–26)
HCT VFR BLD EST: 38 % (ref 36–46)
HCT VFR BLD EST: 38 % (ref 36–46)
HGB BLDA-MCNC: 12.5 G/DL (ref 12–16)
HGB BLDA-MCNC: 12.7 G/DL (ref 12–16)
INHALED O2 CONCENTRATION: 50 %
INHALED O2 CONCENTRATION: 60 %
LACTATE BLDA-SCNC: 1.8 MMOL/L (ref 0.4–2)
LACTATE BLDA-SCNC: 2.9 MMOL/L (ref 0.4–2)
OXYHGB MFR BLDA: 97.1 % (ref 94–98)
OXYHGB MFR BLDA: 97.3 % (ref 94–98)
PCO2 BLDA: 34 MM HG (ref 38–42)
PCO2 BLDA: 46 MM HG (ref 38–42)
PH BLDA: 7.34 PH (ref 7.38–7.42)
PH BLDA: 7.42 PH (ref 7.38–7.42)
PO2 BLDA: 177 MM HG (ref 85–95)
PO2 BLDA: 195 MM HG (ref 85–95)
POTASSIUM BLDA-SCNC: 3.3 MMOL/L (ref 3.5–5.3)
POTASSIUM BLDA-SCNC: 3.8 MMOL/L (ref 3.5–5.3)
RH FACTOR (ANTIGEN D): NORMAL
SAO2 % BLDA: 100 % (ref 94–100)
SAO2 % BLDA: 100 % (ref 94–100)
SODIUM BLDA-SCNC: 136 MMOL/L (ref 136–145)
SODIUM BLDA-SCNC: 136 MMOL/L (ref 136–145)

## 2024-09-20 PROCEDURE — 2500000005 HC RX 250 GENERAL PHARMACY W/O HCPCS: Performed by: STUDENT IN AN ORGANIZED HEALTH CARE EDUCATION/TRAINING PROGRAM

## 2024-09-20 PROCEDURE — 7100000001 HC RECOVERY ROOM TIME - INITIAL BASE CHARGE: Performed by: NEUROLOGICAL SURGERY

## 2024-09-20 PROCEDURE — 8E0WXBZ COMPUTER ASSISTED PROCEDURE OF TRUNK REGION: ICD-10-PCS | Performed by: NEUROLOGICAL SURGERY

## 2024-09-20 PROCEDURE — 61783 SCAN PROC SPINAL: CPT | Performed by: NEUROLOGICAL SURGERY

## 2024-09-20 PROCEDURE — 7100000002 HC RECOVERY ROOM TIME - EACH INCREMENTAL 1 MINUTE: Performed by: NEUROLOGICAL SURGERY

## 2024-09-20 PROCEDURE — 63048 LAM FACETEC &FORAMOT EA ADDL: CPT | Performed by: NEUROLOGICAL SURGERY

## 2024-09-20 PROCEDURE — 2500000004 HC RX 250 GENERAL PHARMACY W/ HCPCS (ALT 636 FOR OP/ED): Performed by: NEUROLOGICAL SURGERY

## 2024-09-20 PROCEDURE — 3600000017 HC OR TIME - EACH INCREMENTAL 1 MINUTE - PROCEDURE LEVEL SIX: Performed by: NEUROLOGICAL SURGERY

## 2024-09-20 PROCEDURE — 22853 INSJ BIOMECHANICAL DEVICE: CPT | Performed by: NEUROLOGICAL SURGERY

## 2024-09-20 PROCEDURE — C1776 JOINT DEVICE (IMPLANTABLE): HCPCS | Performed by: NEUROLOGICAL SURGERY

## 2024-09-20 PROCEDURE — 22842 INSERT SPINE FIXATION DEVICE: CPT | Performed by: NEUROLOGICAL SURGERY

## 2024-09-20 PROCEDURE — 3700000002 HC GENERAL ANESTHESIA TIME - EACH INCREMENTAL 1 MINUTE: Performed by: NEUROLOGICAL SURGERY

## 2024-09-20 PROCEDURE — 05HY33Z INSERTION OF INFUSION DEVICE INTO UPPER VEIN, PERCUTANEOUS APPROACH: ICD-10-PCS | Performed by: ANESTHESIOLOGY

## 2024-09-20 PROCEDURE — C1713 ANCHOR/SCREW BN/BN,TIS/BN: HCPCS | Performed by: NEUROLOGICAL SURGERY

## 2024-09-20 PROCEDURE — 0SG30AJ FUSION OF LUMBOSACRAL JOINT WITH INTERBODY FUSION DEVICE, POSTERIOR APPROACH, ANTERIOR COLUMN, OPEN APPROACH: ICD-10-PCS | Performed by: NEUROLOGICAL SURGERY

## 2024-09-20 PROCEDURE — 01NB0ZZ RELEASE LUMBAR NERVE, OPEN APPROACH: ICD-10-PCS | Performed by: NEUROLOGICAL SURGERY

## 2024-09-20 PROCEDURE — 0SG3071 FUSION OF LUMBOSACRAL JOINT WITH AUTOLOGOUS TISSUE SUBSTITUTE, POSTERIOR APPROACH, POSTERIOR COLUMN, OPEN APPROACH: ICD-10-PCS | Performed by: NEUROLOGICAL SURGERY

## 2024-09-20 PROCEDURE — 63052 LAM FACETC/FRMT ARTHRD LUM 1: CPT | Performed by: NEUROLOGICAL SURGERY

## 2024-09-20 PROCEDURE — 3700000001 HC GENERAL ANESTHESIA TIME - INITIAL BASE CHARGE: Performed by: NEUROLOGICAL SURGERY

## 2024-09-20 PROCEDURE — 2500000004 HC RX 250 GENERAL PHARMACY W/ HCPCS (ALT 636 FOR OP/ED): Mod: JZ,JG | Performed by: STUDENT IN AN ORGANIZED HEALTH CARE EDUCATION/TRAINING PROGRAM

## 2024-09-20 PROCEDURE — 2500000005 HC RX 250 GENERAL PHARMACY W/O HCPCS: Performed by: NEUROLOGICAL SURGERY

## 2024-09-20 PROCEDURE — 0SG0071 FUSION OF LUMBAR VERTEBRAL JOINT WITH AUTOLOGOUS TISSUE SUBSTITUTE, POSTERIOR APPROACH, POSTERIOR COLUMN, OPEN APPROACH: ICD-10-PCS | Performed by: NEUROLOGICAL SURGERY

## 2024-09-20 PROCEDURE — 63047 LAM FACETEC & FORAMOT LUMBAR: CPT | Performed by: NEUROLOGICAL SURGERY

## 2024-09-20 PROCEDURE — 2720000007 HC OR 272 NO HCPCS: Performed by: NEUROLOGICAL SURGERY

## 2024-09-20 PROCEDURE — 84132 ASSAY OF SERUM POTASSIUM: CPT | Performed by: STUDENT IN AN ORGANIZED HEALTH CARE EDUCATION/TRAINING PROGRAM

## 2024-09-20 PROCEDURE — 22633 ARTHRD CMBN 1NTRSPC LUMBAR: CPT | Performed by: NEUROLOGICAL SURGERY

## 2024-09-20 PROCEDURE — 2500000001 HC RX 250 WO HCPCS SELF ADMINISTERED DRUGS (ALT 637 FOR MEDICARE OP): Performed by: NEUROLOGICAL SURGERY

## 2024-09-20 PROCEDURE — 36415 COLL VENOUS BLD VENIPUNCTURE: CPT | Performed by: ANESTHESIOLOGY

## 2024-09-20 PROCEDURE — 63053 LAM FACTC/FRMT ARTHRD LUM EA: CPT | Performed by: NEUROLOGICAL SURGERY

## 2024-09-20 PROCEDURE — 2780000003 HC OR 278 NO HCPCS: Performed by: NEUROLOGICAL SURGERY

## 2024-09-20 PROCEDURE — 3600000018 HC OR TIME - INITIAL BASE CHARGE - PROCEDURE LEVEL SIX: Performed by: NEUROLOGICAL SURGERY

## 2024-09-20 PROCEDURE — P9045 ALBUMIN (HUMAN), 5%, 250 ML: HCPCS | Mod: JZ,JG | Performed by: STUDENT IN AN ORGANIZED HEALTH CARE EDUCATION/TRAINING PROGRAM

## 2024-09-20 PROCEDURE — C1821 INTERSPINOUS IMPLANT: HCPCS | Performed by: NEUROLOGICAL SURGERY

## 2024-09-20 PROCEDURE — 22614 ARTHRD PST TQ 1NTRSPC EA ADD: CPT | Performed by: NEUROLOGICAL SURGERY

## 2024-09-20 PROCEDURE — 1200000002 HC GENERAL ROOM WITH TELEMETRY DAILY

## 2024-09-20 PROCEDURE — C1762 CONN TISS, HUMAN(INC FASCIA): HCPCS | Performed by: NEUROLOGICAL SURGERY

## 2024-09-20 PROCEDURE — 22848 INSERT PELV FIXATION DEVICE: CPT | Performed by: NEUROLOGICAL SURGERY

## 2024-09-20 DEVICE — BONE GRAFT KIT 7510800 INFUSE LARGE II
Type: IMPLANTABLE DEVICE | Site: BACK | Status: FUNCTIONAL
Brand: INFUSE® BONE GRAFT

## 2024-09-20 DEVICE — SCREW, RELINE-O, 6.5X45MM 2S POLYAXIAL: Type: IMPLANTABLE DEVICE | Site: BACK | Status: FUNCTIONAL

## 2024-09-20 DEVICE — SCREW, RELINE LOCK, 5.5MM OPEN TULIP: Type: IMPLANTABLE DEVICE | Site: BACK | Status: FUNCTIONAL

## 2024-09-20 DEVICE — SCREW, RELINE-O, 7.5X45MM 2S POLYAXIAL: Type: IMPLANTABLE DEVICE | Site: BACK | Status: FUNCTIONAL

## 2024-09-20 DEVICE — IMPLANTABLE DEVICE: Type: IMPLANTABLE DEVICE | Site: SPINE LUMBAR | Status: FUNCTIONAL

## 2024-09-20 DEVICE — SCREW, RELINE-O, 6.5X50MM 2S POLYAXIAL: Type: IMPLANTABLE DEVICE | Site: BACK | Status: FUNCTIONAL

## 2024-09-20 DEVICE — SCREW, RELINE-O, 7.5X50MM 2S POLYAXIAL: Type: IMPLANTABLE DEVICE | Site: BACK | Status: FUNCTIONAL

## 2024-09-20 DEVICE — SCREW, RELINE-O, 8.5X80MM 2S POLY ILIAC: Type: IMPLANTABLE DEVICE | Site: BACK | Status: FUNCTIONAL

## 2024-09-20 DEVICE — CONNECTOR RELINE-O, 20MM OPEN OFFSET: Type: IMPLANTABLE DEVICE | Site: BACK | Status: FUNCTIONAL

## 2024-09-20 RX ORDER — LIDOCAINE HYDROCHLORIDE 10 MG/ML
0.1 INJECTION, SOLUTION INFILTRATION; PERINEURAL ONCE
Status: DISCONTINUED | OUTPATIENT
Start: 2024-09-20 | End: 2024-09-20 | Stop reason: HOSPADM

## 2024-09-20 RX ORDER — CYCLOBENZAPRINE HCL 10 MG
5 TABLET ORAL 3 TIMES DAILY
Status: DISCONTINUED | OUTPATIENT
Start: 2024-09-20 | End: 2024-09-25 | Stop reason: HOSPADM

## 2024-09-20 RX ORDER — HYDRALAZINE HYDROCHLORIDE 20 MG/ML
5 INJECTION INTRAMUSCULAR; INTRAVENOUS EVERY 10 MIN PRN
Status: DISCONTINUED | OUTPATIENT
Start: 2024-09-20 | End: 2024-09-20 | Stop reason: HOSPADM

## 2024-09-20 RX ORDER — PHENYLEPHRINE HCL IN 0.9% NACL 0.4MG/10ML
SYRINGE (ML) INTRAVENOUS AS NEEDED
Status: DISCONTINUED | OUTPATIENT
Start: 2024-09-20 | End: 2024-09-20

## 2024-09-20 RX ORDER — ONDANSETRON 4 MG/1
4 TABLET, FILM COATED ORAL EVERY 8 HOURS PRN
Status: DISCONTINUED | OUTPATIENT
Start: 2024-09-20 | End: 2024-09-25 | Stop reason: HOSPADM

## 2024-09-20 RX ORDER — METHADONE IN SOD CHLOR,ISO-OSM 10 MG/ML
SYRINGE (ML) INTRAVENOUS AS NEEDED
Status: DISCONTINUED | OUTPATIENT
Start: 2024-09-20 | End: 2024-09-20

## 2024-09-20 RX ORDER — SODIUM CHLORIDE, SODIUM LACTATE, POTASSIUM CHLORIDE, CALCIUM CHLORIDE 600; 310; 30; 20 MG/100ML; MG/100ML; MG/100ML; MG/100ML
INJECTION, SOLUTION INTRAVENOUS CONTINUOUS PRN
Status: DISCONTINUED | OUTPATIENT
Start: 2024-09-20 | End: 2024-09-20

## 2024-09-20 RX ORDER — ONDANSETRON HYDROCHLORIDE 2 MG/ML
INJECTION, SOLUTION INTRAVENOUS AS NEEDED
Status: DISCONTINUED | OUTPATIENT
Start: 2024-09-20 | End: 2024-09-20

## 2024-09-20 RX ORDER — VANCOMYCIN HYDROCHLORIDE 1 G/20ML
INJECTION, POWDER, LYOPHILIZED, FOR SOLUTION INTRAVENOUS AS NEEDED
Status: DISCONTINUED | OUTPATIENT
Start: 2024-09-20 | End: 2024-09-20 | Stop reason: HOSPADM

## 2024-09-20 RX ORDER — PROPOFOL 10 MG/ML
INJECTION, EMULSION INTRAVENOUS AS NEEDED
Status: DISCONTINUED | OUTPATIENT
Start: 2024-09-20 | End: 2024-09-20

## 2024-09-20 RX ORDER — SODIUM CHLORIDE, SODIUM LACTATE, POTASSIUM CHLORIDE, CALCIUM CHLORIDE 600; 310; 30; 20 MG/100ML; MG/100ML; MG/100ML; MG/100ML
100 INJECTION, SOLUTION INTRAVENOUS CONTINUOUS
Status: ACTIVE | OUTPATIENT
Start: 2024-09-20 | End: 2024-09-21

## 2024-09-20 RX ORDER — ROCURONIUM BROMIDE 10 MG/ML
INJECTION, SOLUTION INTRAVENOUS AS NEEDED
Status: DISCONTINUED | OUTPATIENT
Start: 2024-09-20 | End: 2024-09-20

## 2024-09-20 RX ORDER — NALOXONE HYDROCHLORIDE 0.4 MG/ML
0.2 INJECTION, SOLUTION INTRAMUSCULAR; INTRAVENOUS; SUBCUTANEOUS EVERY 5 MIN PRN
Status: DISCONTINUED | OUTPATIENT
Start: 2024-09-20 | End: 2024-09-25 | Stop reason: HOSPADM

## 2024-09-20 RX ORDER — ALBUMIN HUMAN 50 G/1000ML
SOLUTION INTRAVENOUS AS NEEDED
Status: DISCONTINUED | OUTPATIENT
Start: 2024-09-20 | End: 2024-09-20

## 2024-09-20 RX ORDER — CEFAZOLIN 1 G/1
INJECTION, POWDER, FOR SOLUTION INTRAVENOUS AS NEEDED
Status: DISCONTINUED | OUTPATIENT
Start: 2024-09-20 | End: 2024-09-20 | Stop reason: HOSPADM

## 2024-09-20 RX ORDER — LIDOCAINE HYDROCHLORIDE AND EPINEPHRINE 5; 5 MG/ML; UG/ML
INJECTION, SOLUTION INFILTRATION; PERINEURAL AS NEEDED
Status: DISCONTINUED | OUTPATIENT
Start: 2024-09-20 | End: 2024-09-20 | Stop reason: HOSPADM

## 2024-09-20 RX ORDER — SODIUM CHLORIDE 0.9 G/100ML
IRRIGANT IRRIGATION AS NEEDED
Status: DISCONTINUED | OUTPATIENT
Start: 2024-09-20 | End: 2024-09-20 | Stop reason: HOSPADM

## 2024-09-20 RX ORDER — ENOXAPARIN SODIUM 100 MG/ML
40 INJECTION SUBCUTANEOUS EVERY 24 HOURS
Status: DISCONTINUED | OUTPATIENT
Start: 2024-09-21 | End: 2024-09-25 | Stop reason: HOSPADM

## 2024-09-20 RX ORDER — CEFAZOLIN 1 G/1
INJECTION, POWDER, FOR SOLUTION INTRAVENOUS AS NEEDED
Status: DISCONTINUED | OUTPATIENT
Start: 2024-09-20 | End: 2024-09-20

## 2024-09-20 RX ORDER — OXYCODONE AND ACETAMINOPHEN 5; 325 MG/1; MG/1
1 TABLET ORAL EVERY 4 HOURS PRN
Status: DISCONTINUED | OUTPATIENT
Start: 2024-09-20 | End: 2024-09-20 | Stop reason: HOSPADM

## 2024-09-20 RX ORDER — ACETAMINOPHEN 325 MG/1
650 TABLET ORAL EVERY 6 HOURS
Status: DISCONTINUED | OUTPATIENT
Start: 2024-09-20 | End: 2024-09-25 | Stop reason: HOSPADM

## 2024-09-20 RX ORDER — DEXTROSE 50 % IN WATER (D50W) INTRAVENOUS SYRINGE
25
Status: DISCONTINUED | OUTPATIENT
Start: 2024-09-20 | End: 2024-09-25 | Stop reason: HOSPADM

## 2024-09-20 RX ORDER — ONDANSETRON HYDROCHLORIDE 2 MG/ML
4 INJECTION, SOLUTION INTRAVENOUS EVERY 8 HOURS PRN
Status: DISCONTINUED | OUTPATIENT
Start: 2024-09-20 | End: 2024-09-25 | Stop reason: HOSPADM

## 2024-09-20 RX ORDER — POTASSIUM CHLORIDE 14.9 MG/ML
INJECTION INTRAVENOUS AS NEEDED
Status: DISCONTINUED | OUTPATIENT
Start: 2024-09-20 | End: 2024-09-20

## 2024-09-20 RX ORDER — BISACODYL 5 MG
10 TABLET, DELAYED RELEASE (ENTERIC COATED) ORAL DAILY PRN
Status: DISCONTINUED | OUTPATIENT
Start: 2024-09-20 | End: 2024-09-24

## 2024-09-20 RX ORDER — GABAPENTIN 300 MG/1
600 CAPSULE ORAL NIGHTLY
Status: DISCONTINUED | OUTPATIENT
Start: 2024-09-20 | End: 2024-09-25 | Stop reason: HOSPADM

## 2024-09-20 RX ORDER — HYDROMORPHONE HYDROCHLORIDE 1 MG/ML
0.2 INJECTION, SOLUTION INTRAMUSCULAR; INTRAVENOUS; SUBCUTANEOUS EVERY 4 HOURS PRN
Status: DISCONTINUED | OUTPATIENT
Start: 2024-09-20 | End: 2024-09-23

## 2024-09-20 RX ORDER — LABETALOL HYDROCHLORIDE 5 MG/ML
5 INJECTION, SOLUTION INTRAVENOUS EVERY 10 MIN PRN
Status: DISCONTINUED | OUTPATIENT
Start: 2024-09-20 | End: 2024-09-20 | Stop reason: HOSPADM

## 2024-09-20 RX ORDER — HYDROMORPHONE HYDROCHLORIDE 1 MG/ML
0.5 INJECTION, SOLUTION INTRAMUSCULAR; INTRAVENOUS; SUBCUTANEOUS EVERY 5 MIN PRN
Status: DISCONTINUED | OUTPATIENT
Start: 2024-09-20 | End: 2024-09-20 | Stop reason: HOSPADM

## 2024-09-20 RX ORDER — PHENYLEPHRINE 10 MG/250 ML(40 MCG/ML)IN 0.9 % SOD.CHLORIDE INTRAVENOUS
CONTINUOUS PRN
Status: DISCONTINUED | OUTPATIENT
Start: 2024-09-20 | End: 2024-09-20

## 2024-09-20 RX ORDER — FENTANYL CITRATE 50 UG/ML
INJECTION, SOLUTION INTRAMUSCULAR; INTRAVENOUS AS NEEDED
Status: DISCONTINUED | OUTPATIENT
Start: 2024-09-20 | End: 2024-09-20

## 2024-09-20 RX ORDER — SODIUM CHLORIDE, SODIUM LACTATE, POTASSIUM CHLORIDE, CALCIUM CHLORIDE 600; 310; 30; 20 MG/100ML; MG/100ML; MG/100ML; MG/100ML
100 INJECTION, SOLUTION INTRAVENOUS CONTINUOUS
Status: DISCONTINUED | OUTPATIENT
Start: 2024-09-20 | End: 2024-09-20 | Stop reason: HOSPADM

## 2024-09-20 RX ORDER — FENTANYL CITRATE 50 UG/ML
12.5 INJECTION, SOLUTION INTRAMUSCULAR; INTRAVENOUS EVERY 5 MIN PRN
Status: DISCONTINUED | OUTPATIENT
Start: 2024-09-20 | End: 2024-09-20 | Stop reason: HOSPADM

## 2024-09-20 RX ORDER — LIDOCAINE HYDROCHLORIDE 20 MG/ML
INJECTION, SOLUTION INFILTRATION; PERINEURAL AS NEEDED
Status: DISCONTINUED | OUTPATIENT
Start: 2024-09-20 | End: 2024-09-20

## 2024-09-20 RX ORDER — METOCLOPRAMIDE HYDROCHLORIDE 5 MG/ML
10 INJECTION INTRAMUSCULAR; INTRAVENOUS ONCE AS NEEDED
Status: DISCONTINUED | OUTPATIENT
Start: 2024-09-20 | End: 2024-09-20 | Stop reason: HOSPADM

## 2024-09-20 RX ORDER — OXYCODONE HYDROCHLORIDE 5 MG/1
2.5 TABLET ORAL EVERY 4 HOURS PRN
Status: DISCONTINUED | OUTPATIENT
Start: 2024-09-20 | End: 2024-09-25 | Stop reason: HOSPADM

## 2024-09-20 RX ORDER — ALBUTEROL SULFATE 0.83 MG/ML
2.5 SOLUTION RESPIRATORY (INHALATION) ONCE AS NEEDED
Status: DISCONTINUED | OUTPATIENT
Start: 2024-09-20 | End: 2024-09-20 | Stop reason: HOSPADM

## 2024-09-20 RX ORDER — POLYETHYLENE GLYCOL 3350 17 G/17G
17 POWDER, FOR SOLUTION ORAL 2 TIMES DAILY
Status: DISCONTINUED | OUTPATIENT
Start: 2024-09-20 | End: 2024-09-24

## 2024-09-20 RX ORDER — OXYCODONE HYDROCHLORIDE 5 MG/1
10 TABLET ORAL EVERY 4 HOURS PRN
Status: DISCONTINUED | OUTPATIENT
Start: 2024-09-20 | End: 2024-09-20 | Stop reason: HOSPADM

## 2024-09-20 RX ORDER — VASOPRESSIN 20 U/ML
INJECTION PARENTERAL AS NEEDED
Status: DISCONTINUED | OUTPATIENT
Start: 2024-09-20 | End: 2024-09-20

## 2024-09-20 RX ORDER — DROPERIDOL 2.5 MG/ML
0.62 INJECTION, SOLUTION INTRAMUSCULAR; INTRAVENOUS ONCE AS NEEDED
Status: DISCONTINUED | OUTPATIENT
Start: 2024-09-20 | End: 2024-09-20 | Stop reason: HOSPADM

## 2024-09-20 RX ORDER — HYDROMORPHONE HYDROCHLORIDE 1 MG/ML
0.2 INJECTION, SOLUTION INTRAMUSCULAR; INTRAVENOUS; SUBCUTANEOUS EVERY 5 MIN PRN
Status: DISCONTINUED | OUTPATIENT
Start: 2024-09-20 | End: 2024-09-20 | Stop reason: HOSPADM

## 2024-09-20 RX ORDER — DEXTROSE 50 % IN WATER (D50W) INTRAVENOUS SYRINGE
12.5
Status: DISCONTINUED | OUTPATIENT
Start: 2024-09-20 | End: 2024-09-25 | Stop reason: HOSPADM

## 2024-09-20 RX ORDER — OXYCODONE HYDROCHLORIDE 10 MG/1
10 TABLET ORAL EVERY 4 HOURS PRN
Status: DISCONTINUED | OUTPATIENT
Start: 2024-09-20 | End: 2024-09-25 | Stop reason: HOSPADM

## 2024-09-20 RX ORDER — POLYMYXIN B 500000 [USP'U]/1
INJECTION, POWDER, LYOPHILIZED, FOR SOLUTION INTRAMUSCULAR; INTRATHECAL; INTRAVENOUS; OPHTHALMIC AS NEEDED
Status: DISCONTINUED | OUTPATIENT
Start: 2024-09-20 | End: 2024-09-20 | Stop reason: HOSPADM

## 2024-09-20 RX ORDER — OXYCODONE HYDROCHLORIDE 5 MG/1
5 TABLET ORAL EVERY 4 HOURS PRN
Status: DISCONTINUED | OUTPATIENT
Start: 2024-09-20 | End: 2024-09-25 | Stop reason: HOSPADM

## 2024-09-20 RX ADMIN — OXYCODONE HYDROCHLORIDE 10 MG: 10 TABLET ORAL at 18:29

## 2024-09-20 RX ADMIN — GABAPENTIN 600 MG: 300 CAPSULE ORAL at 20:36

## 2024-09-20 RX ADMIN — ACETAMINOPHEN 650 MG: 325 TABLET ORAL at 23:46

## 2024-09-20 RX ADMIN — SODIUM CHLORIDE, POTASSIUM CHLORIDE, SODIUM LACTATE AND CALCIUM CHLORIDE 100 ML/HR: 600; 310; 30; 20 INJECTION, SOLUTION INTRAVENOUS at 18:29

## 2024-09-20 RX ADMIN — ENOXAPARIN SODIUM 40 MG: 40 INJECTION SUBCUTANEOUS at 23:47

## 2024-09-20 RX ADMIN — CYCLOBENZAPRINE 5 MG: 10 TABLET, FILM COATED ORAL at 20:36

## 2024-09-20 RX ADMIN — POLYETHYLENE GLYCOL 3350 17 G: 17 POWDER, FOR SOLUTION ORAL at 20:37

## 2024-09-20 RX ADMIN — ACETAMINOPHEN 650 MG: 325 TABLET ORAL at 18:29

## 2024-09-20 SDOH — HEALTH STABILITY: MENTAL HEALTH: CURRENT SMOKER: 0

## 2024-09-20 ASSESSMENT — COGNITIVE AND FUNCTIONAL STATUS - GENERAL
MOVING TO AND FROM BED TO CHAIR: A LITTLE
PERSONAL GROOMING: A LITTLE
DAILY ACTIVITIY SCORE: 19
MOBILITY SCORE: 18
TURNING FROM BACK TO SIDE WHILE IN FLAT BAD: A LITTLE
CLIMB 3 TO 5 STEPS WITH RAILING: A LOT
WALKING IN HOSPITAL ROOM: A LITTLE
DRESSING REGULAR UPPER BODY CLOTHING: A LITTLE
DRESSING REGULAR LOWER BODY CLOTHING: A LITTLE
TOILETING: A LITTLE
HELP NEEDED FOR BATHING: A LITTLE
STANDING UP FROM CHAIR USING ARMS: A LITTLE

## 2024-09-20 ASSESSMENT — PAIN SCALES - GENERAL
PAINLEVEL_OUTOF10: 0 - NO PAIN
PAINLEVEL_OUTOF10: 4
PAINLEVEL_OUTOF10: 7
PAIN_LEVEL: 0
PAINLEVEL_OUTOF10: 5 - MODERATE PAIN
PAINLEVEL_OUTOF10: 0 - NO PAIN
PAINLEVEL_OUTOF10: 5 - MODERATE PAIN
PAINLEVEL_OUTOF10: 4
PAINLEVEL_OUTOF10: 5 - MODERATE PAIN
PAINLEVEL_OUTOF10: 0 - NO PAIN

## 2024-09-20 ASSESSMENT — PAIN DESCRIPTION - LOCATION: LOCATION: BACK

## 2024-09-20 ASSESSMENT — COLUMBIA-SUICIDE SEVERITY RATING SCALE - C-SSRS
1. IN THE PAST MONTH, HAVE YOU WISHED YOU WERE DEAD OR WISHED YOU COULD GO TO SLEEP AND NOT WAKE UP?: NO
6. HAVE YOU EVER DONE ANYTHING, STARTED TO DO ANYTHING, OR PREPARED TO DO ANYTHING TO END YOUR LIFE?: NO
2. HAVE YOU ACTUALLY HAD ANY THOUGHTS OF KILLING YOURSELF?: NO

## 2024-09-20 NOTE — ANESTHESIA PREPROCEDURE EVALUATION
Patient: Janelle Pitt    Procedure Information       Date/Time: 09/20/24 0645    Procedure: OPEN SURGERY - L1-L2 3 L3-4 L4-5 laminectomy and facetectomy for decompression L5-S1 laminectomy and facetectomy for decompression with L5-S1 transforaminal lumbar interbody fusion with cage placement and T12-S1 instrumentation with fusion using autograft and BMP and pelvic fixation. (Bilateral) - Please add [id number] for Decompression at L5-S1    Location: [facility] 24 / [facility]    Surgeons: Jeff Barajas MD            Relevant Problems   Anesthesia (within normal limits)      Cardiac   (+) HTN (hypertension)   (+) Hyperlipidemia   (-) Peripheral vascular disease (CMS-HCC) (Pt denies)      Pulmonary (within normal limits)      Neuro  With Left drop foot   (+) Anxiety   (+) Chronic lumbar radiculopathy   (+) Depression   (+) Sciatica of left side      GI (within normal limits)      /Renal (within normal limits)      Liver (within normal limits)      Endocrine   (+) Type 2 diabetes mellitus, without long-term current use of insulin (Multi) (Borderline hyperglycemia only, not actually diabetic)      Hematology (within normal limits)      Musculoskeletal  Severe spinal stenosis with sciatica/Left foot drop   (+) Chronic low back pain   (+) Chronic pain syndrome   (+) Lumbar disc disease   (+) Lumbar foraminal stenosis   (+) Lumbar stenosis with neurogenic claudication   (+) Primary osteoarthritis of left hip (With osteoporosis  L hip pain, R SI joint pain, s/p R THR (2022))   (+) Scoliosis of lumbar spine   (+) Scoliosis of thoracolumbar region due to degenerative disease of spine in adult      HEENT (within normal limits)      ID (within normal limits)      Skin (within normal limits)      GYN (within normal limits)       Clinical information reviewed:   Tobacco  Allergies  Meds   Med Hx  Surg Hx   Fam Hx  Soc Hx        NPO Detail:  NPO/Void Status  Date of Last Liquid: 09/19/24  Time of Last Liquid:  2300  Date of Last Solid: 09/19/24  Time of Last Solid: 1700  Time of Last Void: 0300         Physical Exam    Airway  Mallampati: II  TM distance: >3 FB  Neck ROM: full     Cardiovascular - normal exam  Rhythm: regular  Rate: normal     Dental   (+) implants     Pulmonary - normal exam     Abdominal - normal exam  Abdomen: soft  Bowel sounds: normal     Other findings: Few scattered implants in mouth, all dentition solid/none loose per patient          Anesthesia Plan    History of general anesthesia?: yes  History of complications of general anesthesia?: no    ASA 3     general   (Plan GETA/PIV x2/A-line -- surgeons plan no monitoring, may have immediate post-op vent if airway edema present after extended prone case)  The patient is not a current smoker.  Patient was previously instructed to abstain from smoking on day of procedure.  Patient did not smoke on day of procedure.  Education provided regarding risk of obstructive sleep apnea.  intravenous induction   Postoperative administration of opioids is intended.  Trial extubation is planned.  Anesthetic plan and risks discussed with patient.  Use of blood products discussed with patient who consented to blood products.    Plan discussed with attending and resident.

## 2024-09-20 NOTE — ANESTHESIA PROCEDURE NOTES
Central Venous Line:    Date/Time: 9/20/2024 8:20 AM    A central venous line was placed in the OR for the following indication(s): central venous access.  Staffing  Performed: resident   Authorized by: Umesh Chen MD    Performed by: Kirsty Holguin DO    Sterility preparation included the following: provider hand hygiene performed prior to central venous catheter insertion, all 5 sterile barriers used (gloves, gown, cap, mask, large sterile drape) during central venous catheter insertion, antiseptic used during central venous catheter insertion and skin prep agent completely dried prior to procedure.  The patient was placed in Trendelenburg position.    Right internal jugular vein was prepped.    The site was prepped with Chlorhexidine.  Size: 9 Fr   Length: 11.5  Catheter type: introducer   Number of Lumens: double lumen    This catheter was not an oximetric catheter.    During the procedure, the following specific steps were taken: target vein identified, needle advanced into vein and blood aspirated and guidewire advanced into vein.  Seldinger technique used.  Procedure performed using ultrasound guidance.  Sterile gel and probe cover used in ultrasound-guided central venous catheter insertion.    Intravenous verification was obtained by ultrasound, venous blood return and transducer.      Post insertion care included: all ports aspirated, all ports flushed easily, guidewire removed intact, Biopatch applied, line sutured in place and dressing applied.    During the procedure the patient experienced: patient tolerated procedure well with no complications.          Additional notes:  Central line insertion performed easily/atraumatically, no complications noted.

## 2024-09-20 NOTE — ANESTHESIA PROCEDURE NOTES
Airway  Date/Time: 9/20/2024 7:59 AM  Urgency: elective    Airway not difficult    Staffing  Performed: resident   Authorized by: Umesh Chen MD    Performed by: Kirsty Holguin DO  Patient location during procedure: OR    Indications and Patient Condition  Indications for airway management: anesthesia and airway protection  Spontaneous Ventilation: absent  Sedation level: deep  Preoxygenated: yes  Patient position: sniffing  Mask difficulty assessment: 1 - vent by mask  Planned trial extubation    Final Airway Details  Final airway type: endotracheal airway      Successful airway: ETT  Cuffed: yes   Successful intubation technique: direct laryngoscopy  Facilitating devices/methods: intubating stylet  Endotracheal tube insertion site: oral  Blade: Bere  Blade size: #3  ETT size (mm): 7.5  Cormack-Lehane Classification: grade I - full view of glottis  Placement verified by: chest auscultation and capnometry   Measured from: teeth  ETT to teeth (cm): 21  Number of attempts at approach: 1  Ventilation between attempts: none  Number of other approaches attempted: 0

## 2024-09-20 NOTE — ANESTHESIA POSTPROCEDURE EVALUATION
Patient: Janelle Pitt    Procedure Summary       Date: 09/20/24 Room / Location: [facility] OR 24 / [facility] OR    Anesthesia Start: 0753 Anesthesia Stop: 1421    Procedure: OPEN SURGERY - L1-L2 3 L3-4 L4-5 laminectomy and facetectomy for decompression L5-S1 laminectomy and facetectomy for decompression with L5-S1 transforaminal lumbar interbody fusion with cage placement and T12-S1 instrumentation with fusion using autograft and BMP and pelvic fixation. (Bilateral) Diagnosis:       Left foot drop      Lumbar foraminal stenosis      Lumbar radiculopathy, chronic      Scoliosis of thoracolumbar region due to degenerative disease of spine in adult      (Left foot drop [M21.372])      (Lumbar foraminal stenosis [M48.061])      (Lumbar radiculopathy, chronic [M54.16])      (Scoliosis of thoracolumbar region due to degenerative disease of spine in adult [M41.55])    Surgeons: Jeff Barajas MD Responsible Provider: Umesh Chen MD    Anesthesia Type: general ASA Status: 3            Anesthesia Type: general    Vitals Value Taken Time   /61 09/20/24 1418   Temp 98.4 09/20/24 1424   Pulse 91 09/20/24 1422   Resp 25 09/20/24 1422   SpO2 99 % 09/20/24 1422   Vitals shown include unfiled device data.    Anesthesia Post Evaluation    Patient location during evaluation: PACU  Patient participation: complete - patient participated  Level of consciousness: sleepy but conscious  Pain management: adequate  Airway patency: patent  Cardiovascular status: acceptable  Respiratory status: acceptable  Hydration status: acceptable  Postoperative Nausea and Vomiting: none        No notable events documented.

## 2024-09-20 NOTE — HOSPITAL COURSE
Janelle Pitt is a 69 y.o. female with a past medical history of diverticulosis, HLD, osteoporosis and PAD who presented with back pain and LLE radiculopathy with foot drop.   Patient taken to the OR on 9/20 for L1-pelvic fusion, L3/4, L4/5 PCO, L5/S1 TLIF. She was admitted to the neurosurgery service post operatively.     9/21 Hbg drop to 6.4 s/p 2unit PRBC.   9/22 Hypotensive episode with desat to 90%, CXR with RLL opacification, EKG NSR with occasional PVCs. Surgical drain auto-dc'd.   9/23 Patient weaned off O2 with no further hypotension.     PT/OT evaluated patient and recommended placement at acute rehab at time of discharge; patient requesting to go home with Home Care.     Patient discharged with detailed instructions and scheduled outpatient follow up.

## 2024-09-20 NOTE — ANESTHESIA PROCEDURE NOTES
Arterial Line:    Date/Time: 9/20/2024 8:16 AM    Staffing  Performed: attending   Authorized by: Umesh Chen MD    Performed by: Umesh Chen MD    An arterial line was placed. Procedure performed using surface landmarks.in the OR for the following indication(s): continuous blood pressure monitoring and blood sampling needed.    A 20 gauge (size), 1 and 3/4 inch (length), Angiocath (type) catheter was placed into the Right radial artery, secured by Tegaderm,   Seldinger technique used.  Events:  patient tolerated procedure well with no complications.      Additional notes:  Arterial line inserted easily/atraumatically, no complications noted.

## 2024-09-20 NOTE — OP NOTE
OPEN SURGERY - L1-L2 3 L3-4 L4-5 laminectomy and facetectomy for decompression L5-S1 laminectomy and facetectomy for decompression with L5-S1 transforaminal lumbar interbody fusion with cage placement and T12-S1 instrumentation with fusion using autograft and BMP and pelvic fixation. (B) Operative Note     Date: 2024  OR Location: [facility] OR    Name: Janelle Pitt, : 1955, Age: 69 y.o., MRN: [id number], Sex: female    Diagnosis  Pre-op Diagnosis      * Left foot drop [M21.372]     * Lumbar foraminal stenosis [M48.061]     * Lumbar radiculopathy, chronic [M54.16]     * Scoliosis of thoracolumbar region due to degenerative disease of spine in adult [M41.55] Post-op Diagnosis     * Left foot drop [M21.372]     * Lumbar foraminal stenosis [M48.061]     * Lumbar radiculopathy, chronic [M54.16]     * Scoliosis of thoracolumbar region due to degenerative disease of spine in adult [M41.55]     OPERATION/PROCEDURE:    L5-S1 laminectomy and facetectomies for decompression of spinal nerve roots  L5-S1 Combined posterior interbody and posterolateral arthrodesis   L5-S1 placement of expandable titanium ntervertebral biomechanical device   L3-4 L4-5 laminectomy and facetectomy for decompression of L3, L4  and L5 nerve roots.  Placement of posterior segmental instrumentation L1-S1   Placement of pelvic Fixation   Posterolateral instrumented arthrodesis, L1-2, L2-3, L3-4, L4-5     Use of local morcellized autograft   Use of osteopromotive material for spine surgery  Use of intraoperative image-guided computer-assisted navigation.       SURGEON:    Jeff Barajas MD    RESIDENT:  Renard Worrell MD    MEDICATIONS:    Antibiotic prophylaxis given within one hour prior to skin incision.    PROPHYLAXIS:    Venous thromboembolism prophylaxis was ordered at the time of surgery in the form of mechanical prophylaxis using sequential compression devices.    INDICATION:  Ms. Pitt is a 69 y.o. female who presented with  intractable low back and lower extremity symptoms with good correlation between the imaging studies and clinical signs and symptoms.  She had a significant foot drop on the left side.  The patient failed all conservative treatment. In view of the presence of significant symptoms affecting the activities of daily living to a significant extent,  surgical treatment was discussed with the goals, risk and benefits of surgery and the fact that surgery may or may not alleviate the symptoms completely with small chances of even worsening of the symptoms.  The patient chose to proceed with surgery after clear understanding of all the risk and benefits and goals of surgery.    TECHNIQUE:    After induction of general anesthesia, the patient was carefully turned prone on a Jj table and all dependent portions were carefully padded. The thoracolumbar region was scrubbed, prepped, and draped in the usual sterile fashion.  A midline skin incision was marked out from L1-S2 based on lateral fluoroscopy. This was injected with local anesthesia and carried out sharply. Subcutaneous tissue divided using monopolar cautery over the tips of the spinous process and carried out all the way to the tip of the transverse processes of L1 through L5 and over the sacral ala and dorsal sacrum bilaterally including upto S2 level. Spinous process clamp was attached to the L1 spinous process and the dynamic reference array was attached to this. The O-arm was brought into the field and intraoperative CT scan performed and the images transferred to the [employer] system for use in intraoperative image-guided computer-assisted navigation. The navigation system was then used to place the instrumentation at the bilateral pedicles from L1 to S1 using RELINE OPEN instrumented system. Placement of iliac fixation bilaterally was done using the S2 alar iliac trajectory crossing the sacroiliac joints.    Attention was then directed to performance  of the TLIF. The inferior edge of the lamina along with lateral border of the pars along with the L5-S1 facet joint  identified.  Hemiaminectomy and facetectomy on the right removing the inferior facet of L5 and superior facet of S1 was performed using drill, osteotomes and kerrison rongeurs to decompress the nerves roots. Ligamentum flavum was resected thereby decompressing the neural elements from the L5 pedicle to S1 pedicle including the central canal and decompression of the other foramen.  I the decompression of the neural elements performed and the degree of bony and ligament removal necessary for the same was beyond and significantly more than that would otherwise be necessary to access the interbody space for interbody arthrodesis at the level.   The disk space was accessed and incised sharply bilaterally. Using a series of curettes and pituitary rongeurs, an aggressive and complete discectomy was performed. After completing the diskectomy, the endplates of L5 and S1 were prepared for interbody arthrodesis. The wound was irrigated and hemostasis achieved. The disk space was first packed with BMP and local morcellized autograft from the decompression as mentioned above.  A static lordotic  titanium cage was packed with local autograft and inserted into the intervertebral space under fluoroscopy. AP and lateral fluoroscopy demonstrated the cage to be in excellent position.  At this point L3-4 and L4-5 laminectomy and complete bilateral facetectomies were then performed for thorough  central and bilateral foraminal decompression for decompressions of L3-L4 and L5 nerve roots bilaterally   The posterolateral elements of L1 through S1 were decorticated using a high-speed drill. The rods were seated in the heads of the screws and through a combination of benja translation, cantilever and compression and distraction across multiple segments with excellent restoration of both coronal and sagittal alignment . The  pre-bent custom made UNiD rods from [employer] were utilized . The rods were secured in place using set caps and final tightened using the torque  and counter-torque.An accessory benja was placed on the right across the LS junction for added stability. Final AP and lateral fluoroscopic images were taken to confirm satisfactory implant placement. The posterolateral arthrodesis from L1 - S1 was completed by laying down morselized autograft that was obtained from the bony decompression and BMP that is an osteopromotive material for spine surgery. A gram of vancomycin and ancef powder was scattered about the wound and Hemovac drain tunneled out percutaneously from the wound. The wound was then closed in layers with use of staples for the skin. The drapes were removed, sterile dressing applied. The patient was turned back supine.  The EBL was about 600 ml    Complications:  None; patient tolerated the procedure well.    Disposition: PACU - hemodynamically stable.  Condition: stable         Jeff Barajas  Phone Number: [phone]

## 2024-09-20 NOTE — ANESTHESIA POSTPROCEDURE EVALUATION
Patient: Janelle Pitt    Procedure Summary       Date: 09/20/24 Room / Location: [facility] OR 24 / [facility] OR    Anesthesia Start: 0753 Anesthesia Stop: 1421    Procedure: OPEN SURGERY - L1-L2 3 L3-4 L4-5 laminectomy and facetectomy for decompression L5-S1 laminectomy and facetectomy for decompression with L5-S1 transforaminal lumbar interbody fusion with cage placement and T12-S1 instrumentation with fusion using autograft and BMP and pelvic fixation. (Bilateral) Diagnosis:       Left foot drop      Lumbar foraminal stenosis      Lumbar radiculopathy, chronic      Scoliosis of thoracolumbar region due to degenerative disease of spine in adult      (Left foot drop [M21.372])      (Lumbar foraminal stenosis [M48.061])      (Lumbar radiculopathy, chronic [M54.16])      (Scoliosis of thoracolumbar region due to degenerative disease of spine in adult [M41.55])    Surgeons: Jeff Barajas MD Responsible Provider: Umesh Chen MD    Anesthesia Type: general ASA Status: 3            Anesthesia Type: general    Vitals Value Taken Time   /61 09/20/24 1418   Temp 36.3 09/20/24 1425   Pulse 91 09/20/24 1422   Resp 25 09/20/24 1422   SpO2 99 % 09/20/24 1422   Vitals shown include unfiled device data.    Anesthesia Post Evaluation    Patient location during evaluation: PACU  Patient participation: complete - patient participated  Level of consciousness: awake and alert  Pain score: 0  Pain management: adequate  Multimodal analgesia pain management approach  Airway patency: patent  Two or more strategies used to mitigate risk of obstructive sleep apnea  Cardiovascular status: acceptable  Respiratory status: acceptable  Hydration status: acceptable  Postoperative Nausea and Vomiting: none        There were no known notable events for this encounter.

## 2024-09-21 ENCOUNTER — APPOINTMENT (OUTPATIENT)
Dept: RADIOLOGY | Facility: HOSPITAL | Age: 69
DRG: 454 | End: 2024-09-21
Payer: MEDICARE

## 2024-09-21 LAB
ABO GROUP (TYPE) IN BLOOD: NORMAL
ALBUMIN SERPL BCP-MCNC: 3.1 G/DL (ref 3.4–5)
ANION GAP SERPL CALC-SCNC: 10 MMOL/L (ref 10–20)
ANION GAP SERPL CALC-SCNC: 9 MMOL/L (ref 10–20)
ANTIBODY SCREEN: NORMAL
BUN SERPL-MCNC: 5 MG/DL (ref 6–23)
BUN SERPL-MCNC: 6 MG/DL (ref 6–23)
CALCIUM SERPL-MCNC: 7.7 MG/DL (ref 8.6–10.6)
CALCIUM SERPL-MCNC: 7.9 MG/DL (ref 8.6–10.6)
CHLORIDE SERPL-SCNC: 106 MMOL/L (ref 98–107)
CHLORIDE SERPL-SCNC: 106 MMOL/L (ref 98–107)
CO2 SERPL-SCNC: 26 MMOL/L (ref 21–32)
CO2 SERPL-SCNC: 26 MMOL/L (ref 21–32)
CREAT SERPL-MCNC: 0.55 MG/DL (ref 0.5–1.05)
CREAT SERPL-MCNC: 0.6 MG/DL (ref 0.5–1.05)
EGFRCR SERPLBLD CKD-EPI 2021: >90 ML/MIN/1.73M*2
EGFRCR SERPLBLD CKD-EPI 2021: >90 ML/MIN/1.73M*2
ERYTHROCYTE [DISTWIDTH] IN BLOOD BY AUTOMATED COUNT: 14.5 % (ref 11.5–14.5)
ERYTHROCYTE [DISTWIDTH] IN BLOOD BY AUTOMATED COUNT: 18.3 % (ref 11.5–14.5)
ERYTHROCYTE [DISTWIDTH] IN BLOOD BY AUTOMATED COUNT: 18.5 % (ref 11.5–14.5)
GLUCOSE SERPL-MCNC: 102 MG/DL (ref 74–99)
GLUCOSE SERPL-MCNC: 111 MG/DL (ref 74–99)
HCT VFR BLD AUTO: 19.4 % (ref 36–46)
HCT VFR BLD AUTO: 26.6 % (ref 36–46)
HCT VFR BLD AUTO: 30.6 % (ref 36–46)
HGB BLD-MCNC: 6.4 G/DL (ref 12–16)
HGB BLD-MCNC: 9.1 G/DL (ref 12–16)
HGB BLD-MCNC: 9.9 G/DL (ref 12–16)
MCH RBC QN AUTO: 30.2 PG (ref 26–34)
MCH RBC QN AUTO: 30.6 PG (ref 26–34)
MCH RBC QN AUTO: 32.8 PG (ref 26–34)
MCHC RBC AUTO-ENTMCNC: 32.4 G/DL (ref 32–36)
MCHC RBC AUTO-ENTMCNC: 33 G/DL (ref 32–36)
MCHC RBC AUTO-ENTMCNC: 34.2 G/DL (ref 32–36)
MCV RBC AUTO: 100 FL (ref 80–100)
MCV RBC AUTO: 90 FL (ref 80–100)
MCV RBC AUTO: 93 FL (ref 80–100)
NRBC BLD-RTO: 0 /100 WBCS (ref 0–0)
PHOSPHATE SERPL-MCNC: 2.2 MG/DL (ref 2.5–4.9)
PLATELET # BLD AUTO: 134 X10*3/UL (ref 150–450)
PLATELET # BLD AUTO: 141 X10*3/UL (ref 150–450)
PLATELET # BLD AUTO: 162 X10*3/UL (ref 150–450)
POTASSIUM SERPL-SCNC: 3.3 MMOL/L (ref 3.5–5.3)
POTASSIUM SERPL-SCNC: 4 MMOL/L (ref 3.5–5.3)
RBC # BLD AUTO: 1.95 X10*6/UL (ref 4–5.2)
RBC # BLD AUTO: 2.97 X10*6/UL (ref 4–5.2)
RBC # BLD AUTO: 3.28 X10*6/UL (ref 4–5.2)
RH FACTOR (ANTIGEN D): NORMAL
SODIUM SERPL-SCNC: 138 MMOL/L (ref 136–145)
SODIUM SERPL-SCNC: 138 MMOL/L (ref 136–145)
WBC # BLD AUTO: 6.8 X10*3/UL (ref 4.4–11.3)
WBC # BLD AUTO: 8.3 X10*3/UL (ref 4.4–11.3)
WBC # BLD AUTO: 9.5 X10*3/UL (ref 4.4–11.3)

## 2024-09-21 PROCEDURE — 36430 TRANSFUSION BLD/BLD COMPNT: CPT

## 2024-09-21 PROCEDURE — 36415 COLL VENOUS BLD VENIPUNCTURE: CPT

## 2024-09-21 PROCEDURE — 85027 COMPLETE CBC AUTOMATED: CPT

## 2024-09-21 PROCEDURE — 83735 ASSAY OF MAGNESIUM: CPT | Performed by: NEUROLOGICAL SURGERY

## 2024-09-21 PROCEDURE — 84132 ASSAY OF SERUM POTASSIUM: CPT

## 2024-09-21 PROCEDURE — 2500000001 HC RX 250 WO HCPCS SELF ADMINISTERED DRUGS (ALT 637 FOR MEDICARE OP)

## 2024-09-21 PROCEDURE — 71045 X-RAY EXAM CHEST 1 VIEW: CPT | Performed by: RADIOLOGY

## 2024-09-21 PROCEDURE — 2500000005 HC RX 250 GENERAL PHARMACY W/O HCPCS: Performed by: NEUROLOGICAL SURGERY

## 2024-09-21 PROCEDURE — P9016 RBC LEUKOCYTES REDUCED: HCPCS

## 2024-09-21 PROCEDURE — 80048 BASIC METABOLIC PNL TOTAL CA: CPT | Mod: CCI

## 2024-09-21 PROCEDURE — 1200000002 HC GENERAL ROOM WITH TELEMETRY DAILY

## 2024-09-21 PROCEDURE — 80048 BASIC METABOLIC PNL TOTAL CA: CPT | Performed by: NEUROLOGICAL SURGERY

## 2024-09-21 PROCEDURE — 2500000004 HC RX 250 GENERAL PHARMACY W/ HCPCS (ALT 636 FOR OP/ED): Performed by: NEUROLOGICAL SURGERY

## 2024-09-21 PROCEDURE — 71045 X-RAY EXAM CHEST 1 VIEW: CPT

## 2024-09-21 PROCEDURE — 2500000004 HC RX 250 GENERAL PHARMACY W/ HCPCS (ALT 636 FOR OP/ED)

## 2024-09-21 PROCEDURE — 36415 COLL VENOUS BLD VENIPUNCTURE: CPT | Performed by: NEUROLOGICAL SURGERY

## 2024-09-21 PROCEDURE — 2500000001 HC RX 250 WO HCPCS SELF ADMINISTERED DRUGS (ALT 637 FOR MEDICARE OP): Performed by: NEUROLOGICAL SURGERY

## 2024-09-21 PROCEDURE — 85027 COMPLETE CBC AUTOMATED: CPT | Performed by: NEUROLOGICAL SURGERY

## 2024-09-21 PROCEDURE — 86901 BLOOD TYPING SEROLOGIC RH(D): CPT

## 2024-09-21 PROCEDURE — 36600 WITHDRAWAL OF ARTERIAL BLOOD: CPT

## 2024-09-21 RX ORDER — ACETAMINOPHEN 500 MG
5 TABLET ORAL NIGHTLY
Status: DISCONTINUED | OUTPATIENT
Start: 2024-09-21 | End: 2024-09-25 | Stop reason: HOSPADM

## 2024-09-21 RX ADMIN — OXYCODONE HYDROCHLORIDE 5 MG: 5 TABLET ORAL at 04:36

## 2024-09-21 RX ADMIN — SODIUM CHLORIDE, POTASSIUM CHLORIDE, SODIUM LACTATE AND CALCIUM CHLORIDE 500 ML: 600; 310; 30; 20 INJECTION, SOLUTION INTRAVENOUS at 23:16

## 2024-09-21 RX ADMIN — SODIUM CHLORIDE 1000 ML: 9 INJECTION, SOLUTION INTRAVENOUS at 09:41

## 2024-09-21 RX ADMIN — ACETAMINOPHEN 650 MG: 325 TABLET ORAL at 18:39

## 2024-09-21 RX ADMIN — CYCLOBENZAPRINE 5 MG: 10 TABLET, FILM COATED ORAL at 22:24

## 2024-09-21 RX ADMIN — ACETAMINOPHEN 650 MG: 325 TABLET ORAL at 12:42

## 2024-09-21 RX ADMIN — MELATONIN TAB 5 MG 5 MG: 5 TAB at 01:38

## 2024-09-21 RX ADMIN — Medication 2 L/MIN: at 21:55

## 2024-09-21 RX ADMIN — OXYCODONE HYDROCHLORIDE 5 MG: 5 TABLET ORAL at 16:28

## 2024-09-21 RX ADMIN — CYCLOBENZAPRINE 5 MG: 10 TABLET, FILM COATED ORAL at 08:03

## 2024-09-21 RX ADMIN — ACETAMINOPHEN 650 MG: 325 TABLET ORAL at 06:50

## 2024-09-21 RX ADMIN — GABAPENTIN 600 MG: 300 CAPSULE ORAL at 22:23

## 2024-09-21 RX ADMIN — OXYCODONE HYDROCHLORIDE 10 MG: 10 TABLET ORAL at 12:44

## 2024-09-21 RX ADMIN — SODIUM CHLORIDE, POTASSIUM CHLORIDE, SODIUM LACTATE AND CALCIUM CHLORIDE 100 ML/HR: 600; 310; 30; 20 INJECTION, SOLUTION INTRAVENOUS at 04:39

## 2024-09-21 RX ADMIN — CYCLOBENZAPRINE 5 MG: 10 TABLET, FILM COATED ORAL at 15:06

## 2024-09-21 ASSESSMENT — PAIN SCALES - GENERAL
PAINLEVEL_OUTOF10: 9
PAINLEVEL_OUTOF10: 0 - NO PAIN
PAINLEVEL_OUTOF10: 5 - MODERATE PAIN
PAINLEVEL_OUTOF10: 7
PAINLEVEL_OUTOF10: 3
PAINLEVEL_OUTOF10: 2

## 2024-09-21 ASSESSMENT — COGNITIVE AND FUNCTIONAL STATUS - GENERAL
PERSONAL GROOMING: A LITTLE
STANDING UP FROM CHAIR USING ARMS: A LITTLE
MOBILITY SCORE: 18
WALKING IN HOSPITAL ROOM: A LITTLE
DRESSING REGULAR UPPER BODY CLOTHING: A LITTLE
CLIMB 3 TO 5 STEPS WITH RAILING: A LOT
TOILETING: A LITTLE
HELP NEEDED FOR BATHING: A LITTLE
DAILY ACTIVITIY SCORE: 19
MOVING TO AND FROM BED TO CHAIR: A LITTLE
DRESSING REGULAR LOWER BODY CLOTHING: A LITTLE
TURNING FROM BACK TO SIDE WHILE IN FLAT BAD: A LITTLE

## 2024-09-21 ASSESSMENT — PAIN DESCRIPTION - DESCRIPTORS: DESCRIPTORS: RADIATING

## 2024-09-21 ASSESSMENT — PAIN - FUNCTIONAL ASSESSMENT
PAIN_FUNCTIONAL_ASSESSMENT: 0-10

## 2024-09-21 NOTE — PROGRESS NOTES
"Janelle Pitt is a 69 y.o. female on day 1 of admission presenting with Left foot drop.    Subjective   No acute events    Objective     Physical Exam  LLE DF3 o/w 5/5    Last Recorded Vitals  Blood pressure 101/64, pulse 84, temperature 36.5 °C (97.7 °F), temperature source Temporal, resp. rate 11, height 1.549 m (5' 1\"), weight 67.6 kg (149 lb), SpO2 93%.  Intake/Output last 3 Shifts:  I/O last 3 completed shifts:  In: 2528.3 (37.4 mL/kg) [P.O.:120; I.V.:2358.3 (34.9 mL/kg); IV Piggyback:50]  Out: 1670 (24.7 mL/kg) [Urine:795 (0.3 mL/kg/hr); Blood:600]  Weight: 67.6 kg     Relevant Results    Assessment/Plan   Principal Problem:    Left foot drop  Active Problems:    Hyperlipidemia    Chronic low back pain    Anxiety    Depression    Chronic pain syndrome    Lumbar disc disease    HTN (hypertension)    Type 2 diabetes mellitus, without long-term current use of insulin (Multi)    Primary osteoarthritis of left hip    Lumbar foraminal stenosis    Chronic lumbar radiculopathy    Scoliosis of thoracolumbar region due to degenerative disease of spine in adult      Patient is a 69 year old female w/ h/o diverticulosis, HLD, osteoporosis, PAD, who presented with back pain and LLE radiculopathy with a foot drop    9/20 s/p L1-pelvic fusion, L3/4, L4/5 PCO, L5/S1 TLIF    PLAN  Floor  Continue drain  Orthotics for AFO  PTOT  Scds, LVX      Nadine Angel MD      "

## 2024-09-21 NOTE — PROGRESS NOTES
Occupational Therapy                 Therapy Communication Note    Patient Name: Janelle Pitt  MRN: [id number]  Department: [place] [id number]  Room: [address] [place] [address]  Today's Date: 9/21/2024     Discipline: Occupational Therapy    Missed Visit Reason: Missed Visit Reason:  (Per RN, pt with low BP and currently receiving blood; she also needs a bolus after getting blood; OT will hold eval at this time and re-attempt when appropriate)    Missed Time: Attempt

## 2024-09-21 NOTE — SIGNIFICANT EVENT
09/21/24 0956   Onset Documentation   Rapid Response Initiated By Radar auto page   Location[place] [facility]  (LT 4085)   Pager Time 0955   Arrival Time 0956   Event End Time 1005   Level II Called No   Primary Reason for Call Radar auto page     Rapid Response Note    Radar auto-page received for a radar score of 8 with the following vital signs:  36.0, 104, 16, 85/49, 90%.  Vital signs were confirmed and reviewed with primary RN.  Hgb 6.8 and is currently receiving 1 unit of pRBCs to be followed by 1 liter NS bolus.  Patient is currently asymptomatic.  RN to contact Rapid Response with any future concerns or signs of clinical decompensation.

## 2024-09-21 NOTE — DOCUMENTATION CLARIFICATION NOTE
PATIENT:               TD ORTIZ  ACCT #:                  [id number]  MRN:                       [id number]  :                       1955  ADMIT DATE:       2024 5:09 AM  DISCH DATE:  RESPONDING PROVIDER #:        [id number]          PROVIDER RESPONSE TEXT:    Acute blood loss anemia    CDI QUERY TEXT:    Clarification    Instruction:    Based on your assessment of the patient and the clinical information, please provide the requested documentation by clicking on the appropriate radio button and enter any additional information if prompted.    Question: Is there a diagnosis indicative of the lab values and clinical indicators    When answering this query, please exercise your independent professional judgment. The fact that a question is being asked, does not imply that any particular answer is desired or expected.    The patient's clinical indicators include:  Clinical Information:  Patient is a 69 year old female who presented with ongoing low back pain, left foot drop and left lower extremity radiculopathy. On  patient was scheduled for a L1-pelvic fusion, L3/4, L4/5 PCO and L5/S1 TLIF.    Clinical Indicators:  On , Hgb 14.4 and Hct 43.5 (pre-operative)  On , Hgb   6.4 and Hct 19.4 (post-operative)    EBL in surgery 600cc    Post-operative Hemovac drain    Treatment:  Type and crossmatch for 2 units of PRBCs, orders to transfuse 2 units of PRBCs, orders for post-transfusion CBC, Albumin 5 percent IV, intake and output with monitoring of Hemovac, frequent assessment for any signs or symptoms of bleeding, frequent vital signs, safety precautions    Risk Factors:  Surgical spine surgery, Hemovac drain placement post-op, EBL 600cc  Options provided:  -- Acute blood loss anemia  -- Other - I will add my own diagnosis  -- Refer to Clinical Documentation Reviewer    Query created by: Aparna Hilliard on 2024 12:36 PM      Electronically signed by:  KIMMY CARVER MD 2024 5:24  PM

## 2024-09-21 NOTE — PROGRESS NOTES
Communication Note    Patient Name: Janelle Pitt  MRN: [id number]  Today's Date: 9/21/2024   Room: [address] [place] [address]    Discipline: Physical Therapy      Missed Visit: Yes  Missed Visit Reason:  (Pt with low hgb and low BP, receiving blood, will hold and reattempt as medically appropriate and schedule permits)      09/21/24 at 11:26 AM   Bela Fuentes, PT   Rehab Office: [phone]

## 2024-09-22 VITALS
HEIGHT: 61 IN | SYSTOLIC BLOOD PRESSURE: 120 MMHG | HEART RATE: 98 BPM | DIASTOLIC BLOOD PRESSURE: 82 MMHG | WEIGHT: 149 LBS | RESPIRATION RATE: 16 BRPM | BODY MASS INDEX: 28.13 KG/M2 | TEMPERATURE: 96.6 F | OXYGEN SATURATION: 92 %

## 2024-09-22 LAB
ANION GAP BLDA CALCULATED.4IONS-SCNC: 3 MMO/L (ref 10–25)
ANION GAP SERPL CALC-SCNC: 11 MMOL/L (ref 10–20)
BASE EXCESS BLDA CALC-SCNC: 2.9 MMOL/L (ref -2–3)
BLOOD EXPIRATION DATE: NORMAL
BLOOD EXPIRATION DATE: NORMAL
BODY TEMPERATURE: ABNORMAL
BUN SERPL-MCNC: 5 MG/DL (ref 6–23)
CA-I BLDA-SCNC: 1.23 MMOL/L (ref 1.1–1.33)
CALCIUM SERPL-MCNC: 8.2 MG/DL (ref 8.6–10.6)
CHLORIDE BLDA-SCNC: 107 MMOL/L (ref 98–107)
CHLORIDE SERPL-SCNC: 105 MMOL/L (ref 98–107)
CO2 SERPL-SCNC: 25 MMOL/L (ref 21–32)
CREAT SERPL-MCNC: 0.45 MG/DL (ref 0.5–1.05)
DISPENSE STATUS: NORMAL
DISPENSE STATUS: NORMAL
EGFRCR SERPLBLD CKD-EPI 2021: >90 ML/MIN/1.73M*2
ERYTHROCYTE [DISTWIDTH] IN BLOOD BY AUTOMATED COUNT: 18.4 % (ref 11.5–14.5)
GLUCOSE BLD MANUAL STRIP-MCNC: 114 MG/DL (ref 74–99)
GLUCOSE BLDA-MCNC: 115 MG/DL (ref 74–99)
GLUCOSE SERPL-MCNC: 90 MG/DL (ref 74–99)
HCO3 BLDA-SCNC: 27.9 MMOL/L (ref 22–26)
HCT VFR BLD AUTO: 30.9 % (ref 36–46)
HCT VFR BLD EST: 27 % (ref 36–46)
HGB BLD-MCNC: 9.8 G/DL (ref 12–16)
HGB BLDA-MCNC: 9.1 G/DL (ref 12–16)
LACTATE BLDA-SCNC: 0.9 MMOL/L (ref 0.4–2)
MAGNESIUM SERPL-MCNC: 1.87 MG/DL (ref 1.6–2.4)
MCH RBC QN AUTO: 30.7 PG (ref 26–34)
MCHC RBC AUTO-ENTMCNC: 31.7 G/DL (ref 32–36)
MCV RBC AUTO: 97 FL (ref 80–100)
NRBC BLD-RTO: 0 /100 WBCS (ref 0–0)
OXYHGB MFR BLDA: 95.8 % (ref 94–98)
PCO2 BLDA: 44 MM HG (ref 38–42)
PH BLDA: 7.41 PH (ref 7.38–7.42)
PLATELET # BLD AUTO: 141 X10*3/UL (ref 150–450)
PO2 BLDA: 80 MM HG (ref 85–95)
POTASSIUM BLDA-SCNC: 3.3 MMOL/L (ref 3.5–5.3)
POTASSIUM SERPL-SCNC: 3.8 MMOL/L (ref 3.5–5.3)
PRODUCT BLOOD TYPE: 5100
PRODUCT BLOOD TYPE: NORMAL
PRODUCT CODE: NORMAL
PRODUCT CODE: NORMAL
RBC # BLD AUTO: 3.19 X10*6/UL (ref 4–5.2)
SAO2 % BLDA: 99 % (ref 94–100)
SODIUM BLDA-SCNC: 135 MMOL/L (ref 136–145)
SODIUM SERPL-SCNC: 137 MMOL/L (ref 136–145)
UNIT ABO: NORMAL
UNIT ABO: NORMAL
UNIT NUMBER: NORMAL
UNIT NUMBER: NORMAL
UNIT RH: NORMAL
UNIT RH: NORMAL
UNIT VOLUME: 350
UNIT VOLUME: 350
WBC # BLD AUTO: 11.2 X10*3/UL (ref 4.4–11.3)
XM INTEP: NORMAL
XM INTEP: NORMAL

## 2024-09-22 PROCEDURE — 2500000004 HC RX 250 GENERAL PHARMACY W/ HCPCS (ALT 636 FOR OP/ED)

## 2024-09-22 PROCEDURE — 85027 COMPLETE CBC AUTOMATED: CPT | Performed by: NEUROLOGICAL SURGERY

## 2024-09-22 PROCEDURE — 97166 OT EVAL MOD COMPLEX 45 MIN: CPT | Mod: GO

## 2024-09-22 PROCEDURE — 2500000002 HC RX 250 W HCPCS SELF ADMINISTERED DRUGS (ALT 637 FOR MEDICARE OP, ALT 636 FOR OP/ED)

## 2024-09-22 PROCEDURE — 80048 BASIC METABOLIC PNL TOTAL CA: CPT | Performed by: NEUROLOGICAL SURGERY

## 2024-09-22 PROCEDURE — 97162 PT EVAL MOD COMPLEX 30 MIN: CPT | Mod: GP

## 2024-09-22 PROCEDURE — 1200000002 HC GENERAL ROOM WITH TELEMETRY DAILY

## 2024-09-22 PROCEDURE — 2500000001 HC RX 250 WO HCPCS SELF ADMINISTERED DRUGS (ALT 637 FOR MEDICARE OP): Performed by: NEUROLOGICAL SURGERY

## 2024-09-22 PROCEDURE — 82947 ASSAY GLUCOSE BLOOD QUANT: CPT

## 2024-09-22 PROCEDURE — 36415 COLL VENOUS BLD VENIPUNCTURE: CPT | Performed by: NEUROLOGICAL SURGERY

## 2024-09-22 PROCEDURE — 2500000004 HC RX 250 GENERAL PHARMACY W/ HCPCS (ALT 636 FOR OP/ED): Performed by: NEUROLOGICAL SURGERY

## 2024-09-22 RX ORDER — POTASSIUM CHLORIDE 14.9 MG/ML
20 INJECTION INTRAVENOUS
Status: COMPLETED | OUTPATIENT
Start: 2024-09-22 | End: 2024-09-22

## 2024-09-22 RX ORDER — POTASSIUM CHLORIDE 20 MEQ/1
20 TABLET, EXTENDED RELEASE ORAL ONCE
Status: COMPLETED | OUTPATIENT
Start: 2024-09-22 | End: 2024-09-22

## 2024-09-22 RX ADMIN — ACETAMINOPHEN 650 MG: 325 TABLET ORAL at 12:37

## 2024-09-22 RX ADMIN — ACETAMINOPHEN 650 MG: 325 TABLET ORAL at 03:29

## 2024-09-22 RX ADMIN — POTASSIUM CHLORIDE 20 MEQ: 1500 TABLET, EXTENDED RELEASE ORAL at 03:34

## 2024-09-22 RX ADMIN — ENOXAPARIN SODIUM 40 MG: 40 INJECTION SUBCUTANEOUS at 03:26

## 2024-09-22 RX ADMIN — POTASSIUM CHLORIDE 20 MEQ: 14.9 INJECTION, SOLUTION INTRAVENOUS at 08:39

## 2024-09-22 RX ADMIN — ACETAMINOPHEN 650 MG: 325 TABLET ORAL at 18:03

## 2024-09-22 RX ADMIN — POTASSIUM CHLORIDE 20 MEQ: 14.9 INJECTION, SOLUTION INTRAVENOUS at 06:01

## 2024-09-22 RX ADMIN — OXYCODONE HYDROCHLORIDE 5 MG: 5 TABLET ORAL at 03:30

## 2024-09-22 RX ADMIN — CYCLOBENZAPRINE 5 MG: 10 TABLET, FILM COATED ORAL at 08:53

## 2024-09-22 RX ADMIN — CYCLOBENZAPRINE 5 MG: 10 TABLET, FILM COATED ORAL at 15:15

## 2024-09-22 RX ADMIN — CYCLOBENZAPRINE 5 MG: 10 TABLET, FILM COATED ORAL at 22:40

## 2024-09-22 RX ADMIN — BISACODYL 10 MG: 5 TABLET, COATED ORAL at 03:29

## 2024-09-22 RX ADMIN — GABAPENTIN 600 MG: 300 CAPSULE ORAL at 22:40

## 2024-09-22 RX ADMIN — OXYCODONE HYDROCHLORIDE 10 MG: 10 TABLET ORAL at 22:39

## 2024-09-22 ASSESSMENT — PAIN SCALES - GENERAL
PAINLEVEL_OUTOF10: 6
PAINLEVEL_OUTOF10: 7
PAINLEVEL_OUTOF10: 8
PAINLEVEL_OUTOF10: 6
PAINLEVEL_OUTOF10: 2

## 2024-09-22 ASSESSMENT — COGNITIVE AND FUNCTIONAL STATUS - GENERAL
HELP NEEDED FOR BATHING: A LITTLE
CLIMB 3 TO 5 STEPS WITH RAILING: A LOT
TOILETING: A LITTLE
WALKING IN HOSPITAL ROOM: TOTAL
DAILY ACTIVITIY SCORE: 19
STANDING UP FROM CHAIR USING ARMS: A LITTLE
PERSONAL GROOMING: A LITTLE
HELP NEEDED FOR BATHING: A LOT
HELP NEEDED FOR BATHING: A LITTLE
MOBILITY SCORE: 19
CLIMB 3 TO 5 STEPS WITH RAILING: A LITTLE
STANDING UP FROM CHAIR USING ARMS: TOTAL
DRESSING REGULAR UPPER BODY CLOTHING: A LITTLE
DAILY ACTIVITIY SCORE: 19
MOVING TO AND FROM BED TO CHAIR: A LITTLE
MOBILITY SCORE: 18
MOVING FROM LYING ON BACK TO SITTING ON SIDE OF FLAT BED WITH BEDRAILS: A LOT
DAILY ACTIVITIY SCORE: 16
TURNING FROM BACK TO SIDE WHILE IN FLAT BAD: A LOT
DRESSING REGULAR UPPER BODY CLOTHING: A LITTLE
STANDING UP FROM CHAIR USING ARMS: A LITTLE
CLIMB 3 TO 5 STEPS WITH RAILING: TOTAL
MOVING TO AND FROM BED TO CHAIR: TOTAL
TOILETING: A LITTLE
DRESSING REGULAR LOWER BODY CLOTHING: A LITTLE
TURNING FROM BACK TO SIDE WHILE IN FLAT BAD: A LITTLE
TOILETING: A LOT
WALKING IN HOSPITAL ROOM: A LITTLE
TURNING FROM BACK TO SIDE WHILE IN FLAT BAD: A LITTLE
DRESSING REGULAR UPPER BODY CLOTHING: A LITTLE
PERSONAL GROOMING: A LITTLE
DRESSING REGULAR LOWER BODY CLOTHING: A LOT
MOVING TO AND FROM BED TO CHAIR: A LITTLE
WALKING IN HOSPITAL ROOM: A LITTLE
MOBILITY SCORE: 8
PERSONAL GROOMING: A LITTLE
DRESSING REGULAR LOWER BODY CLOTHING: A LITTLE

## 2024-09-22 ASSESSMENT — PAIN DESCRIPTION - LOCATION
LOCATION: BACK

## 2024-09-22 ASSESSMENT — ACTIVITIES OF DAILY LIVING (ADL)
BATHING_ASSISTANCE: MODERATE
ADL_ASSISTANCE: INDEPENDENT
ADL_ASSISTANCE: INDEPENDENT

## 2024-09-22 ASSESSMENT — PAIN SCALES - PAIN ASSESSMENT IN ADVANCED DEMENTIA (PAINAD): TOTALSCORE: MEDICATION (SEE MAR);REPOSITIONED

## 2024-09-22 ASSESSMENT — PAIN - FUNCTIONAL ASSESSMENT
PAIN_FUNCTIONAL_ASSESSMENT: 0-10

## 2024-09-22 NOTE — PROGRESS NOTES
Occupational Therapy    Evaluation    Patient Name: Janelle Pitt  MRN: [id number]  Department: [place] [address]  Room: [address] [place] [address]  Today's Date: 9/22/2024  Time Calculation  Start Time: 0932  Stop Time: 0951  Time Calculation (min): 19 min    Assessment  IP OT Assessment  OT Assessment: Patient will benefit from OT services to address deficits in ADLs/IADLs, strength, functional mobility.  Prognosis: Good  Barriers to Discharge: Inaccessible home environment  End of Session Communication: Bedside nurse  End of Session Patient Position: Bed, 3 rail up, Alarm off, not on at start of session  Plan:  Treatment Interventions: ADL retraining, Functional transfer training, UE strengthening/ROM, Endurance training, Patient/family training, Equipment evaluation/education, Neuromuscular reeducation, Compensatory technique education  OT Frequency: 4 times per week  OT Discharge Recommendations: High intensity level of continued care  OT Recommended Transfer Status: Minimal assist, Assist of 2  OT - OK to Discharge: Yes    Subjective   General:  General  Reason for Referral: s/p L1-pelvic fusion, L3/4, L4/5 PCO, L5/S1 TLIF  Past Medical History Relevant to Rehab: diverticulosis, HLD, osteoporosis, PAD  Family/Caregiver Present: No  Co-Treatment: PT  Co-Treatment Reason: to maximize patient safety and therapeutic gains  Prior to Session Communication: Bedside nurse  Patient Position Received: Bed, 3 rail up, Alarm off, not on at start of session  General Comment: patient pleasant and agreeable to OT  Precautions:  Hearing/Visual Limitations: hearing is WFL  Medical Precautions: Fall precautions, Spinal precautions  Post-Surgical Precautions: Spinal precautions    Vital Signs:  Pre: /68 (80), HR 98, SpO2 95%  EOB: BP 96/64 (78)  Post: /64 (76), HR 90    Pain:  Pain Assessment  Pain Assessment: 0-10  0-10 (Numeric) Pain Score:  (patient reports 7/10 surgical back pain)    Objective   Cognition:  Overall Cognitive Status: Within  Functional Limits  Arousal/Alertness: Appropriate responses to stimuli  Orientation Level: Oriented X4  Following Commands: Follows one step commands consistently     Home Living:  Type of Home: House  Lives With: Spouse  Home Adaptive Equipment: Walker rolling or standard, Cane  Home Layout: Two level, Stairs to alternate level with rails  Home Access: Stairs to enter with rails  Entrance Stairs-Number of Steps: 3  Bathroom Shower/Tub: Walk-in shower  Bathroom Equipment: Grab bars in shower, Bedside commode, Shower chair with back   Prior Function:  Level of [place]: Independent with ADLs and functional transfers, Independent with homemaking with ambulation  ADL Assistance: Independent  Homemaking Assistance: Independent  Ambulatory Assistance: Independent (uses cane in community)  Leisure: enjoys gardening  Hand Dominance: Right  Prior Function Comments: drives     ADL:  Eating Assistance: Independent  Eating Deficit: Setup  Grooming Assistance: Stand by  Bathing Assistance: Moderate  Bathing Deficit:  (anticipated)  UE Dressing Assistance: Stand by  UE Dressing Deficit:  (anticipated)  LE Dressing Assistance: Maximal  LE Dressing Deficit: Don/doff R sock, Don/doff L sock  Toileting Assistance with Device: Moderate  Toileting Deficit:  (anticipated)  Activity Tolerance:  Endurance: Tolerates 10 - 20 min exercise with multiple rests  Bed Mobility/Transfers: Bed Mobility  Bed Mobility: Yes  Bed Mobility 1  Bed Mobility 1: Rolling right  Level of Assistance 1: Moderate assistance, Moderate verbal cues  Bed Mobility 2  Bed Mobility  2: Side lying right to sit  Level of Assistance 2: Moderate assistance, Moderate verbal cues  Bed Mobility 3  Bed Mobility 3: Sitting to supine  Level of Assistance 3: Moderate assistance, +2, Moderate verbal cues  Bed Mobility Comments 3: cues for log-roll    Transfers  Transfer: Yes  Transfer 1  Transfer From 1: Bed to, Stand to  Transfer to 1: Stand, Bed  Technique 1: Sit to stand,  Stand to sit  Transfer Level of Assistance 1: Minimum assistance, +2, Minimal verbal cues, Arm in arm assistance  Trials/Comments 1: min A x2 for taking side steps towards HOB with (B) arm in arm assist    Sitting Balance:  Static Sitting Balance  Static Sitting-Level of Assistance: Contact guard, Minimum assistance  Static Sitting-Comment/Number of Minutes: (L) lateral lean  Dynamic Sitting Balance  Dynamic Sitting-Level of Assistance: Minimum assistance, Moderate assistance  Standing Balance:  Static Standing Balance  Static Standing-Level of Assistance: Minimum assistance  Dynamic Standing Balance  Dynamic Standing-Comments: min A x2     Vision: Vision - Basic Assessment  Current Vision: Wears glasses only for reading  Sensation:  Light Touch: No apparent deficits  Strength:  Strength Comments: (B)  4-/5, (B) shoulders/elbows >/= 3/5     Extremities: RUE   RUE :  (AROM WFL) and LUE   LUE:  (AROM WFL)    Outcome Measures: [facility] Daily Activity  Putting on and taking off regular lower body clothing: A lot  Bathing (including washing, rinsing, drying): A lot  Putting on and taking off regular upper body clothing: A little  Toileting, which includes using toilet, bedpan or urinal: A lot  Taking care of personal grooming such as brushing teeth: A little  Eating Meals: None  Daily Activity - Total Score: 16     and OT Adult Other Outcome Measures  4AT: negative  Education Documentation  Body Mechanics, taught by Jennifer Calixto OT at 9/22/2024  2:02 PM.  Learner: Patient  Readiness: Acceptance  Method: Explanation, Demonstration  Response: Needs Reinforcement, Verbalizes Understanding    Precautions, taught by Jennifer Calixto OT at 9/22/2024  2:02 PM.  Learner: Patient  Readiness: Acceptance  Method: Explanation, Demonstration  Response: Needs Reinforcement, Verbalizes Understanding    ADL Training, taught by Jennifer Calixto OT at 9/22/2024  2:02 PM.  Learner: Patient  Readiness: Acceptance  Method:  Explanation, Demonstration  Response: Needs Reinforcement, Verbalizes Understanding    Education Comments  No comments found.    Goals:   Encounter Problems       Encounter Problems (Active)       ADLs       Patient with complete upper body dressing with independent level of assistance donning and doffing all UE clothes with no adaptive equipment. (Progressing)       Start:  09/22/24    Expected End:  10/06/24            Patient with complete lower body dressing with contact guard assist level of assistance donning and doffing all LE clothes  with PRN adaptive equipment. (Progressing)       Start:  09/22/24    Expected End:  10/06/24            Patient will complete daily grooming tasks with stand by assist level of assistance and PRN adaptive equipment while standing. (Progressing)       Start:  09/22/24    Expected End:  10/06/24            Patient will complete toileting including hygiene clothing management/hygiene with stand by assist level of assistance. (Progressing)       Start:  09/22/24    Expected End:  10/06/24               BALANCE       Pt will maintain dynamic sitting balance during ADL task with stand by assist level of assistance in order to demonstrate decreased risk of falling and improved postural control. (Progressing)       Start:  09/22/24    Expected End:  10/06/24               COGNITION/SAFETY       Patient will recall and adhere to spine precautions during all functional mobility/ADL tasks in order to demonstrate improved understanding and promote healing post op (Progressing)       Start:  09/22/24    Expected End:  10/06/24               EXERCISE/STRENGTHENING       Patient will be educated on BUE HEP, within spine precautions, for increased ADL performance. (Progressing)       Start:  09/22/24    Expected End:  10/06/24               MOBILITY       Patient will perform Functional mobility Household distances/Community Distances with contact guard assist level of assistance and least  restrictive device in order to improve safety and functional mobility. (Progressing)       Start:  09/22/24    Expected End:  10/06/24               TRANSFERS       Patient will perform bed mobility contact guard assist level of assistance in order to improve safety and independence with mobility (Progressing)       Start:  09/22/24    Expected End:  10/06/24            Patient will complete functional transfers with least restrictive device with contact guard assist level of assistance. (Progressing)       Start:  09/22/24    Expected End:  10/06/24               Jennifer aClixto OTR/L  Inpatient Occupational Therapist   Rehab Office: [phone]

## 2024-09-22 NOTE — SIGNIFICANT EVENT
09/22/24 0044   Onset Documentation   Rapid Response Initiated By Radar auto page   Location/Room [facility]  ([id number])   Pager Time 2139   Arrival Time 2139   Event End Time 2250   Level II Called No   Primary Reason for Call   (RADAR auto page per the following VS entered within EMR at 2139: T 37.6 C; ; RR16 ; BP 83/50 ; SpO2 84% RA)     Rapid RN to bedside. Primary team neurosurgery resident Dr. Miguel notified and to bedside also. Patient revitalized: T 37.7 C; ; RR 16; BP 94/61; SpO2 94% (placed on 2L via NC for sat of 88% by division RN prior to arrival). Patient awake, alert and oriented to self, date/day, location and situation. She was pleasant, compliant and without signs or symptoms of distress. Denies the following PMH of home O2, CPAP at night or COPD/pulm fiborsis history. Denied SOB, cough or chest pain. EKG obtained--sinus tach with occasional PVCs (placed in chart). Patient received 2 units of PRBC and IV fluid bolus on 9/21. No obvious signs of bleeding. Surgical wound vac with minimal blood output tonight per RN. Orders for blood work placed and obtained. ABG ordered and RT to bedside to obtain. CXR ordered. ABG drawn while patient on 2L via NC (ABG full panel results were processed by RT however the values did not populate into the EMR for unknown reasons):    pH--7.41;  pCO2--44;  pO2--80;  SaO2--98.7%;  HCO3--27.9;  lactate level 0.9    One time 500cc LR bolus ordered @ 250cc/hr. Continuous SpO2 orders placed. Patient remains on continuous telemetry. Plan is for patient to remain on current division at this time. RN asked to obtain new set of vitals following IV fluid bolus and was encouraged to page Rapid Response Team if patient deteriorates.    Vitals at end of event: ; RR 17; BP 97/65; 95% 2L via NC

## 2024-09-22 NOTE — PROGRESS NOTES
Physical Therapy    Physical Therapy Evaluation    Patient Name: Janelle Pitt  MRN: [id number]  Department: [place] [id number]  Room: [address] [place] [address]  Today's Date: 9/22/2024   Time Calculation  Start Time: 0932  Stop Time: 0951  Time Calculation (min): 19 min    Assessment/Plan   PT Assessment  PT Assessment Results: Decreased strength, Decreased endurance, Impaired balance, Decreased mobility, Pain  Rehab Prognosis: Good  Strengths: Premorbid level of function  Barriers to Participation: Comorbidities  End of Session Communication: Bedside nurse  Assessment Comment: Pt tolerated PT eval fair, inc time and cues provided for all activity. Overall mobility limited by dec strength, impaired balance, postural control and activity toleance. Would benefit from high intensity therapy upon d/c. Will continue to follow  End of Session Patient Position: Bed, 3 rail up, Alarm off, not on at start of session  IP OR SWING BED PT PLAN  Inpatient or Swing Bed: Inpatient  PT Plan  Treatment/Interventions: Bed mobility, Transfer training, Gait training, Stair training, Balance training, Strengthening, Therapeutic exercise, Therapeutic activity, Home exercise program  PT Plan: Ongoing PT  PT Frequency: Daily  PT Discharge Recommendations: High intensity level of continued care  PT Recommended Transfer Status: Assist x2  PT - OK to Discharge: Yes (eval complete, d/c recommendation made)      Subjective   General Visit Information:  General  Reason for Referral: s/p L1-pelvic fusion, L3/4, L4/5 PCO, L5/S1 TLIF  Past Medical History Relevant to Rehab: diverticulosis, HLD, osteoporosis, PAD  Missed Visit: Yes  Missed Visit Reason:  (Pt with low hgb and low BP, receiving blood, will hold and reattempt as medically appropriate and schedule permits)  Family/Caregiver Present: No  Prior to Session Communication: Bedside nurse  Patient Position Received: Bed, 3 rail up, Alarm off, not on at start of session  General Comment: supine in bed, pleasant and  cooperative  Home Living:  Home Living  Type of Home: House  Lives With: Spouse  Home Adaptive Equipment: Walker rolling or standard, Cane  Home Layout: Two level, Stairs to alternate level with rails  Alternate Level Stairs-Rails: Both  Alternate Level Stairs-Number of Steps: 12  Home Access: Stairs to enter with rails  Entrance Stairs-Number of Steps: 3  Bathroom Shower/Tub: Walk-in shower  Bathroom Equipment: Grab bars in shower, Bedside commode, Shower chair with back  Prior Level of Function:  Prior Function Per Pt/Caregiver Report  Level of [place]: Independent with ADLs and functional transfers, Independent with homemaking with ambulation  ADL Assistance: Independent  Homemaking Assistance: Independent  Ambulatory Assistance: Independent (without device household distances, [place] in the community)  Leisure: enjoys gardening  Hand Dominance: Right  Prior Function Comments: drives  Precautions:  Precautions  Medical Precautions: Fall precautions, Spinal precautions            Objective   Pain:  Pain Assessment  0-10 (Numeric) Pain Score: 7  Pain Type: Surgical pain  Pain Location: Back  Cognition:  Cognition  Overall Cognitive Status: Within Functional Limits  Orientation Level: Oriented X4    General Assessments:                            Static Sitting Balance  Static Sitting-Balance Support: Feet supported, Bilateral upper extremity supported  Static Sitting-Level of Assistance: Minimum assistance  Static Sitting-Comment/Number of Minutes: varying levels of assist min-CGA with L lateral lean    Static Standing Balance  Static Standing-Balance Support: Bilateral upper extremity supported  Static Standing-Level of Assistance: Minimum assistance (x2)  Functional Assessments:  Bed Mobility  Bed Mobility: Yes  Bed Mobility 1  Bed Mobility 1: Rolling right  Level of Assistance 1: Moderate assistance, Moderate verbal cues  Bed Mobility 2  Bed Mobility  2: Side lying right to sit  Level of Assistance 2: Moderate  assistance, Moderate verbal cues  Bed Mobility 3  Bed Mobility 3: Sitting to supine  Level of Assistance 3: Moderate assistance, +2, Moderate verbal cues    Transfers  Transfer: Yes  Transfer 1  Transfer From 1: Bed to, Stand to  Transfer to 1: Stand, Bed  Technique 1: Sit to stand, Stand to sit  Transfer Device 1:  (steve HHA)  Transfer Level of Assistance 1: Arm in arm assistance, +2, Minimal verbal cues    Ambulation/Gait Training  Ambulation/Gait Training Performed: Yes  Ambulation/Gait Training 1  Surface 1: Level tile  Device 1:  (steve HHA)  Assistance 1: Minimum assistance, Minimal verbal cues (x2)  Quality of Gait 1: Wide base of support (dec step length and foot clearance)  Comments/Distance (ft) 1: 2 lateral side steps  Extremity/Trunk Assessments:  RLE   RLE : Within Functional Limits  LLE   LLE :  (hip and knee WFL, ankle DF 2+/5)  Outcome Measures:  [facility] Basic Mobility  Turning from your back to your side while in a flat bed without using bedrails: A lot  Moving from lying on your back to sitting on the side of a flat bed without using bedrails: A lot  Moving to and from bed to chair (including a wheelchair): Total  Standing up from a chair using your arms (e.g. wheelchair or bedside chair): Total  To walk in hospital room: Total  Climbing 3-5 steps with railing: Total  Basic Mobility - Total Score: 8    Encounter Problems       Encounter Problems (Active)       Mobility       Pt will ascend/descend single flight of stairs with steve railing mod I (Progressing)       Start:  09/22/24    Expected End:  10/06/24            Pt will amb 150ft with LRAD mod I (Progressing)       Start:  09/22/24    Expected End:  10/06/24               PT Transfers       Pt will perform supine<>sit mod I (Progressing)       Start:  09/22/24    Expected End:  10/06/24            Pt will perform sit<>stand with LRAD mod I (Progressing)       Start:  09/22/24    Expected End:  10/06/24                   Education  Documentation  Precautions, taught by Bela Fuentes PT at 9/22/2024 11:59 AM.  Learner: Patient  Readiness: Acceptance  Method: Explanation  Response: Verbalizes Understanding  Comment: progression of mobility    Body Mechanics, taught by Bela Fuentes PT at 9/22/2024 11:59 AM.  Learner: Patient  Readiness: Acceptance  Method: Explanation  Response: Verbalizes Understanding  Comment: progression of mobility    Mobility Training, taught by Bela Fuentes PT at 9/22/2024 11:59 AM.  Learner: Patient  Readiness: Acceptance  Method: Explanation  Response: Verbalizes Understanding  Comment: progression of mobility    Education Comments  No comments found.

## 2024-09-22 NOTE — PROGRESS NOTES
"Janelle Pitt is a 69 y.o. female on day 2 of admission presenting with Left foot drop.    Subjective   Episode of hypotension improved with fluids    Objective     Physical Exam  LLE DF3 o/w 5/5  Incision c/d/I  Drain in place    Last Recorded Vitals  Blood pressure 97/65, pulse 80, temperature 37.7 °C (99.9 °F), temperature source Axillary, resp. rate 17, height 1.549 m (5' 1\"), weight 67.6 kg (149 lb), SpO2 95%.  Intake/Output last 3 Shifts:  I/O last 3 completed shifts:  In: 4413.8 (65.3 mL/kg) [P.O.:780; I.V.:3583.8 (53 mL/kg); IV Piggyback:50]  Out: 3320 (49.1 mL/kg) [Urine:2045 (0.8 mL/kg/hr); Blood:600]  Weight: 67.6 kg     Relevant Results    Assessment/Plan   Principal Problem:    Left foot drop  Active Problems:    Hyperlipidemia    Chronic low back pain    Anxiety    Depression    Chronic pain syndrome    Lumbar disc disease    HTN (hypertension)    Type 2 diabetes mellitus, without long-term current use of insulin (Multi)    Primary osteoarthritis of left hip    Lumbar foraminal stenosis    Chronic lumbar radiculopathy    Scoliosis of thoracolumbar region due to degenerative disease of spine in adult    69yF h/o diverticulosis, HLD, osteoporosis, PAD, p/w back pain and LLE radiculopathy with foot drop, 9/20 s/p L1-pelvic fusion, L3/4, L4/5 PCO, L5/S1 TLIF, 9/21 hgb 6.4 s/p 2u pRBC, 9/22 s/p hypotensive, desat at 90%, CXR RLL opacification, ekg normal sinus, occasional PVCs      PLAN  Floor  Continue drain  AFO  Wean O2  PTOT  Scds, LVX      Nadine Angel MD      "

## 2024-09-22 NOTE — CARE PLAN
The patient's goals for the shift include  free from discomforts    The clinical goals for the shift include Patient will be free from injury

## 2024-09-23 ENCOUNTER — APPOINTMENT (OUTPATIENT)
Dept: RADIOLOGY | Facility: HOSPITAL | Age: 69
DRG: 454 | End: 2024-09-23
Payer: MEDICARE

## 2024-09-23 LAB
ANION GAP SERPL CALC-SCNC: 13 MMOL/L (ref 10–20)
BUN SERPL-MCNC: 4 MG/DL (ref 6–23)
CALCIUM SERPL-MCNC: 8.3 MG/DL (ref 8.6–10.6)
CHLORIDE SERPL-SCNC: 102 MMOL/L (ref 98–107)
CO2 SERPL-SCNC: 25 MMOL/L (ref 21–32)
CREAT SERPL-MCNC: 0.48 MG/DL (ref 0.5–1.05)
EGFRCR SERPLBLD CKD-EPI 2021: >90 ML/MIN/1.73M*2
ERYTHROCYTE [DISTWIDTH] IN BLOOD BY AUTOMATED COUNT: 17 % (ref 11.5–14.5)
GLUCOSE SERPL-MCNC: 93 MG/DL (ref 74–99)
HCT VFR BLD AUTO: 29.4 % (ref 36–46)
HGB BLD-MCNC: 9.6 G/DL (ref 12–16)
MCH RBC QN AUTO: 30.1 PG (ref 26–34)
MCHC RBC AUTO-ENTMCNC: 32.7 G/DL (ref 32–36)
MCV RBC AUTO: 92 FL (ref 80–100)
NRBC BLD-RTO: 0 /100 WBCS (ref 0–0)
PLATELET # BLD AUTO: 185 X10*3/UL (ref 150–450)
POTASSIUM SERPL-SCNC: 3.6 MMOL/L (ref 3.5–5.3)
RBC # BLD AUTO: 3.19 X10*6/UL (ref 4–5.2)
SODIUM SERPL-SCNC: 136 MMOL/L (ref 136–145)
WBC # BLD AUTO: 11.3 X10*3/UL (ref 4.4–11.3)

## 2024-09-23 PROCEDURE — 2500000001 HC RX 250 WO HCPCS SELF ADMINISTERED DRUGS (ALT 637 FOR MEDICARE OP): Performed by: PHYSICIAN ASSISTANT

## 2024-09-23 PROCEDURE — 71045 X-RAY EXAM CHEST 1 VIEW: CPT | Performed by: RADIOLOGY

## 2024-09-23 PROCEDURE — 1200000002 HC GENERAL ROOM WITH TELEMETRY DAILY

## 2024-09-23 PROCEDURE — 2500000004 HC RX 250 GENERAL PHARMACY W/ HCPCS (ALT 636 FOR OP/ED): Performed by: NEUROLOGICAL SURGERY

## 2024-09-23 PROCEDURE — 82374 ASSAY BLOOD CARBON DIOXIDE: CPT | Performed by: NEUROLOGICAL SURGERY

## 2024-09-23 PROCEDURE — 2500000001 HC RX 250 WO HCPCS SELF ADMINISTERED DRUGS (ALT 637 FOR MEDICARE OP): Performed by: NEUROLOGICAL SURGERY

## 2024-09-23 PROCEDURE — 36415 COLL VENOUS BLD VENIPUNCTURE: CPT | Performed by: NEUROLOGICAL SURGERY

## 2024-09-23 PROCEDURE — 71045 X-RAY EXAM CHEST 1 VIEW: CPT

## 2024-09-23 PROCEDURE — 2500000001 HC RX 250 WO HCPCS SELF ADMINISTERED DRUGS (ALT 637 FOR MEDICARE OP)

## 2024-09-23 PROCEDURE — 85027 COMPLETE CBC AUTOMATED: CPT | Performed by: NEUROLOGICAL SURGERY

## 2024-09-23 PROCEDURE — 97116 GAIT TRAINING THERAPY: CPT | Mod: GP | Performed by: PHYSICAL THERAPIST

## 2024-09-23 RX ORDER — AMOXICILLIN 250 MG
1 CAPSULE ORAL NIGHTLY
Status: DISCONTINUED | OUTPATIENT
Start: 2024-09-23 | End: 2024-09-25 | Stop reason: HOSPADM

## 2024-09-23 RX ORDER — POLYETHYLENE GLYCOL 3350 17 G/17G
17 POWDER, FOR SOLUTION ORAL 2 TIMES DAILY
Status: CANCELLED
Start: 2024-09-23

## 2024-09-23 RX ORDER — BISACODYL 5 MG
10 TABLET, DELAYED RELEASE (ENTERIC COATED) ORAL DAILY PRN
Status: CANCELLED
Start: 2024-09-23

## 2024-09-23 RX ORDER — AMOXICILLIN 250 MG
1 CAPSULE ORAL NIGHTLY
Status: CANCELLED
Start: 2024-09-23

## 2024-09-23 RX ADMIN — ACETAMINOPHEN 650 MG: 325 TABLET ORAL at 12:10

## 2024-09-23 RX ADMIN — MELATONIN TAB 5 MG 5 MG: 5 TAB at 21:18

## 2024-09-23 RX ADMIN — POLYETHYLENE GLYCOL 3350 17 G: 17 POWDER, FOR SOLUTION ORAL at 21:20

## 2024-09-23 RX ADMIN — CYCLOBENZAPRINE 5 MG: 10 TABLET, FILM COATED ORAL at 21:18

## 2024-09-23 RX ADMIN — CYCLOBENZAPRINE 5 MG: 10 TABLET, FILM COATED ORAL at 09:41

## 2024-09-23 RX ADMIN — OXYCODONE HYDROCHLORIDE 5 MG: 5 TABLET ORAL at 21:23

## 2024-09-23 RX ADMIN — ENOXAPARIN SODIUM 40 MG: 40 INJECTION SUBCUTANEOUS at 01:40

## 2024-09-23 RX ADMIN — CYCLOBENZAPRINE 5 MG: 10 TABLET, FILM COATED ORAL at 15:34

## 2024-09-23 RX ADMIN — SENNOSIDES AND DOCUSATE SODIUM 1 TABLET: 50; 8.6 TABLET ORAL at 21:20

## 2024-09-23 RX ADMIN — GABAPENTIN 600 MG: 300 CAPSULE ORAL at 21:18

## 2024-09-23 RX ADMIN — ACETAMINOPHEN 650 MG: 325 TABLET ORAL at 06:56

## 2024-09-23 RX ADMIN — ACETAMINOPHEN 650 MG: 325 TABLET ORAL at 23:50

## 2024-09-23 RX ADMIN — ACETAMINOPHEN 650 MG: 325 TABLET ORAL at 17:54

## 2024-09-23 RX ADMIN — ENOXAPARIN SODIUM 40 MG: 40 INJECTION SUBCUTANEOUS at 23:50

## 2024-09-23 ASSESSMENT — COGNITIVE AND FUNCTIONAL STATUS - GENERAL
MOVING TO AND FROM BED TO CHAIR: A LITTLE
MOVING TO AND FROM BED TO CHAIR: A LITTLE
MOBILITY SCORE: 15
WALKING IN HOSPITAL ROOM: A LITTLE
HELP NEEDED FOR BATHING: A LITTLE
STANDING UP FROM CHAIR USING ARMS: A LOT
CLIMB 3 TO 5 STEPS WITH RAILING: A LOT
MOVING FROM LYING ON BACK TO SITTING ON SIDE OF FLAT BED WITH BEDRAILS: A LITTLE
STANDING UP FROM CHAIR USING ARMS: A LITTLE
MOBILITY SCORE: 18
TOILETING: A LITTLE
CLIMB 3 TO 5 STEPS WITH RAILING: A LOT
DRESSING REGULAR UPPER BODY CLOTHING: A LITTLE
TURNING FROM BACK TO SIDE WHILE IN FLAT BAD: A LOT
TURNING FROM BACK TO SIDE WHILE IN FLAT BAD: A LITTLE
PERSONAL GROOMING: A LITTLE
WALKING IN HOSPITAL ROOM: A LITTLE
DRESSING REGULAR LOWER BODY CLOTHING: A LITTLE
DAILY ACTIVITIY SCORE: 19

## 2024-09-23 ASSESSMENT — PAIN - FUNCTIONAL ASSESSMENT
PAIN_FUNCTIONAL_ASSESSMENT: 0-10

## 2024-09-23 ASSESSMENT — PAIN SCALES - GENERAL
PAINLEVEL_OUTOF10: 3
PAINLEVEL_OUTOF10: 6
PAINLEVEL_OUTOF10: 0 - NO PAIN
PAINLEVEL_OUTOF10: 3
PAINLEVEL_OUTOF10: 5 - MODERATE PAIN
PAINLEVEL_OUTOF10: 5 - MODERATE PAIN

## 2024-09-23 ASSESSMENT — PAIN DESCRIPTION - LOCATION: LOCATION: BACK

## 2024-09-23 NOTE — PROGRESS NOTES
Physical Therapy    Physical Therapy Treatment    Patient Name: Janelle Pitt  MRN: [id number]  Department: [place] [id number]  Room: [address] [place] [address]  Today's Date: 9/23/2024  Time Calculation  Start Time: 1646  Stop Time: 1720  Time Calculation (min): 34 min         Assessment/Plan   PT Assessment  PT Assessment Results: Decreased strength, Decreased endurance, Impaired balance, Decreased mobility, Pain, Decreased skin integrity, Orthopedic restrictions  Rehab Prognosis: Excellent  Barriers to Discharge: none noted; needs rehab as noted  Evaluation/Treatment Tolerance: Patient tolerated treatment well  Medical Staff Made Aware: Yes  Strengths: Ability to acquire knowledge, Coping skills, Support of Caregivers  Barriers to Participation:  (none)  End of Session Communication: Bedside nurse  Assessment Comment: 68 yo female present s/p L1-pelvic fusion, L3-5 PCO, L5/S1 TLIF with Left foot drop, dyspnea on exertion, weakness and balance deficits. Recommend high intensity therapy as pt has many stairs, needs a custom Left AFO from rehab, is not at baseline, is making good gains and appears motivated.  End of Session Patient Position: Bed, 3 rail up, Alarm on  PT Plan  Inpatient/Swing Bed or Outpatient: Inpatient  PT Plan  Treatment/Interventions: Bed mobility, Transfer training, Gait training, Stair training, Balance training, Neuromuscular re-education, Endurance training, Therapeutic exercise, Therapeutic activity, Home exercise program, Postural re-education  PT Plan: Ongoing PT  PT Frequency: Daily  PT Discharge Recommendations: High intensity level of continued care  Equipment Recommended upon Discharge: Other (comment) (TBD at rehab; likely new custom left AFO)  PT Recommended Transfer Status: Assist x1, Assistive device (WW, Left AFO, min/mod A)  PT - OK to Discharge: Yes (PT eval completed & DC recs made)      General Visit Information:   PT  Visit  PT Received On: 09/23/24  Response to Previous Treatment: Patient with no  complaints from previous session.  General  Reason for Referral: s/p L1-pelvic fusion, L3/4, L4/5 PCO, L5/S1 TLIF  Past Medical History Relevant to Rehab: diverticulosis, HLD, osteoporosis, PAD, Left foot drop, DM  Missed Visit: No  Missed Visit Reason:  (.)  Family/Caregiver Present: Yes  Caregiver Feedback: Dtr and son present and engaged.  Co-Treatment:  (N/A)  Co-Treatment Reason:  (.)  Prior to Session Communication: Bedside nurse  Patient Position Received: Bed, 3 rail up, Alarm on  Preferred Learning Style: verbal  General Comment: Pt supine alert, eating but per dtr not eating much; pt willing to work with PT. Pt able to ambulate and do a few stairs with assist as noted. Pt unable to tolerate off shelf Left AFO with sock/tape (dtr/son going to bring a shoe/sock tomorrow) so walked most of time without it.Pt wants to go home but PT explained why rehab recommended (pt has a flight of stairs).    Subjective   Precautions:  Precautions  Medical Precautions: Fall precautions (Left foot drop (has new off shelf AFO, family bringing a sock and shoe tomorrow), anemia, COLEY, osteoporosis)  Post-Surgical Precautions: Spinal precautions    Vital Signs (Past 2hrs)        Date/Time Vitals Session Patient Position Pulse Resp SpO2 BP MAP (mmHg)    09/23/24 1554 --  --  93  18  94 %  108/63  78     09/23/24 1646 During PT  Sitting  83  --  94 %  --  --                   Vital Signs Comment: post stairs     Objective   Pain:  Pain Assessment  Pain Assessment: 0-10  0-10 (Numeric) Pain Score:  (back pain 8/10 with gait, 6/10 supine at rest post; reported discomfort Left foot with AFO on briefly as noted)  Pain Interventions: Repositioned, Ambulation/increased activity, Therapeutic touch, Therapeutic presence, Rest (removal of AFO)  Response to Interventions: less pain supine end of session  Cognition:  Cognition  Overall Cognitive Status: Within Functional Limits  Arousal/Alertness: Appropriate responses to stimuli  Orientation  Level: Oriented X4  Following Commands: Follows all commands and directions without difficulty  Coordination:     Postural Control:  Postural Control  Postural Control: Impaired  Posture Comment: standing with WW: flexed upper trunk, mild rounded shoulders, slightly wider YANG  Static Sitting Balance  Static Sitting-Balance Support: Feet supported, Bilateral upper extremity supported  Static Sitting-Level of Assistance: Contact guard  Dynamic Sitting Balance  Dynamic Sitting-Balance Support: Feet supported, Bilateral upper extremity supported  Dynamic Sitting-Level of Assistance: Contact guard  Dynamic Sitting-Balance:  (scooting to EOB)  Static Standing Balance  Static Standing-Balance Support: Bilateral upper extremity supported (on WW)  Static Standing-Level of Assistance: Contact guard  Static Standing-Comment/Number of Minutes: with or without Left AFO on  Dynamic Standing Balance  Dynamic Standing-Balance Support: Bilateral upper extremity supported (on WW)  Dynamic Standing-Level of Assistance: Contact guard (CG/min A with or without Left AFO)  Extremity/Trunk Assessments:  LLE   LLE : Exceptions to WFL  AROM LLE (degrees)  LLE AROM Comment: AAROM ankle grossly WFL  Strength LLE  LLE Overall Strength:  (ankle DF 2, inversion 2+, eversion 2-, PF >3-)  Activity Tolerance:  Activity Tolerance  Endurance: Tolerates 10 - 20 min exercise with multiple rests (COLEY)  Treatments:  Therapeutic Exercise  Therapeutic Exercise Performed: Yes  Therapeutic Exercise Activity 1: assessment of/ankle DF, PF, inv/eversion Left side prior to donning AFO that she had been issued    Therapeutic Activity  Therapeutic Activity Performed: Yes  Therapeutic Activity 1: transfer training (on/off bed as noted, on/off toilet as noted)  Therapeutic Activity 2: bed mobility training with focus on logrolling and coming to/from sit, hand placement    Bed Mobility  Bed Mobility: Yes  Bed Mobility 1  Bed Mobility 1: Rolling right, Rolling  left  Level of Assistance 1: Minimum assistance, Moderate verbal cues, Minimal tactile cues  Bed Mobility Comments 1: use of rail  Bed Mobility 2  Bed Mobility  2: Side lying right to sit (sit to Right sidelying)  Level of Assistance 2: Moderate assistance, Moderate verbal cues, Minimal tactile cues  Bed Mobility Comments 2: HOB elevated 50 degrees, use of rail  Bed Mobility 3  Bed Mobility 3: Scooting (sitting)  Level of Assistance 3: Minimal verbal cues, Contact guard  Bed Mobility 4  Bed Mobility 4: Scooting (supine)  Level of Assistance 4: Dependent, +2    Ambulation/Gait Training  Ambulation/Gait Training Performed: Yes  Ambulation/Gait Training 1  Surface 1: Level tile  Device 1: Rolling walker  Gait Support Devices:  (Left off shelf AFO with sock/tape)  Assistance 1: Minimum assistance, Minimal verbal cues, Minimal tactile cues  Quality of Gait 1:  (flexed upper trunk, slow, small steps)  Comments/Distance (ft) 1: 8 (too uncomfortable with off shelf AFO and no shoe (sock/tape on) so removed it))  Ambulation/Gait Training 2  Surface 2: Level tile  Device 2: Rolling walker  Assistance 2: Minimum assistance, Minimal verbal cues, Minimal tactile cues  Quality of Gait 2:  (flexed upper trunk/hips, rounded shoulders, small steps, slow, Left foot drag, dyspnea)  Comments/Distance (ft) 2: 65' x 2 (sitting rest pre/post)  Transfers  Transfer: Yes  Transfer 1  Transfer From 1: Bed to, Stand to  Transfer to 1: Bed, Stand  Technique 1: Sit to stand, Stand to sit  Transfer Device 1: Walker  Transfer Level of Assistance 1: Moderate assistance, Moderate verbal cues, Minimal tactile cues  Trials/Comments 1: cues for hand placement  Transfers 2  Transfer From 2: Stand to, Toilet to  Transfer to 2: Stand, Toilet  Technique 2: Stand to sit, Sit to stand, Stand pivot  Transfer Device 2: Walker (grab bar)  Transfer Level of Assistance 2: Moderate assistance, Moderate verbal cues, Minimal tactile cues    Stairs  Stairs: Yes  (up/down 4 steps with 1 rail and mod HHA, 1 step at time (leading with Right UE) slowly, sitting rest break at top, dyspneic)    Other Activity  Other Activity Performed: Yes  Other Activity 1: donned off shelf Left AFO with sock/tap over it and walked short distance (8') as pt had no shoe, AFO was slipping/not working and she was uncomfortable so removed it for rest of session    Outcome Measures:  [facility] Basic Mobility  Turning from your back to your side while in a flat bed without using bedrails: A little  Moving from lying on your back to sitting on the side of a flat bed without using bedrails: A lot  Moving to and from bed to chair (including a wheelchair): A little  Standing up from a chair using your arms (e.g. wheelchair or bedside chair): A lot  To walk in hospital room: A little  Climbing 3-5 steps with railing: A lot  Basic Mobility - Total Score: 15    Education Documentation  Precautions, taught by Dasha Thompson PT at 9/23/2024  5:23 PM.  Learner: Family, Patient  Readiness: Acceptance  Method: Explanation, Demonstration  Response: Needs Reinforcement, Demonstrated Understanding, Verbalizes Understanding  Comment: PT purpose/POC, need for sock/shoe for Left AFO, safe gait with WW, stairclimbing with WW, DC recs for rehab and rationale    Body Mechanics, taught by Dasha Thompson PT at 9/23/2024  5:23 PM.  Learner: Family, Patient  Readiness: Acceptance  Method: Explanation, Demonstration  Response: Needs Reinforcement, Demonstrated Understanding, Verbalizes Understanding  Comment: PT purpose/POC, need for sock/shoe for Left AFO, safe gait with WW, stairclimbing with WW, DC recs for rehab and rationale    Mobility Training, taught by Dasha Thompson PT at 9/23/2024  5:23 PM.  Learner: Family, Patient  Readiness: Acceptance  Method: Explanation, Demonstration  Response: Needs Reinforcement, Demonstrated Understanding, Verbalizes Understanding  Comment: PT purpose/POC, need for sock/shoe for Left  AFO, safe gait with WW, stairclimbing with WW, DC recs for rehab and rationale    Education Comments  No comments found.          Encounter Problems       Encounter Problems (Active)       Mobility       Pt will ascend/descend single flight of stairs with steve railing mod I (Progressing)       Start:  09/22/24    Expected End:  10/06/24            Pt will amb 150ft with LRAD mod I (Progressing)       Start:  09/22/24    Expected End:  10/06/24               PT Transfers       Pt will perform supine<>sit mod I (Progressing)       Start:  09/22/24    Expected End:  10/06/24            Pt will perform sit<>stand with LRAD mod I (Progressing)       Start:  09/22/24    Expected End:  10/06/24

## 2024-09-23 NOTE — PROGRESS NOTES
"Janelle Pitt is a 69 y.o. female on day 3 of admission presenting with Left foot drop.    Subjective   Drain was auto-dc'd otherwise No events overnight    Objective     Physical Exam  AOx3  RUE D5 B5 T5 HG/IO 5  RLE HF5 KE5 DF/PF 5  LUE D5 B5 T5 HG/IO 5  LLE HF5 KE5 DF 3 PF 5  Incision c/d/I    Last Recorded Vitals  Blood pressure 120/82, pulse 98, temperature 35.9 °C (96.6 °F), temperature source Temporal, resp. rate 16, height 1.549 m (5' 1\"), weight 67.6 kg (149 lb), SpO2 92%.  Intake/Output last 3 Shifts:  I/O last 3 completed shifts:  In: 4476.3 (66.2 mL/kg) [P.O.:240; Blood:3636.3; IV Piggyback:600]  Out: 1850 (27.4 mL/kg) [Urine:1700 (0.7 mL/kg/hr)]  Weight: 67.6 kg     Relevant Results  Lab Results   Component Value Date    WBC 11.2 09/22/2024    HGB 9.8 (L) 09/22/2024    HCT 30.9 (L) 09/22/2024    MCV 97 09/22/2024     (L) 09/22/2024     Lab Results   Component Value Date    GLUCOSE 90 09/22/2024    CALCIUM 8.2 (L) 09/22/2024     09/22/2024    K 3.8 09/22/2024    CO2 25 09/22/2024     09/22/2024    BUN 5 (L) 09/22/2024    CREATININE 0.45 (L) 09/22/2024     Assessment/Plan   Principal Problem:    Left foot drop  Active Problems:    Hyperlipidemia    Chronic low back pain    Anxiety    Depression    Chronic pain syndrome    Lumbar disc disease    HTN (hypertension)    Type 2 diabetes mellitus, without long-term current use of insulin (Multi)    Primary osteoarthritis of left hip    Lumbar foraminal stenosis    Chronic lumbar radiculopathy    Scoliosis of thoracolumbar region due to degenerative disease of spine in adult    69yF h/o diverticulosis, HLD, osteoporosis, PAD, p/w back pain and LLE radiculopathy with foot drop, 9/20 s/p L1-pelvic fusion, L3/4, L4/5 PCO, L5/S1 TLIF, 9/21 hgb 6.4 s/p 2u pRBC, 9/22 s/p hypotensive, desat at 90%, CXR RLL opacification, ekg normal sinus, occasional PVCs    9/22 drain auto dc'd     PLAN  Floor  Con't using AFO  Wean O2 as tolerated  PTOT- rehab  SCD, " LVX      Link Pimentel MD

## 2024-09-23 NOTE — CARE PLAN
The patient's goals for the shift include  free from discomforts    The clinical goals for the shift include Patient will be free from innjury and pain controlled with current  regimen .

## 2024-09-24 ENCOUNTER — APPOINTMENT (OUTPATIENT)
Dept: RADIOLOGY | Facility: HOSPITAL | Age: 69
DRG: 454 | End: 2024-09-24
Payer: MEDICARE

## 2024-09-24 LAB
ANION GAP SERPL CALC-SCNC: 13 MMOL/L (ref 10–20)
BUN SERPL-MCNC: 6 MG/DL (ref 6–23)
CALCIUM SERPL-MCNC: 7.9 MG/DL (ref 8.6–10.6)
CHLORIDE SERPL-SCNC: 100 MMOL/L (ref 98–107)
CO2 SERPL-SCNC: 26 MMOL/L (ref 21–32)
CREAT SERPL-MCNC: 0.39 MG/DL (ref 0.5–1.05)
EGFRCR SERPLBLD CKD-EPI 2021: >90 ML/MIN/1.73M*2
ERYTHROCYTE [DISTWIDTH] IN BLOOD BY AUTOMATED COUNT: 16.2 % (ref 11.5–14.5)
GLUCOSE SERPL-MCNC: 89 MG/DL (ref 74–99)
HCT VFR BLD AUTO: 27.3 % (ref 36–46)
HGB BLD-MCNC: 8.7 G/DL (ref 12–16)
MCH RBC QN AUTO: 30.6 PG (ref 26–34)
MCHC RBC AUTO-ENTMCNC: 31.9 G/DL (ref 32–36)
MCV RBC AUTO: 96 FL (ref 80–100)
NRBC BLD-RTO: 0 /100 WBCS (ref 0–0)
PLATELET # BLD AUTO: 207 X10*3/UL (ref 150–450)
POTASSIUM SERPL-SCNC: 3.4 MMOL/L (ref 3.5–5.3)
RBC # BLD AUTO: 2.84 X10*6/UL (ref 4–5.2)
SODIUM SERPL-SCNC: 136 MMOL/L (ref 136–145)
WBC # BLD AUTO: 8.4 X10*3/UL (ref 4.4–11.3)

## 2024-09-24 PROCEDURE — 82374 ASSAY BLOOD CARBON DIOXIDE: CPT | Performed by: PHYSICIAN ASSISTANT

## 2024-09-24 PROCEDURE — 2500000001 HC RX 250 WO HCPCS SELF ADMINISTERED DRUGS (ALT 637 FOR MEDICARE OP): Performed by: NEUROLOGICAL SURGERY

## 2024-09-24 PROCEDURE — 1200000002 HC GENERAL ROOM WITH TELEMETRY DAILY

## 2024-09-24 PROCEDURE — 36415 COLL VENOUS BLD VENIPUNCTURE: CPT | Performed by: PHYSICIAN ASSISTANT

## 2024-09-24 PROCEDURE — 71045 X-RAY EXAM CHEST 1 VIEW: CPT | Performed by: RADIOLOGY

## 2024-09-24 PROCEDURE — 97530 THERAPEUTIC ACTIVITIES: CPT | Mod: GP

## 2024-09-24 PROCEDURE — 2500000001 HC RX 250 WO HCPCS SELF ADMINISTERED DRUGS (ALT 637 FOR MEDICARE OP)

## 2024-09-24 PROCEDURE — 2500000001 HC RX 250 WO HCPCS SELF ADMINISTERED DRUGS (ALT 637 FOR MEDICARE OP): Performed by: STUDENT IN AN ORGANIZED HEALTH CARE EDUCATION/TRAINING PROGRAM

## 2024-09-24 PROCEDURE — 2500000001 HC RX 250 WO HCPCS SELF ADMINISTERED DRUGS (ALT 637 FOR MEDICARE OP): Performed by: PHYSICIAN ASSISTANT

## 2024-09-24 PROCEDURE — 85027 COMPLETE CBC AUTOMATED: CPT | Performed by: PHYSICIAN ASSISTANT

## 2024-09-24 PROCEDURE — 2500000004 HC RX 250 GENERAL PHARMACY W/ HCPCS (ALT 636 FOR OP/ED): Performed by: PHYSICIAN ASSISTANT

## 2024-09-24 PROCEDURE — 71045 X-RAY EXAM CHEST 1 VIEW: CPT

## 2024-09-24 PROCEDURE — 2500000002 HC RX 250 W HCPCS SELF ADMINISTERED DRUGS (ALT 637 FOR MEDICARE OP, ALT 636 FOR OP/ED): Performed by: PHYSICIAN ASSISTANT

## 2024-09-24 PROCEDURE — 97116 GAIT TRAINING THERAPY: CPT | Mod: GP

## 2024-09-24 RX ORDER — BISACODYL 10 MG/1
10 SUPPOSITORY RECTAL DAILY PRN
Status: DISCONTINUED | OUTPATIENT
Start: 2024-09-24 | End: 2024-09-25 | Stop reason: HOSPADM

## 2024-09-24 RX ORDER — AMOXICILLIN 250 MG
1 CAPSULE ORAL NIGHTLY
Status: CANCELLED
Start: 2024-09-24

## 2024-09-24 RX ORDER — POLYETHYLENE GLYCOL 3350 17 G/17G
17 POWDER, FOR SOLUTION ORAL 3 TIMES DAILY
Status: CANCELLED
Start: 2024-09-24

## 2024-09-24 RX ORDER — POTASSIUM CHLORIDE 20 MEQ/1
20 TABLET, EXTENDED RELEASE ORAL ONCE
Status: COMPLETED | OUTPATIENT
Start: 2024-09-24 | End: 2024-09-24

## 2024-09-24 RX ORDER — OXYCODONE HYDROCHLORIDE 5 MG/1
5 TABLET ORAL EVERY 6 HOURS PRN
Status: CANCELLED
Start: 2024-09-24 | End: 2024-10-01

## 2024-09-24 RX ORDER — BISACODYL 5 MG
10 TABLET, DELAYED RELEASE (ENTERIC COATED) ORAL DAILY
Status: DISCONTINUED | OUTPATIENT
Start: 2024-09-24 | End: 2024-09-25 | Stop reason: HOSPADM

## 2024-09-24 RX ORDER — ENOXAPARIN SODIUM 100 MG/ML
40 INJECTION SUBCUTANEOUS EVERY 24 HOURS
Status: CANCELLED
Start: 2024-09-24

## 2024-09-24 RX ORDER — BISACODYL 5 MG
10 TABLET, DELAYED RELEASE (ENTERIC COATED) ORAL DAILY
Status: CANCELLED
Start: 2024-09-24

## 2024-09-24 RX ORDER — POLYETHYLENE GLYCOL 3350 17 G/17G
17 POWDER, FOR SOLUTION ORAL 3 TIMES DAILY
Status: DISCONTINUED | OUTPATIENT
Start: 2024-09-24 | End: 2024-09-25 | Stop reason: HOSPADM

## 2024-09-24 RX ADMIN — CYCLOBENZAPRINE 5 MG: 10 TABLET, FILM COATED ORAL at 15:35

## 2024-09-24 RX ADMIN — CYCLOBENZAPRINE 5 MG: 10 TABLET, FILM COATED ORAL at 20:58

## 2024-09-24 RX ADMIN — OXYCODONE HYDROCHLORIDE 10 MG: 10 TABLET ORAL at 18:51

## 2024-09-24 RX ADMIN — CYCLOBENZAPRINE 5 MG: 10 TABLET, FILM COATED ORAL at 08:03

## 2024-09-24 RX ADMIN — OXYCODONE HYDROCHLORIDE 5 MG: 5 TABLET ORAL at 04:32

## 2024-09-24 RX ADMIN — POTASSIUM CHLORIDE 20 MEQ: 1500 TABLET, EXTENDED RELEASE ORAL at 11:55

## 2024-09-24 RX ADMIN — BISACODYL 10 MG: 5 TABLET, COATED ORAL at 08:03

## 2024-09-24 RX ADMIN — ACETAMINOPHEN 650 MG: 325 TABLET ORAL at 06:42

## 2024-09-24 RX ADMIN — GABAPENTIN 600 MG: 300 CAPSULE ORAL at 20:58

## 2024-09-24 RX ADMIN — MELATONIN TAB 5 MG 5 MG: 5 TAB at 20:58

## 2024-09-24 RX ADMIN — SENNOSIDES AND DOCUSATE SODIUM 1 TABLET: 50; 8.6 TABLET ORAL at 20:59

## 2024-09-24 RX ADMIN — ACETAMINOPHEN 650 MG: 325 TABLET ORAL at 11:54

## 2024-09-24 RX ADMIN — POLYETHYLENE GLYCOL 3350 17 G: 17 POWDER, FOR SOLUTION ORAL at 08:03

## 2024-09-24 RX ADMIN — ACETAMINOPHEN 650 MG: 325 TABLET ORAL at 17:30

## 2024-09-24 ASSESSMENT — PAIN SCALES - GENERAL
PAINLEVEL_OUTOF10: 0 - NO PAIN
PAINLEVEL_OUTOF10: 6
PAINLEVEL_OUTOF10: 5 - MODERATE PAIN
PAINLEVEL_OUTOF10: 0 - NO PAIN
PAINLEVEL_OUTOF10: 7
PAINLEVEL_OUTOF10: 4
PAINLEVEL_OUTOF10: 0 - NO PAIN
PAINLEVEL_OUTOF10: 3

## 2024-09-24 ASSESSMENT — PAIN - FUNCTIONAL ASSESSMENT
PAIN_FUNCTIONAL_ASSESSMENT: 0-10

## 2024-09-24 ASSESSMENT — COGNITIVE AND FUNCTIONAL STATUS - GENERAL
MOBILITY SCORE: 17
MOVING TO AND FROM BED TO CHAIR: A LITTLE
STANDING UP FROM CHAIR USING ARMS: A LITTLE
PERSONAL GROOMING: A LITTLE
MOBILITY SCORE: 18
STANDING UP FROM CHAIR USING ARMS: A LITTLE
CLIMB 3 TO 5 STEPS WITH RAILING: A LOT
MOVING TO AND FROM BED TO CHAIR: A LITTLE
TURNING FROM BACK TO SIDE WHILE IN FLAT BAD: A LOT
HELP NEEDED FOR BATHING: A LITTLE
DRESSING REGULAR UPPER BODY CLOTHING: A LITTLE
TOILETING: A LITTLE
CLIMB 3 TO 5 STEPS WITH RAILING: A LITTLE
WALKING IN HOSPITAL ROOM: A LITTLE
DRESSING REGULAR LOWER BODY CLOTHING: A LITTLE
DAILY ACTIVITIY SCORE: 19
TURNING FROM BACK TO SIDE WHILE IN FLAT BAD: A LITTLE
WALKING IN HOSPITAL ROOM: A LITTLE
MOVING FROM LYING ON BACK TO SITTING ON SIDE OF FLAT BED WITH BEDRAILS: A LITTLE

## 2024-09-24 ASSESSMENT — PAIN DESCRIPTION - LOCATION
LOCATION: BACK
LOCATION: BACK

## 2024-09-24 NOTE — CARE PLAN
The patient's goals for the shift include have BM    The clinical goals for the shift include pt will have BM    Over the shift, the patient did not make progress toward the following goals. Barriers to progression include no BM. Recommendations to address these barriers include continue laxative. Fluids and mobility encouraged.

## 2024-09-24 NOTE — PROGRESS NOTES
"Janelle Pitt is a 69 y.o. female on day 4 of admission presenting with Left foot drop.    Subjective   No acute events overnight, patient lying in bed comfortably. No concerns this AM.     Objective     Physical Exam  AOx3  RUE D5 B5 T5 HG/IO 5  RLE HF5 KE5 DF/PF 5  LUE D5 B5 T5 HG/IO 5  LLE HF5 KE5 DF 3 PF 5  Incision c/d/I    Last Recorded Vitals  Blood pressure 107/61, pulse 83, temperature 36.4 °C (97.5 °F), resp. rate 18, height 1.549 m (5' 1\"), weight 67.6 kg (149 lb), SpO2 97%.  Intake/Output last 3 Shifts:  I/O last 3 completed shifts:  In: 5236.3 (77.5 mL/kg) [P.O.:1500; Blood:3636.3; IV Piggyback:100]  Out: 3800 (56.2 mL/kg) [Urine:3800 (1.6 mL/kg/hr)]  Weight: 67.6 kg     Relevant Results  Lab Results   Component Value Date    WBC 11.3 09/23/2024    HGB 9.6 (L) 09/23/2024    HCT 29.4 (L) 09/23/2024    MCV 92 09/23/2024     09/23/2024     Lab Results   Component Value Date    GLUCOSE 93 09/23/2024    CALCIUM 8.3 (L) 09/23/2024     09/23/2024    K 3.6 09/23/2024    CO2 25 09/23/2024     09/23/2024    BUN 4 (L) 09/23/2024    CREATININE 0.48 (L) 09/23/2024     Assessment/Plan   Principal Problem:    Left foot drop  Active Problems:    Hyperlipidemia    Chronic low back pain    Anxiety    Depression    Chronic pain syndrome    Lumbar disc disease    HTN (hypertension)    Type 2 diabetes mellitus, without long-term current use of insulin (Multi)    Primary osteoarthritis of left hip    Lumbar foraminal stenosis    Chronic lumbar radiculopathy    Scoliosis of thoracolumbar region due to degenerative disease of spine in adult    69yF h/o diverticulosis, HLD, osteoporosis, PAD, p/w back pain and LLE radiculopathy with foot drop, 9/20 s/p L1-pelvic fusion, L3/4, L4/5 PCO, L5/S1 TLIF, 9/21 hgb 6.4 s/p 2u pRBC, 9/22 s/p hypotensive, desat at 90%, CXR RLL opacification, ekg normal sinus, occasional PVCs    9/22 drain auto dc'd     PLAN  Floor  Con't using AFO  PTOT- rehab  SCD, LVX      Reynaldo " MD Zoie

## 2024-09-24 NOTE — CARE PLAN
The patient's goals for the shift include  adequate pain control.    The clinical goals for the shift include Patient will have stable neuro exam throughout shift.      Problem: Skin  Goal: Decreased wound size/increased tissue granulation at next dressing change  Outcome: Progressing  Goal: Participates in plan/prevention/treatment measures  Outcome: Progressing  Goal: Prevent/manage excess moisture  Outcome: Progressing  Goal: Prevent/minimize sheer/friction injuries  Outcome: Progressing  Goal: Promote/optimize nutrition  Outcome: Progressing  Goal: Promote skin healing  Outcome: Progressing     Problem: Pain  Goal: Takes deep breaths with improved pain control throughout the shift  Outcome: Progressing  Goal: Turns in bed with improved pain control throughout the shift  Outcome: Progressing

## 2024-09-24 NOTE — PROGRESS NOTES
Physical Therapy    Physical Therapy Treatment    Patient Name: Janelle Pitt  MRN: [id number]  Department: [place] [id number]  Room: [address] [place] [address]  Today's Date: 9/24/2024  Time Calculation  Start Time: 1006  Stop Time: 1033  Time Calculation (min): 27 min         Assessment/Plan   PT Assessment  End of Session Communication: Bedside nurse  Assessment Comment: Pt able to continue to make improvements in function today. Able to negotiate stairs and ambulate with FWW. Pt continues to be limited by LLE weakness. Pt remains a great candidate for High Intensity Rehab after DC.  End of Session Patient Position: Up in chair, Alarm on  PT Plan  Inpatient/Swing Bed or Outpatient: Inpatient  PT Plan  Treatment/Interventions: Bed mobility, Transfer training, Gait training, Stair training, Balance training, Neuromuscular re-education, Endurance training, Therapeutic exercise, Therapeutic activity, Home exercise program, Postural re-education  PT Plan: Ongoing PT  PT Frequency: Daily  PT Discharge Recommendations: High intensity level of continued care  Equipment Recommended upon Discharge: Other (comment) (TBD at rehab; likely new custom left AFO)  PT Recommended Transfer Status: Assist x1, Assistive device (WW, Left AFO, min/mod A)  PT - OK to Discharge: Yes (PT eval completed & DC recs made)      General Visit Information:   PT  Visit  PT Received On: 09/24/24  Response to Previous Treatment: Patient with no complaints from previous session.  General  Missed Visit: No  Missed Visit Reason:  (--)  Family/Caregiver Present: No  Prior to Session Communication: Bedside nurse  Patient Position Received: Bed, 3 rail up, Alarm on  Preferred Learning Style: verbal  General Comment: Pt pleasant and agreeable to PT session    Subjective   Precautions:  Precautions  Medical Precautions: Fall precautions, Spinal precautions (L foot drop)  Post-Surgical Precautions: Spinal precautions  Prosthesis/Orthosis Used:  (Pt not wearing AFO this session,  reporting it is very uncomfortable. Family to bring sock to accommodate today.)  Precautions Comment: Pt able to follow spinal px with cueing    Vital Signs (Past 2hrs)        Date/Time Vitals Session Patient Position Pulse Resp SpO2 BP MAP (mmHg)    09/24/24 0900 --  --  --  --  98 %  --  --                     Objective   Pain:  Pain Assessment  Pain Assessment: 0-10  0-10 (Numeric) Pain Score: 0 - No pain  Cognition:  Cognition  Overall Cognitive Status: Within Functional Limits  Orientation Level: Oriented X4    Postural Control:  Postural Control  Postural Control: Impaired (Forward flexed posture)  Static Sitting Balance  Static Sitting-Balance Support: Feet supported, Bilateral upper extremity supported  Static Sitting-Level of Assistance: Close supervision  Static Sitting-Comment/Number of Minutes: 8 mins  Static Standing Balance  Static Standing-Balance Support: Bilateral upper extremity supported  Static Standing-Level of Assistance: Contact guard  Static Standing-Comment/Number of Minutes: 15 mins  Extremity/Trunk Assessments:    RLE   RLE : Within Functional Limits  LLE   LLE : Exceptions to WFL  AROM LLE (degrees)  LLE AROM Comment: AAROM ankle grossly WFL  Strength LLE  L Ankle Dorsiflexion: 2+/5  L Ankle Plantar Flexion: 3/5  Activity Tolerance:  Activity Tolerance  Endurance: Tolerates 10 - 20 min exercise with multiple rests  Treatments:    Bed Mobility  Bed Mobility: Yes  Bed Mobility 1  Bed Mobility 1: Rolling left, Log roll  Level of Assistance 1: Minimum assistance, Minimal verbal cues  Bed Mobility Comments 1: HOB elevated  Bed Mobility 2  Bed Mobility  2: Supine to sitting, Log roll  Level of Assistance 2: Moderate assistance, Minimal verbal cues  Bed Mobility Comments 2: HOB elevated    Ambulation/Gait Training  Ambulation/Gait Training Performed: Yes  Ambulation/Gait Training 1  Surface 1: Level tile, Carpet  Device 1: Rolling walker  Assistance 1: Contact guard, Minimal verbal cues  Quality  of Gait 1: Wide base of support, Soft knee(s) (L soft knee toward end of ambulation)  Comments/Distance (ft) 1: 40ft, 50ft, standing rest break  Transfers  Transfer: Yes  Transfer 1  Transfer From 1: Bed to  Transfer to 1: Stand  Technique 1: Sit to stand  Transfer Device 1: Walker  Transfer Level of Assistance 1: Minimum assistance, Minimal verbal cues  Transfers 2  Transfer From 2: Stand to  Transfer to 2: Chair with arms  Technique 2: Stand to sit  Transfer Device 2: Walker  Transfer Level of Assistance 2: Contact guard, Minimal verbal cues    Stairs  Stairs: Yes  Stairs  Rails 1: Bilateral  Device 1: Railing  Assistance 1: Contact guard, Moderate verbal cues (Cues for sequencing)  Comment/Number of Steps 1: 4 steps    Outcome Measures:  [facility] Basic Mobility  Turning from your back to your side while in a flat bed without using bedrails: A little  Moving from lying on your back to sitting on the side of a flat bed without using bedrails: A lot  Moving to and from bed to chair (including a wheelchair): A little  Standing up from a chair using your arms (e.g. wheelchair or bedside chair): A little  To walk in hospital room: A little  Climbing 3-5 steps with railing: A little  Basic Mobility - Total Score: 17    Education Documentation  Precautions, taught by Jacqueline Servin PT at 9/24/2024 10:55 AM.  Learner: Patient  Readiness: Acceptance  Method: Explanation, Demonstration  Response: Verbalizes Understanding, Demonstrated Understanding    Body Mechanics, taught by Jacqueline Servin PT at 9/24/2024 10:55 AM.  Learner: Patient  Readiness: Acceptance  Method: Explanation, Demonstration  Response: Verbalizes Understanding, Demonstrated Understanding    Mobility Training, taught by Jacqueline Servin PT at 9/24/2024 10:55 AM.  Learner: Patient  Readiness: Acceptance  Method: Explanation, Demonstration  Response: Verbalizes Understanding, Demonstrated Understanding    Education Comments  No comments found.      Encounter  Problems       Encounter Problems (Active)       Mobility       Pt will ascend/descend single flight of stairs with steve railing mod I (Progressing)       Start:  09/22/24    Expected End:  10/06/24            Pt will amb 150ft with LRAD mod I (Progressing)       Start:  09/22/24    Expected End:  10/06/24               PT Transfers       Pt will perform supine<>sit mod I (Progressing)       Start:  09/22/24    Expected End:  10/06/24            Pt will perform sit<>stand with LRAD mod I (Progressing)       Start:  09/22/24    Expected End:  10/06/24

## 2024-09-25 ENCOUNTER — DOCUMENTATION (OUTPATIENT)
Dept: HOME HEALTH SERVICES | Facility: HOME HEALTH | Age: 69
End: 2024-09-25
Payer: MEDICARE

## 2024-09-25 ENCOUNTER — HOME HEALTH ADMISSION (OUTPATIENT)
Dept: HOME HEALTH SERVICES | Facility: HOME HEALTH | Age: 69
End: 2024-09-25
Payer: MEDICARE

## 2024-09-25 VITALS
RESPIRATION RATE: 18 BRPM | SYSTOLIC BLOOD PRESSURE: 99 MMHG | TEMPERATURE: 97 F | DIASTOLIC BLOOD PRESSURE: 61 MMHG | OXYGEN SATURATION: 97 % | BODY MASS INDEX: 28.13 KG/M2 | HEIGHT: 61 IN | WEIGHT: 149 LBS | HEART RATE: 96 BPM

## 2024-09-25 LAB
ANION GAP SERPL CALC-SCNC: 14 MMOL/L (ref 10–20)
BUN SERPL-MCNC: 5 MG/DL (ref 6–23)
CALCIUM SERPL-MCNC: 8.2 MG/DL (ref 8.6–10.6)
CHLORIDE SERPL-SCNC: 98 MMOL/L (ref 98–107)
CO2 SERPL-SCNC: 27 MMOL/L (ref 21–32)
CREAT SERPL-MCNC: 0.48 MG/DL (ref 0.5–1.05)
EGFRCR SERPLBLD CKD-EPI 2021: >90 ML/MIN/1.73M*2
ERYTHROCYTE [DISTWIDTH] IN BLOOD BY AUTOMATED COUNT: 15.7 % (ref 11.5–14.5)
GLUCOSE SERPL-MCNC: 119 MG/DL (ref 74–99)
HCT VFR BLD AUTO: 30.7 % (ref 36–46)
HGB BLD-MCNC: 10.1 G/DL (ref 12–16)
MCH RBC QN AUTO: 30.5 PG (ref 26–34)
MCHC RBC AUTO-ENTMCNC: 32.9 G/DL (ref 32–36)
MCV RBC AUTO: 93 FL (ref 80–100)
NRBC BLD-RTO: 0 /100 WBCS (ref 0–0)
PLATELET # BLD AUTO: 298 X10*3/UL (ref 150–450)
POTASSIUM SERPL-SCNC: 3.5 MMOL/L (ref 3.5–5.3)
RBC # BLD AUTO: 3.31 X10*6/UL (ref 4–5.2)
SODIUM SERPL-SCNC: 135 MMOL/L (ref 136–145)
WBC # BLD AUTO: 7.4 X10*3/UL (ref 4.4–11.3)

## 2024-09-25 PROCEDURE — 2500000004 HC RX 250 GENERAL PHARMACY W/ HCPCS (ALT 636 FOR OP/ED): Performed by: NEUROLOGICAL SURGERY

## 2024-09-25 PROCEDURE — 99239 HOSP IP/OBS DSCHRG MGMT >30: CPT | Performed by: NURSE PRACTITIONER

## 2024-09-25 PROCEDURE — 85027 COMPLETE CBC AUTOMATED: CPT | Performed by: PHYSICIAN ASSISTANT

## 2024-09-25 PROCEDURE — 97116 GAIT TRAINING THERAPY: CPT | Mod: GP,CQ

## 2024-09-25 PROCEDURE — 2500000001 HC RX 250 WO HCPCS SELF ADMINISTERED DRUGS (ALT 637 FOR MEDICARE OP): Performed by: NEUROLOGICAL SURGERY

## 2024-09-25 PROCEDURE — 82374 ASSAY BLOOD CARBON DIOXIDE: CPT | Performed by: PHYSICIAN ASSISTANT

## 2024-09-25 PROCEDURE — 97110 THERAPEUTIC EXERCISES: CPT | Mod: GP,CQ

## 2024-09-25 PROCEDURE — 36415 COLL VENOUS BLD VENIPUNCTURE: CPT | Performed by: PHYSICIAN ASSISTANT

## 2024-09-25 RX ORDER — OXYCODONE HYDROCHLORIDE 5 MG/1
5 TABLET ORAL EVERY 6 HOURS PRN
Qty: 28 TABLET | Refills: 0 | Status: SHIPPED | OUTPATIENT
Start: 2024-09-25 | End: 2024-10-02

## 2024-09-25 RX ORDER — AMOXICILLIN 250 MG
2 CAPSULE ORAL 2 TIMES DAILY
Qty: 28 TABLET | Refills: 0 | Status: SHIPPED | OUTPATIENT
Start: 2024-09-25 | End: 2024-10-02

## 2024-09-25 RX ORDER — ACETAMINOPHEN 325 MG/1
650 TABLET ORAL EVERY 6 HOURS PRN
Start: 2024-09-25

## 2024-09-25 RX ADMIN — ACETAMINOPHEN 650 MG: 325 TABLET ORAL at 12:09

## 2024-09-25 RX ADMIN — ACETAMINOPHEN 650 MG: 325 TABLET ORAL at 06:30

## 2024-09-25 RX ADMIN — CYCLOBENZAPRINE 5 MG: 10 TABLET, FILM COATED ORAL at 08:31

## 2024-09-25 RX ADMIN — ACETAMINOPHEN 650 MG: 325 TABLET ORAL at 00:11

## 2024-09-25 RX ADMIN — ENOXAPARIN SODIUM 40 MG: 40 INJECTION SUBCUTANEOUS at 00:11

## 2024-09-25 RX ADMIN — CYCLOBENZAPRINE 5 MG: 10 TABLET, FILM COATED ORAL at 14:11

## 2024-09-25 RX ADMIN — OXYCODONE HYDROCHLORIDE 10 MG: 10 TABLET ORAL at 04:34

## 2024-09-25 ASSESSMENT — COGNITIVE AND FUNCTIONAL STATUS - GENERAL
DAILY ACTIVITIY SCORE: 19
DRESSING REGULAR LOWER BODY CLOTHING: A LITTLE
TOILETING: A LITTLE
MOBILITY SCORE: 18
CLIMB 3 TO 5 STEPS WITH RAILING: A LITTLE
DAILY ACTIVITIY SCORE: 19
TOILETING: A LITTLE
DRESSING REGULAR UPPER BODY CLOTHING: A LITTLE
CLIMB 3 TO 5 STEPS WITH RAILING: A LOT
DRESSING REGULAR LOWER BODY CLOTHING: A LITTLE
PERSONAL GROOMING: A LITTLE
MOVING TO AND FROM BED TO CHAIR: A LITTLE
MOVING FROM LYING ON BACK TO SITTING ON SIDE OF FLAT BED WITH BEDRAILS: A LITTLE
DRESSING REGULAR UPPER BODY CLOTHING: A LITTLE
HELP NEEDED FOR BATHING: A LITTLE
MOBILITY SCORE: 18
MOVING TO AND FROM BED TO CHAIR: A LITTLE
PERSONAL GROOMING: A LITTLE
TURNING FROM BACK TO SIDE WHILE IN FLAT BAD: A LITTLE
STANDING UP FROM CHAIR USING ARMS: A LITTLE
TURNING FROM BACK TO SIDE WHILE IN FLAT BAD: A LITTLE
WALKING IN HOSPITAL ROOM: A LITTLE
STANDING UP FROM CHAIR USING ARMS: A LITTLE
HELP NEEDED FOR BATHING: A LITTLE
TURNING FROM BACK TO SIDE WHILE IN FLAT BAD: A LITTLE
WALKING IN HOSPITAL ROOM: A LITTLE
MOVING TO AND FROM BED TO CHAIR: A LITTLE
WALKING IN HOSPITAL ROOM: A LITTLE
CLIMB 3 TO 5 STEPS WITH RAILING: A LOT

## 2024-09-25 ASSESSMENT — PAIN SCALES - GENERAL
PAINLEVEL_OUTOF10: 5 - MODERATE PAIN
PAINLEVEL_OUTOF10: 5 - MODERATE PAIN
PAINLEVEL_OUTOF10: 7
PAINLEVEL_OUTOF10: 0 - NO PAIN

## 2024-09-25 ASSESSMENT — PAIN - FUNCTIONAL ASSESSMENT: PAIN_FUNCTIONAL_ASSESSMENT: 0-10

## 2024-09-25 ASSESSMENT — PAIN DESCRIPTION - LOCATION: LOCATION: BACK

## 2024-09-25 NOTE — DISCHARGE SUMMARY
Discharge Diagnosis  Left foot drop    Issues Requiring Follow-Up  none    Test Results Pending At Discharge  Pending Labs       No current pending labs.            Hospital Course  Janelle Pitt is a 69 y.o. female with a past medical history of diverticulosis, HLD, osteoporosis and PAD who presented with back pain and LLE radiculopathy with foot drop.   Patient taken to the OR on 9/20 for L1-pelvic fusion, L3/4, L4/5 PCO, L5/S1 TLIF. She was admitted to the neurosurgery service post operatively.     9/21 Hbg drop to 6.4 s/p 2unit PRBC.   9/22 Hypotensive episode with desat to 90%, CXR with RLL opacification, EKG NSR with occasional PVCs. Surgical drain auto-dc'd.   9/23 Patient weaned off O2 with no further hypotension.     PT/OT evaluated patient and recommended placement at acute rehab at time of discharge; patient requesting to go home with Home Care.     Patient discharged with detailed instructions and scheduled outpatient follow up.     Pertinent Physical Exam At Time of Discharge  Physical Exam  General: in bed, awake, no distress  HEENT: normocephalic; KRISTIN; tongue midline  Cardiac: S1 & S2 regular  Resp: equal chest expansion, lungs clear bilaterally  Abd: BS+ x4, soft, non-tender  Extr: no edema, pulses palpable x4  Neuro: A&Ox3, follows commands, RASHID's; BUE's 5/5; RLE HF5 KE5 DF/PF5; LLE HF5 KE5 DF3 PF5; left foot drop  Skin: back incision clean, dry, intact with surgical glue  Psyche: calm, cooperative    Home Medications     Medication List      START taking these medications     acetaminophen 325 mg tablet; Commonly known as: Tylenol; Take 2 tablets   (650 mg) by mouth every 6 hours if needed for mild pain (1 - 3).   oxyCODONE 5 mg immediate release tablet; Commonly known as: Roxicodone;   Take 1 tablet (5 mg) by mouth every 6 hours if needed for moderate pain (4   - 6) or severe pain (7 - 10) for up to 7 days.   sennosides-docusate sodium 8.6-50 mg tablet; Commonly known as:   Nicolle-Colace; Take 2  tablets by mouth 2 times a day for 7 days. Take while   taking pain medication oxycodone to prevent constipation     CONTINUE taking these medications     cyclobenzaprine 5 mg tablet; Commonly known as: Flexeril; TAKE 1 TABLETS   AT BEDTIME AS NEEDED FOR MUSCLE PAIN. DO NOT DRIVE OR OPERATE HEAVY   MACHINERY WHILE TAKING   gabapentin 300 mg capsule; Commonly known as: Neurontin; Take 2 capsules   (600 mg) by mouth once daily at bedtime.     STOP taking these medications     chlorhexidine 0.12 % solution; Commonly known as: Peridex   chlorhexidine 4 % external liquid; Commonly known as: Hibiclens     I spent a total of 35 minutes with the patient and family and greater than 50% was spent in counseling and coordination of care.   Outpatient Follow-Up  Future Appointments   Date Time Provider Department Center   10/9/2024 10:00 AM NEUROSURGERY [facility] NURSE [id number] [facility]   10/29/2024 11:20 AM Jeff Barajas MD [id number] [facility]   1/24/2025 12:15 PM Kar Medina MD [id number] [facility]       Iza Watson, APRN-CNP

## 2024-09-25 NOTE — HH CARE COORDINATION
Home Care received a Referral for Nursing, Physical Therapy, and Occupational Therapy. We have processed the referral for a Start of Care on 9/27/24.     If you have any questions or concerns, please feel free to contact us at [phone]. Follow the prompts, enter your five digit zip code, and you will be directed to your care team on E[facility] 2.

## 2024-09-25 NOTE — PROGRESS NOTES
09/25/24 0808   Discharge Planning   Expected Discharge Disposition Rehab     [facility] does not have a bed until next Monday. Pt had no other preference for [facility]. Will offer SNF list.

## 2024-09-25 NOTE — PROGRESS NOTES
"Janelle Pitt is a 69 y.o. female on day 5 of admission presenting with Left foot drop.    Subjective   No acute events overnight, patient feels ready to leave and would like to go home and not rehab. No active concerns this AM. On room air.     Objective     Physical Exam  AOx3  RUE D5 B5 T5 HG/IO 5  RLE HF5 KE5 DF/PF 5  LUE D5 B5 T5 HG/IO 5  LLE HF5 KE5 DF 3 PF 5  Incision c/d/I    Last Recorded Vitals  Blood pressure 118/75, pulse 82, temperature 35.9 °C (96.6 °F), resp. rate 18, height 1.549 m (5' 1\"), weight 67.6 kg (149 lb), SpO2 96%.  Intake/Output last 3 Shifts:  I/O last 3 completed shifts:  In: 780 (11.5 mL/kg) [P.O.:780]  Out: 1641 (24.3 mL/kg) [Urine:1640 (0.7 mL/kg/hr); Stool:1]  Weight: 67.6 kg     Relevant Results  Lab Results   Component Value Date    WBC 8.4 09/24/2024    HGB 8.7 (L) 09/24/2024    HCT 27.3 (L) 09/24/2024    MCV 96 09/24/2024     09/24/2024     Lab Results   Component Value Date    GLUCOSE 89 09/24/2024    CALCIUM 7.9 (L) 09/24/2024     09/24/2024    K 3.4 (L) 09/24/2024    CO2 26 09/24/2024     09/24/2024    BUN 6 09/24/2024    CREATININE 0.39 (L) 09/24/2024     Assessment/Plan   Principal Problem:    Left foot drop  Active Problems:    Hyperlipidemia    Chronic low back pain    Anxiety    Depression    Chronic pain syndrome    Lumbar disc disease    HTN (hypertension)    Type 2 diabetes mellitus, without long-term current use of insulin (Multi)    Primary osteoarthritis of left hip    Lumbar foraminal stenosis    Chronic lumbar radiculopathy    Scoliosis of thoracolumbar region due to degenerative disease of spine in adult    69yF h/o diverticulosis, HLD, osteoporosis, PAD, p/w back pain and LLE radiculopathy with foot drop, 9/20 s/p L1-pelvic fusion, L3/4, L4/5 PCO, L5/S1 TLIF, 9/21 hgb 6.4 s/p 2u pRBC, 9/22 s/p hypotensive, desat at 90%, CXR RLL opacification, ekg normal sinus, occasional PVCs    9/22 drain auto dc'd   9/24 CXR b/l atelectasis "       PLAN  Floor  Con't using AFO  PTOT- rehab  SCD, LVX    Medically ready for dispo      Reynaldo Lino MD

## 2024-09-25 NOTE — PROGRESS NOTES
Physical Therapy    Physical Therapy Treatment    Patient Name: Janelle Pitt  MRN: [id number]  Department: [place] [id number]  Room: [address] [place] [address]  Today's Date: 9/25/2024  Time Calculation  Start Time: 1245  Stop Time: 1310  Time Calculation (min): 25 min         Assessment/Plan   PT Assessment  PT Assessment Results: Decreased strength, Decreased endurance, Impaired balance, Decreased mobility  End of Session Patient Position: Bed, 3 rail up, Alarm off, not on at start of session  PT Plan  Inpatient/Swing Bed or Outpatient: Inpatient  PT Plan  Treatment/Interventions: Bed mobility, Transfer training, Gait training, Stair training, Balance training, Neuromuscular re-education, Endurance training, Therapeutic exercise, Therapeutic activity, Home exercise program, Postural re-education  PT Plan: Ongoing PT  PT Frequency: Daily  PT Discharge Recommendations: High intensity level of continued care  Equipment Recommended upon Discharge: Other (comment) (TBD at rehab; likely new custom left AFO)  PT Recommended Transfer Status: Assist x1, Assistive device (WW, Left AFO, min/mod A)  PT - OK to Discharge: Yes (PT eval completed & DC recs made)      General Visit Information:   PT  Visit  PT Received On: 09/25/24  General  Family/Caregiver Present: No  Prior to Session Communication: Bedside nurse  Patient Position Received: Bed, 3 rail up, Alarm off, not on at start of session  General Comment: Pt supine in bed upon arrival. Pt pleasant and agreeable to therapy.    Subjective   Precautions:  Precautions  Medical Precautions: Fall precautions, Spinal precautions (L foot drop)  Post-Surgical Precautions: Spinal precautions  Precautions Comment: Pt able to follow spinal px with cueing    Vital Signs (Past 2hrs)        Date/Time Vitals Session Patient Position Pulse Resp SpO2 BP MAP (mmHg)    09/25/24 1151 --  --  --  18  97 %  99/61  74     09/25/24 1245 During PT  --  96  --  97 %  --  --                         Objective   Pain:      Cognition:  Cognition  Overall Cognitive Status: Within Functional Limits  Coordination:       Activity Tolerance:  Activity Tolerance  Endurance: Tolerates 10 - 20 min exercise with multiple rests  Treatments:  Therapeutic Exercise  Therapeutic Exercise Performed: Yes  Therapeutic Exercise Activity 1: Seated B LE AROM LAQ, hip flexion, heel raises 2x15    Bed Mobility  Bed Mobility: Yes  Bed Mobility 1  Bed Mobility 1: Supine to sitting  Level of Assistance 1: Close supervision  Bed Mobility Comments 1: HOB elevated, increased time to complete.  Bed Mobility 2  Bed Mobility  2: Sitting to supine  Level of Assistance 2: Close supervision  Bed Mobility Comments 2: Cues for log roll.    Ambulation/Gait Training  Ambulation/Gait Training Performed: Yes  Ambulation/Gait Training 1  Surface 1: Level tile  Device 1: Rolling walker  Assistance 1: Close supervision  Quality of Gait 1: Soft knee(s)  Comments/Distance (ft) 1: 150 x2 with seated rest between trials.  Transfers  Transfer: Yes  Transfer 1  Transfer From 1: Sit to, Stand to  Transfer to 1: Stand, Sit  Technique 1: Sit to stand, Stand to sit  Transfer Device 1: Walker  Transfer Level of Assistance 1: Minimum assistance, Close supervision  Trials/Comments 1: Pt initially Min A but progressed to SBA.  Transfers 2  Transfer From 2: Sit to, Toilet to  Transfer to 2: Toilet, Stand  Technique 2: Sit to stand, Stand to sit  Transfer Device 2: Walker  Transfer Level of Assistance 2: Close supervision    Stairs  Stairs: Yes  Stairs  Rails 1: Bilateral  Curb Step 1: No  Device 1: Railing  Assistance 1: Close supervision  Comment/Number of Steps 1: 4 with cues for proper B LE sequencing.    Outcome Measures:  [facility] Basic Mobility  Turning from your back to your side while in a flat bed without using bedrails: A little  Moving from lying on your back to sitting on the side of a flat bed without using bedrails: A little  Moving to and from bed to chair (including a  wheelchair): A little  Standing up from a chair using your arms (e.g. wheelchair or bedside chair): A little  To walk in hospital room: A little  Climbing 3-5 steps with railing: A little  Basic Mobility - Total Score: 18    Education Documentation  Precautions, taught by Trinidad Armstrong PTA at 9/25/2024  1:21 PM.  Learner: Patient  Readiness: Acceptance  Method: Explanation  Response: Verbalizes Understanding    Body Mechanics, taught by Trinidad Armstrong PTA at 9/25/2024  1:21 PM.  Learner: Patient  Readiness: Acceptance  Method: Explanation  Response: Verbalizes Understanding    Mobility Training, taught by Trinidad Armstrong PTA at 9/25/2024  1:21 PM.  Learner: Patient  Readiness: Acceptance  Method: Explanation  Response: Verbalizes Understanding    Education Comments  No comments found.        OP EDUCATION:       Encounter Problems       Encounter Problems (Active)       Mobility       Pt will ascend/descend single flight of stairs with steve railing mod I (Progressing)       Start:  09/22/24    Expected End:  10/06/24            Pt will amb 150ft with LRAD mod I (Progressing)       Start:  09/22/24    Expected End:  10/06/24               PT Transfers       Pt will perform supine<>sit mod I (Progressing)       Start:  09/22/24    Expected End:  10/06/24            Pt will perform sit<>stand with LRAD mod I (Progressing)       Start:  09/22/24    Expected End:  10/06/24

## 2024-09-25 NOTE — PROGRESS NOTES
09/25/24 1117   Discharge Planning   Expected Discharge Disposition Home H  ([facility])     [facility] is not able to accept due to insurance not contracted. Nsug NP and patient notified. Pt prefers to discharge home with  Home Care.   Add 1415  Home Care was informed of patient's plan to discharge home today. Pt has her own walker.   Add 1600  Home Care confirmed start of care on 9/27

## 2024-09-26 DIAGNOSIS — M48.061 LUMBAR FORAMINAL STENOSIS: ICD-10-CM

## 2024-09-26 DIAGNOSIS — M41.55 SCOLIOSIS OF THORACOLUMBAR REGION DUE TO DEGENERATIVE DISEASE OF SPINE IN ADULT: ICD-10-CM

## 2024-09-26 DIAGNOSIS — M54.32 SCIATICA OF LEFT SIDE: ICD-10-CM

## 2024-09-26 DIAGNOSIS — M25.552 LEFT HIP PAIN: ICD-10-CM

## 2024-09-26 RX ORDER — CYCLOBENZAPRINE HCL 5 MG
TABLET ORAL
Qty: 90 TABLET | Refills: 1 | Status: SHIPPED | OUTPATIENT
Start: 2024-09-26

## 2024-09-29 ENCOUNTER — HOME CARE VISIT (OUTPATIENT)
Dept: HOME HEALTH SERVICES | Facility: HOME HEALTH | Age: 69
End: 2024-09-29

## 2024-10-03 DIAGNOSIS — E11.9 TYPE 2 DIABETES MELLITUS WITHOUT COMPLICATION, WITHOUT LONG-TERM CURRENT USE OF INSULIN (MULTI): Primary | ICD-10-CM

## 2024-10-09 ENCOUNTER — CLINICAL SUPPORT (OUTPATIENT)
Dept: NEUROSURGERY | Facility: HOSPITAL | Age: 69
End: 2024-10-09
Payer: MEDICARE

## 2024-10-09 NOTE — PROGRESS NOTES
I had the pleasure of seeing Janelle Pitt and her  for wound/incision check. The incision is free of redness, swelling, and discharge. She had absorbable sutures. We reviewed incision care and follow up appointment. Janelle Pitt agrees and has no further questions. They will see Dr. Barajas in a few weeks for follow up.

## 2024-10-29 ENCOUNTER — OFFICE VISIT (OUTPATIENT)
Dept: NEUROSURGERY | Facility: CLINIC | Age: 69
End: 2024-10-29
Payer: MEDICARE

## 2024-10-29 ENCOUNTER — HOSPITAL ENCOUNTER (OUTPATIENT)
Dept: RADIOLOGY | Facility: CLINIC | Age: 69
Discharge: HOME | End: 2024-10-29
Payer: MEDICARE

## 2024-10-29 VITALS
WEIGHT: 147 LBS | SYSTOLIC BLOOD PRESSURE: 114 MMHG | DIASTOLIC BLOOD PRESSURE: 79 MMHG | HEART RATE: 96 BPM | TEMPERATURE: 97.3 F | HEIGHT: 61 IN | BODY MASS INDEX: 27.75 KG/M2

## 2024-10-29 DIAGNOSIS — M48.061 LUMBAR FORAMINAL STENOSIS: ICD-10-CM

## 2024-10-29 DIAGNOSIS — M41.55 SCOLIOSIS OF THORACOLUMBAR REGION DUE TO DEGENERATIVE DISEASE OF SPINE IN ADULT: ICD-10-CM

## 2024-10-29 DIAGNOSIS — Z09 POSTOPERATIVE FOLLOW-UP: Primary | ICD-10-CM

## 2024-10-29 PROCEDURE — 3060F POS MICROALBUMINURIA REV: CPT | Performed by: NEUROLOGICAL SURGERY

## 2024-10-29 PROCEDURE — 1159F MED LIST DOCD IN RCRD: CPT | Performed by: NEUROLOGICAL SURGERY

## 2024-10-29 PROCEDURE — 3074F SYST BP LT 130 MM HG: CPT | Performed by: NEUROLOGICAL SURGERY

## 2024-10-29 PROCEDURE — 1036F TOBACCO NON-USER: CPT | Performed by: NEUROLOGICAL SURGERY

## 2024-10-29 PROCEDURE — 72082 X-RAY EXAM ENTIRE SPI 2/3 VW: CPT

## 2024-10-29 PROCEDURE — 3008F BODY MASS INDEX DOCD: CPT | Performed by: NEUROLOGICAL SURGERY

## 2024-10-29 PROCEDURE — 3049F LDL-C 100-129 MG/DL: CPT | Performed by: NEUROLOGICAL SURGERY

## 2024-10-29 PROCEDURE — 3078F DIAST BP <80 MM HG: CPT | Performed by: NEUROLOGICAL SURGERY

## 2024-10-29 PROCEDURE — 1126F AMNT PAIN NOTED NONE PRSNT: CPT | Performed by: NEUROLOGICAL SURGERY

## 2024-10-29 PROCEDURE — 99211 OFF/OP EST MAY X REQ PHY/QHP: CPT | Performed by: NEUROLOGICAL SURGERY

## 2024-10-29 PROCEDURE — 1157F ADVNC CARE PLAN IN RCRD: CPT | Performed by: NEUROLOGICAL SURGERY

## 2024-10-29 ASSESSMENT — PAIN SCALES - GENERAL: PAINLEVEL_OUTOF10: 0-NO PAIN

## 2024-10-29 ASSESSMENT — ENCOUNTER SYMPTOMS
LOSS OF SENSATION IN FEET: 0
OCCASIONAL FEELINGS OF UNSTEADINESS: 1

## 2025-01-24 ENCOUNTER — APPOINTMENT (OUTPATIENT)
Dept: PRIMARY CARE | Facility: CLINIC | Age: 70
End: 2025-01-24
Payer: MEDICARE

## 2025-01-24 VITALS
DIASTOLIC BLOOD PRESSURE: 67 MMHG | BODY MASS INDEX: 27 KG/M2 | WEIGHT: 143 LBS | HEIGHT: 61 IN | TEMPERATURE: 97.2 F | SYSTOLIC BLOOD PRESSURE: 132 MMHG

## 2025-01-24 DIAGNOSIS — M54.32 SCIATICA OF LEFT SIDE: ICD-10-CM

## 2025-01-24 DIAGNOSIS — Z00.00 MEDICARE ANNUAL WELLNESS VISIT, SUBSEQUENT: Primary | ICD-10-CM

## 2025-01-24 DIAGNOSIS — M54.16 CHRONIC LUMBAR RADICULOPATHY: ICD-10-CM

## 2025-01-24 DIAGNOSIS — M25.552 LEFT HIP PAIN: ICD-10-CM

## 2025-01-24 DIAGNOSIS — R73.9 BORDERLINE HYPERGLYCEMIA: ICD-10-CM

## 2025-01-24 DIAGNOSIS — M48.062 LUMBAR STENOSIS WITH NEUROGENIC CLAUDICATION: ICD-10-CM

## 2025-01-24 PROBLEM — E11.9 TYPE 2 DIABETES MELLITUS, WITHOUT LONG-TERM CURRENT USE OF INSULIN (MULTI): Status: RESOLVED | Noted: 2024-09-20 | Resolved: 2025-01-24

## 2025-01-24 LAB — POC HEMOGLOBIN A1C: 5.5 % (ref 4.2–6.5)

## 2025-01-24 PROCEDURE — 3075F SYST BP GE 130 - 139MM HG: CPT | Performed by: FAMILY MEDICINE

## 2025-01-24 PROCEDURE — 3078F DIAST BP <80 MM HG: CPT | Performed by: FAMILY MEDICINE

## 2025-01-24 PROCEDURE — 1160F RVW MEDS BY RX/DR IN RCRD: CPT | Performed by: FAMILY MEDICINE

## 2025-01-24 PROCEDURE — 3008F BODY MASS INDEX DOCD: CPT | Performed by: FAMILY MEDICINE

## 2025-01-24 PROCEDURE — G0439 PPPS, SUBSEQ VISIT: HCPCS | Performed by: FAMILY MEDICINE

## 2025-01-24 PROCEDURE — 83036 HEMOGLOBIN GLYCOSYLATED A1C: CPT | Performed by: FAMILY MEDICINE

## 2025-01-24 PROCEDURE — 1157F ADVNC CARE PLAN IN RCRD: CPT | Performed by: FAMILY MEDICINE

## 2025-01-24 PROCEDURE — 1170F FXNL STATUS ASSESSED: CPT | Performed by: FAMILY MEDICINE

## 2025-01-24 PROCEDURE — 1159F MED LIST DOCD IN RCRD: CPT | Performed by: FAMILY MEDICINE

## 2025-01-24 RX ORDER — GABAPENTIN 300 MG/1
600 CAPSULE ORAL NIGHTLY
Qty: 180 CAPSULE | Refills: 3 | Status: SHIPPED | OUTPATIENT
Start: 2025-01-24

## 2025-01-24 RX ORDER — CYCLOBENZAPRINE HCL 5 MG
TABLET ORAL
Qty: 90 TABLET | Refills: 1 | Status: SHIPPED | OUTPATIENT
Start: 2025-01-24

## 2025-01-24 ASSESSMENT — ENCOUNTER SYMPTOMS
LOSS OF SENSATION IN FEET: 0
OCCASIONAL FEELINGS OF UNSTEADINESS: 0
DEPRESSION: 0

## 2025-01-24 ASSESSMENT — ACTIVITIES OF DAILY LIVING (ADL)
DRESSING: INDEPENDENT
TAKING_MEDICATION: INDEPENDENT
BATHING: INDEPENDENT
DOING_HOUSEWORK: INDEPENDENT
MANAGING_FINANCES: INDEPENDENT
GROCERY_SHOPPING: INDEPENDENT

## 2025-01-24 NOTE — PROGRESS NOTES
"Subjective   Reason for Visit: Janelle Pitt is an 69 y.o. female here for a Medicare Wellness visit.     Past Medical, Surgical, and Family History reviewed and updated in chart.    Reviewed all medications by prescribing practitioner or clinical pharmacist (such as prescriptions, OTCs, herbal therapies and supplements) and documented in the medical record.    HPI  Here for medicare annual visit  Patient undeerwent L1-5 laminectomy, foot drop improved, able to move her left foot better. But still having pain with numbness/tingling. Still taking flexeril and gabapentin, needing refills    Patient Care Team:  Kar Medina MD as PCP - General  Kar Medina MD as PCP - [employer] PCP     Review of Systems  General: no fever  Eyes: no blurry vision  ENT: no sore throat, no ear pain  Resp: no cough, sob or wheezing  Cardio: no chest pain, no palpitations  Abd: no nausea/vomiting  : no dysuria, no increased urinary frequency    Objective   Vitals:  /67   Temp 36.2 °C (97.2 °F)   Ht 1.549 m (5' 1\")   Wt 64.9 kg (143 lb)   BMI 27.02 kg/m²       Physical Exam  Gen: NAD, alert  Head: normocephalic/atraumatic  Eyes: conjunctivae normal  Ears: canals clear bilaterally, TM normal   Nose: external nose normal   Oropharynx: clear   Resp: Clear to auscultation  CVS: Regular rate and rhythm  Abdomen: soft, NT, ND  Ext: no edema, NT of lower extremities    Assessment & Plan  Medicare annual wellness visit, subsequent         Borderline hyperglycemia    Orders:    POCT glycosylated hemoglobin (Hb A1C) manually resulted    Chronic lumbar radiculopathy    Orders:    gabapentin (Neurontin) 300 mg capsule; Take 2 capsules (600 mg) by mouth once daily at bedtime.    Lumbar stenosis with neurogenic claudication    Orders:    gabapentin (Neurontin) 300 mg capsule; Take 2 capsules (600 mg) by mouth once daily at bedtime.    Left hip pain    Orders:    cyclobenzaprine (Flexeril) 5 mg tablet; " TAKE 1 TABLETS AT BEDTIME AS NEEDED FOR MUSCLE PAIN. DO NOT DRIVE OR OPERATE MACHINERY    Sciatica of left side    Orders:    cyclobenzaprine (Flexeril) 5 mg tablet; TAKE 1 TABLETS AT BEDTIME AS NEEDED FOR MUSCLE PAIN. DO NOT DRIVE OR OPERATE MACHINERY

## 2025-01-26 NOTE — ASSESSMENT & PLAN NOTE
Orders:    cyclobenzaprine (Flexeril) 5 mg tablet; TAKE 1 TABLETS AT BEDTIME AS NEEDED FOR MUSCLE PAIN. DO NOT DRIVE OR OPERATE MACHINERY

## 2025-01-26 NOTE — ASSESSMENT & PLAN NOTE
Orders:    gabapentin (Neurontin) 300 mg capsule; Take 2 capsules (600 mg) by mouth once daily at bedtime.

## 2025-04-25 DIAGNOSIS — M25.552 LEFT HIP PAIN: ICD-10-CM

## 2025-04-25 DIAGNOSIS — M54.32 SCIATICA OF LEFT SIDE: ICD-10-CM

## 2025-04-25 RX ORDER — CYCLOBENZAPRINE HCL 5 MG
TABLET ORAL
Qty: 90 TABLET | Refills: 1 | Status: SHIPPED | OUTPATIENT
Start: 2025-04-25

## 2025-07-24 ENCOUNTER — APPOINTMENT (OUTPATIENT)
Dept: PRIMARY CARE | Facility: CLINIC | Age: 70
End: 2025-07-24
Payer: MEDICARE

## 2025-07-24 VITALS
TEMPERATURE: 97.6 F | DIASTOLIC BLOOD PRESSURE: 69 MMHG | WEIGHT: 146 LBS | SYSTOLIC BLOOD PRESSURE: 130 MMHG | HEIGHT: 61 IN | BODY MASS INDEX: 27.56 KG/M2 | HEART RATE: 64 BPM | OXYGEN SATURATION: 97 %

## 2025-07-24 DIAGNOSIS — R25.2 MUSCLE CRAMPS: ICD-10-CM

## 2025-07-24 DIAGNOSIS — E78.2 MIXED HYPERLIPIDEMIA: Primary | ICD-10-CM

## 2025-07-24 DIAGNOSIS — M54.32 SCIATICA OF LEFT SIDE: ICD-10-CM

## 2025-07-24 DIAGNOSIS — M25.552 LEFT HIP PAIN: ICD-10-CM

## 2025-07-24 DIAGNOSIS — Z78.0 ASYMPTOMATIC POSTMENOPAUSAL STATE: ICD-10-CM

## 2025-07-24 DIAGNOSIS — Z12.31 ENCOUNTER FOR SCREENING MAMMOGRAM FOR MALIGNANT NEOPLASM OF BREAST: ICD-10-CM

## 2025-07-24 PROCEDURE — 3008F BODY MASS INDEX DOCD: CPT | Performed by: FAMILY MEDICINE

## 2025-07-24 PROCEDURE — 3078F DIAST BP <80 MM HG: CPT | Performed by: FAMILY MEDICINE

## 2025-07-24 PROCEDURE — 1159F MED LIST DOCD IN RCRD: CPT | Performed by: FAMILY MEDICINE

## 2025-07-24 PROCEDURE — 1036F TOBACCO NON-USER: CPT | Performed by: FAMILY MEDICINE

## 2025-07-24 PROCEDURE — 3075F SYST BP GE 130 - 139MM HG: CPT | Performed by: FAMILY MEDICINE

## 2025-07-24 PROCEDURE — 99214 OFFICE O/P EST MOD 30 MIN: CPT | Performed by: FAMILY MEDICINE

## 2025-07-24 RX ORDER — CYCLOBENZAPRINE HCL 5 MG
5 TABLET ORAL 3 TIMES DAILY PRN
Qty: 90 TABLET | Refills: 1 | Status: SHIPPED | OUTPATIENT
Start: 2025-07-24

## 2025-07-24 NOTE — PROGRESS NOTES
"Subjective   Patient ID: Janelle Pitt is a 69 y.o. female who presents for Follow-up (Discuss muscle relaxer ).  HPI  Here for follow up  Has been having leg cramps for the past few months. Been eating more bananas and trying to keep hydrated. More cramps when she is more active. Taking flexeril 5mg -10mg at night not helping. Has no issue with medication. Will increase every 8 hours.     Review of Systems  General: no fever  Eyes: no blurry vision  ENT: no sore throat, no ear pain  Resp: no cough, sob or wheezing  Cardio: no chest pain, no palpitations  Abd: no nausea/vomiting  : no dysuria, no increased urinary frequency      /69   Pulse 64   Temp 36.4 °C (97.6 °F)   Ht (!) 1.549 m (5' 1\")   Wt 66.2 kg (146 lb)   SpO2 97%   BMI 27.59 kg/m²       Objective   Physical Exam  Gen: NAD, alert  Head: normocephalic/atraumatic  Eyes: conjunctivae normal  Ears: canals clear bilaterally, TM normal   Nose: external nose normal   Oropharynx: clear   Resp: Clear to auscultation  CVS: Regular rate and rhythm  Abdomen: soft, NT, ND  Ext: no edema, NT of lower extremities  Neuro: using cane to ambulate    Personally reviewed the following labs:   Lab Results   Component Value Date    WBC 7.4 09/25/2024    HGB 10.1 (L) 09/25/2024    HCT 30.7 (L) 09/25/2024    MCV 93 09/25/2024     09/25/2024       Chemistry    Lab Results   Component Value Date/Time     (L) 09/25/2024 0931    K 3.5 09/25/2024 0931    CL 98 09/25/2024 0931    CO2 27 09/25/2024 0931    BUN 5 (L) 09/25/2024 0931    CREATININE 0.48 (L) 09/25/2024 0931    Lab Results   Component Value Date/Time    CALCIUM 8.2 (L) 09/25/2024 0931    ALKPHOS 70 08/09/2024 0945    AST 19 08/09/2024 0945    ALT 12 08/09/2024 0945    BILITOT 0.7 08/09/2024 0945        Lab Results   Component Value Date    CHOL 209 (H) 08/09/2024    CHOL 215 (H) 07/10/2023    CHOL 213 (H) 07/25/2022     Lab Results   Component Value Date    HDL 80.4 08/09/2024    HDL 92.1 " "07/10/2023    HDL 92.0 07/25/2022     Lab Results   Component Value Date    LDLCALC 106 (H) 08/09/2024     Lab Results   Component Value Date    TRIG 113 08/09/2024    TRIG 107 07/10/2023    TRIG 169 (H) 07/25/2022     No components found for: \"CHOLHDL\"    Assessment/Plan   Problem List Items Addressed This Visit       Hyperlipidemia - Primary    Relevant Orders    Lipid panel    CBC    Comprehensive Metabolic Panel    TSH with reflex to Free T4 if abnormal    CT cardiac scoring wo IV contrast    Left hip pain    Relevant Medications    cyclobenzaprine (Flexeril) 5 mg tablet    Sciatica of left side    Relevant Medications    cyclobenzaprine (Flexeril) 5 mg tablet     Other Visit Diagnoses         Encounter for screening mammogram for malignant neoplasm of breast        Relevant Orders    BI mammo bilateral screening tomosynthesis      Asymptomatic postmenopausal state        Relevant Orders    XR DEXA bone density      Muscle cramps        Relevant Medications    cyclobenzaprine (Flexeril) 5 mg tablet    Other Relevant Orders    Magnesium    Creatine Kinase               "

## 2025-08-01 DIAGNOSIS — E78.2 MIXED HYPERLIPIDEMIA: ICD-10-CM

## 2025-08-13 ENCOUNTER — RESULTS FOLLOW-UP (OUTPATIENT)
Dept: PRIMARY CARE | Facility: CLINIC | Age: 70
End: 2025-08-13
Payer: MEDICARE

## 2025-08-13 DIAGNOSIS — E78.2 MIXED HYPERLIPIDEMIA: Primary | ICD-10-CM

## 2025-08-13 LAB
ALBUMIN SERPL-MCNC: 4.4 G/DL (ref 3.6–5.1)
ALP SERPL-CCNC: 86 U/L (ref 37–153)
ALT SERPL-CCNC: 18 U/L (ref 6–29)
ANION GAP SERPL CALCULATED.4IONS-SCNC: 9 MMOL/L (CALC) (ref 7–17)
AST SERPL-CCNC: 22 U/L (ref 10–35)
BILIRUB SERPL-MCNC: 0.5 MG/DL (ref 0.2–1.2)
BUN SERPL-MCNC: 9 MG/DL (ref 7–25)
CALCIUM SERPL-MCNC: 9.5 MG/DL (ref 8.6–10.4)
CHLORIDE SERPL-SCNC: 103 MMOL/L (ref 98–110)
CHOLEST SERPL-MCNC: 268 MG/DL
CHOLEST/HDLC SERPL: 4.3 (CALC)
CK SERPL-CCNC: 81 U/L (ref 18–225)
CO2 SERPL-SCNC: 27 MMOL/L (ref 20–32)
CREAT SERPL-MCNC: 0.68 MG/DL (ref 0.6–1)
EGFRCR SERPLBLD CKD-EPI 2021: 94 ML/MIN/1.73M2
ERYTHROCYTE [DISTWIDTH] IN BLOOD BY AUTOMATED COUNT: 15 % (ref 11–15)
GLUCOSE SERPL-MCNC: 98 MG/DL (ref 65–99)
HCT VFR BLD AUTO: 43.7 % (ref 35–45)
HDLC SERPL-MCNC: 62 MG/DL
HGB BLD-MCNC: 13.6 G/DL (ref 11.7–15.5)
LDLC SERPL CALC-MCNC: 177 MG/DL (CALC)
MAGNESIUM SERPL-MCNC: 2.3 MG/DL (ref 1.5–2.5)
MCH RBC QN AUTO: 29.2 PG (ref 27–33)
MCHC RBC AUTO-ENTMCNC: 31.1 G/DL (ref 32–36)
MCV RBC AUTO: 93.8 FL (ref 80–100)
NONHDLC SERPL-MCNC: 206 MG/DL (CALC)
PLATELET # BLD AUTO: 374 THOUSAND/UL (ref 140–400)
PMV BLD REES-ECKER: 9.4 FL (ref 7.5–12.5)
POTASSIUM SERPL-SCNC: 4.2 MMOL/L (ref 3.5–5.3)
PROT SERPL-MCNC: 7.2 G/DL (ref 6.1–8.1)
RBC # BLD AUTO: 4.66 MILLION/UL (ref 3.8–5.1)
SODIUM SERPL-SCNC: 139 MMOL/L (ref 135–146)
TRIGL SERPL-MCNC: 143 MG/DL
TSH SERPL-ACNC: 1.08 MIU/L (ref 0.4–4.5)
WBC # BLD AUTO: 6.6 THOUSAND/UL (ref 3.8–10.8)

## 2025-08-19 RX ORDER — PRAVASTATIN SODIUM 10 MG/1
10 TABLET ORAL DAILY
Qty: 100 TABLET | Refills: 3 | Status: SHIPPED | OUTPATIENT
Start: 2025-08-19 | End: 2026-09-23

## 2025-09-02 ENCOUNTER — HOSPITAL ENCOUNTER (OUTPATIENT)
Dept: RADIOLOGY | Facility: HOSPITAL | Age: 70
Discharge: HOME | End: 2025-09-02
Payer: MEDICARE

## 2025-09-02 ENCOUNTER — APPOINTMENT (OUTPATIENT)
Dept: RADIOLOGY | Facility: HOSPITAL | Age: 70
End: 2025-09-02
Payer: MEDICARE

## 2025-09-02 VITALS — BODY MASS INDEX: 27.56 KG/M2 | HEIGHT: 61 IN | WEIGHT: 146 LBS

## 2025-09-02 DIAGNOSIS — Z78.0 ASYMPTOMATIC POSTMENOPAUSAL STATE: ICD-10-CM

## 2025-09-02 DIAGNOSIS — E78.2 MIXED HYPERLIPIDEMIA: ICD-10-CM

## 2025-09-02 DIAGNOSIS — Z12.31 ENCOUNTER FOR SCREENING MAMMOGRAM FOR MALIGNANT NEOPLASM OF BREAST: ICD-10-CM

## 2025-09-02 PROCEDURE — 75571 CT HRT W/O DYE W/CA TEST: CPT

## 2025-09-02 PROCEDURE — 77063 BREAST TOMOSYNTHESIS BI: CPT

## 2025-09-02 PROCEDURE — 77067 SCR MAMMO BI INCL CAD: CPT | Performed by: RADIOLOGY

## 2025-09-02 PROCEDURE — 77063 BREAST TOMOSYNTHESIS BI: CPT | Performed by: RADIOLOGY

## 2025-09-02 PROCEDURE — 77080 DXA BONE DENSITY AXIAL: CPT | Performed by: RADIOLOGY

## 2025-09-02 PROCEDURE — 77080 DXA BONE DENSITY AXIAL: CPT

## 2026-01-22 ENCOUNTER — APPOINTMENT (OUTPATIENT)
Dept: PRIMARY CARE | Facility: CLINIC | Age: 71
End: 2026-01-22
Payer: MEDICARE

## (undated) DEVICE — CAUTERY, PENCIL, PUSH BUTTON, SMOKE EVAC, 70MM

## (undated) DEVICE — SEALER, BIPOLAR, AQUA MANTYS 6.0

## (undated) DEVICE — Device

## (undated) DEVICE — SEALANT, HEMOSTATIC, FLOSEAL, 10 ML

## (undated) DEVICE — CLOSURE SYSTEM, DERMABOND, PRINEO, 22CM, STERILE

## (undated) DEVICE — BONE, MILL, MIDAS REX, DUAL BLADE, ELECTRIC

## (undated) DEVICE — PAD, GROUNDING, ELECTROSURGICAL, W/9 FT CABLE, POLYHESIVE II, ADULT, LF

## (undated) DEVICE — COVER, CART, 45 X 27 X 48 IN, CLEAR

## (undated) DEVICE — SUTURE, VICRYL, 4-0, 18 IN, UNDYED BR PS-2

## (undated) DEVICE — DRAPE, SHEET, FAN FOLDED, HALF, 44 X 58 IN, DISPOSABLE, LF, STERILE

## (undated) DEVICE — PATIENT SPECIFIC UNID TECHNOLOGY

## (undated) DEVICE — TAPE, SILK, DURAPORE, 3 IN X 10 YD, LF

## (undated) DEVICE — MANIFOLD, 4 PORT NEPTUNE STANDARD

## (undated) DEVICE — SPONGE, LAP, XRAY DECT, SC+RFID, 12X12, STERILE

## (undated) DEVICE — SPONGE, HEMOSTATIC, GELATIN, SURGIFOAM, 8 X 12.5 CM X 10 MM

## (undated) DEVICE — APPLICATOR, CHLORAPREP, W/ORANGE TINT, 26ML

## (undated) DEVICE — ELECTRODE, ELECTROSURGICAL, BLADE, INSULATED, ENT/IMA, STERILE

## (undated) DEVICE — WOUND SYSTEM, DEBRIDEMENT & CLEANING, O.R DUOPAK

## (undated) DEVICE — MARKER, SKIN, RULER AND LABEL PACK, CUSTOM

## (undated) DEVICE — SPONGE GAUZE, XRAY SC+RFID, 4X4 16 PLY, STERILE

## (undated) DEVICE — TIP,  ELECTRODE COATED INSULATED, EXTENDED, LF

## (undated) DEVICE — DRESSING, MEPILEX, POST OP, 8 X 4

## (undated) DEVICE — DRESSING, MEPILEX, BORDER FLEX, 6 X 8

## (undated) DEVICE — SUTURE, VICRYL, 0, 18 IN, UNDYED

## (undated) DEVICE — DRESSING, MEPILEX, BORDER FLEX, 3 X 3

## (undated) DEVICE — KIT, PATIENT CARE, JACKSON TABLE W/PRONE-SAFE HEADREST

## (undated) DEVICE — ELECTRODE, ELECTROSURGICAL, BLADE EXT 4 INCH, INSULATED

## (undated) DEVICE — TUBING, SMOKE EVAC, 3/8 X 10 FT